# Patient Record
Sex: FEMALE | Race: WHITE | NOT HISPANIC OR LATINO | URBAN - METROPOLITAN AREA
[De-identification: names, ages, dates, MRNs, and addresses within clinical notes are randomized per-mention and may not be internally consistent; named-entity substitution may affect disease eponyms.]

---

## 2017-03-02 ENCOUNTER — EMERGENCY (EMERGENCY)
Facility: HOSPITAL | Age: 82
LOS: 1 days | Discharge: HOME | End: 2017-03-02
Attending: EMERGENCY MEDICINE | Admitting: EMERGENCY MEDICINE
Payer: MEDICARE

## 2017-03-02 VITALS
DIASTOLIC BLOOD PRESSURE: 92 MMHG | HEART RATE: 80 BPM | OXYGEN SATURATION: 98 % | RESPIRATION RATE: 18 BRPM | SYSTOLIC BLOOD PRESSURE: 168 MMHG | TEMPERATURE: 98 F

## 2017-03-02 VITALS
TEMPERATURE: 98 F | DIASTOLIC BLOOD PRESSURE: 81 MMHG | OXYGEN SATURATION: 97 % | RESPIRATION RATE: 16 BRPM | HEART RATE: 81 BPM | SYSTOLIC BLOOD PRESSURE: 141 MMHG

## 2017-03-02 DIAGNOSIS — Z79.899 OTHER LONG TERM (CURRENT) DRUG THERAPY: ICD-10-CM

## 2017-03-02 DIAGNOSIS — R55 SYNCOPE AND COLLAPSE: ICD-10-CM

## 2017-03-02 DIAGNOSIS — E86.0 DEHYDRATION: ICD-10-CM

## 2017-03-02 LAB
ALBUMIN SERPL ELPH-MCNC: 3.7 G/DL — SIGNIFICANT CHANGE UP (ref 3.4–5)
ALP SERPL-CCNC: 84 U/L — SIGNIFICANT CHANGE UP (ref 40–120)
ALT FLD-CCNC: 28 U/L — SIGNIFICANT CHANGE UP (ref 12–42)
ANION GAP SERPL CALC-SCNC: 10 MMOL/L — SIGNIFICANT CHANGE UP (ref 9–16)
APTT BLD: 27.3 SEC — LOW (ref 27.5–37.4)
AST SERPL-CCNC: 23 U/L — SIGNIFICANT CHANGE UP (ref 15–37)
BASOPHILS NFR BLD AUTO: 0.6 % — SIGNIFICANT CHANGE UP (ref 0–2)
BILIRUB SERPL-MCNC: 0.6 MG/DL — SIGNIFICANT CHANGE UP (ref 0.2–1.2)
BUN SERPL-MCNC: 44 MG/DL — HIGH (ref 7–23)
CALCIUM SERPL-MCNC: 10 MG/DL — SIGNIFICANT CHANGE UP (ref 8.5–10.5)
CHLORIDE SERPL-SCNC: 107 MMOL/L — SIGNIFICANT CHANGE UP (ref 96–108)
CK MB CFR SERPL CALC: 2.5 NG/ML — SIGNIFICANT CHANGE UP (ref 0.5–3.6)
CK SERPL-CCNC: 319 U/L — HIGH (ref 26–192)
CO2 SERPL-SCNC: 22 MMOL/L — SIGNIFICANT CHANGE UP (ref 22–31)
CREAT SERPL-MCNC: 1.36 MG/DL — HIGH (ref 0.5–1.3)
EOSINOPHIL NFR BLD AUTO: 5 % — SIGNIFICANT CHANGE UP (ref 0–6)
GLUCOSE SERPL-MCNC: 90 MG/DL — SIGNIFICANT CHANGE UP (ref 70–99)
HCT VFR BLD CALC: 32.5 % — LOW (ref 34.5–45)
HGB BLD-MCNC: 11.1 G/DL — LOW (ref 11.5–15.5)
INR BLD: 0.96 — SIGNIFICANT CHANGE UP (ref 0.88–1.16)
LYMPHOCYTES # BLD AUTO: 20.9 % — SIGNIFICANT CHANGE UP (ref 13–44)
MCHC RBC-ENTMCNC: 30.7 PG — SIGNIFICANT CHANGE UP (ref 27–34)
MCHC RBC-ENTMCNC: 34.2 G/DL — SIGNIFICANT CHANGE UP (ref 32–36)
MCV RBC AUTO: 89.8 FL — SIGNIFICANT CHANGE UP (ref 80–100)
MONOCYTES NFR BLD AUTO: 5 % — SIGNIFICANT CHANGE UP (ref 2–14)
NEUTROPHILS NFR BLD AUTO: 68.5 % — SIGNIFICANT CHANGE UP (ref 43–77)
PLATELET # BLD AUTO: 170 K/UL — SIGNIFICANT CHANGE UP (ref 150–400)
POTASSIUM SERPL-MCNC: 4.1 MMOL/L — SIGNIFICANT CHANGE UP (ref 3.5–5.3)
POTASSIUM SERPL-SCNC: 4.1 MMOL/L — SIGNIFICANT CHANGE UP (ref 3.5–5.3)
PROT SERPL-MCNC: 8.2 G/DL — SIGNIFICANT CHANGE UP (ref 6.4–8.2)
PROTHROM AB SERPL-ACNC: 10.6 SEC — SIGNIFICANT CHANGE UP (ref 10–13.1)
RBC # BLD: 3.62 M/UL — LOW (ref 3.8–5.2)
RBC # FLD: 13.1 % — SIGNIFICANT CHANGE UP (ref 10.3–16.9)
SODIUM SERPL-SCNC: 139 MMOL/L — SIGNIFICANT CHANGE UP (ref 135–145)
TROPONIN I SERPL-MCNC: <0.015 NG/ML — SIGNIFICANT CHANGE UP (ref 0.01–0.04)
WBC # BLD: 5 K/UL — SIGNIFICANT CHANGE UP (ref 3.8–10.5)
WBC # FLD AUTO: 5 K/UL — SIGNIFICANT CHANGE UP (ref 3.8–10.5)

## 2017-03-02 PROCEDURE — 71010: CPT | Mod: 26

## 2017-03-02 PROCEDURE — 71045 X-RAY EXAM CHEST 1 VIEW: CPT

## 2017-03-02 PROCEDURE — 93010 ELECTROCARDIOGRAM REPORT: CPT

## 2017-03-02 PROCEDURE — 82550 ASSAY OF CK (CPK): CPT

## 2017-03-02 PROCEDURE — 85025 COMPLETE CBC W/AUTO DIFF WBC: CPT

## 2017-03-02 PROCEDURE — 84484 ASSAY OF TROPONIN QUANT: CPT

## 2017-03-02 PROCEDURE — 93005 ELECTROCARDIOGRAM TRACING: CPT

## 2017-03-02 PROCEDURE — 85610 PROTHROMBIN TIME: CPT

## 2017-03-02 PROCEDURE — 82553 CREATINE MB FRACTION: CPT

## 2017-03-02 PROCEDURE — 36415 COLL VENOUS BLD VENIPUNCTURE: CPT

## 2017-03-02 PROCEDURE — 99284 EMERGENCY DEPT VISIT MOD MDM: CPT | Mod: 25

## 2017-03-02 PROCEDURE — 85730 THROMBOPLASTIN TIME PARTIAL: CPT

## 2017-03-02 PROCEDURE — 99285 EMERGENCY DEPT VISIT HI MDM: CPT | Mod: 25

## 2017-03-02 PROCEDURE — 80053 COMPREHEN METABOLIC PANEL: CPT

## 2017-03-02 RX ORDER — SODIUM CHLORIDE 9 MG/ML
1000 INJECTION INTRAMUSCULAR; INTRAVENOUS; SUBCUTANEOUS ONCE
Qty: 0 | Refills: 0 | Status: COMPLETED | OUTPATIENT
Start: 2017-03-02 | End: 2017-03-02

## 2017-03-02 RX ADMIN — SODIUM CHLORIDE 500 MILLILITER(S): 9 INJECTION INTRAMUSCULAR; INTRAVENOUS; SUBCUTANEOUS at 18:04

## 2017-03-02 NOTE — ED ADULT NURSE NOTE - OBJECTIVE STATEMENT
"had a stressful morning" had gone to [place] to get results of a melanoma -and right as finishing lunch , felt tired and "weary" , vision darkened and she fainted - denies CP , SOB , changes in speech, numbness , tingling

## 2017-03-02 NOTE — ED ADULT TRIAGE NOTE - CHIEF COMPLAINT QUOTE
Patient BIBA for syncopal episode while eating at the restaurant today . Had blurry vision prior to syncope . Denies any dizziness , chest pain , palpitations . Patient BIBA for syncopal episode while eating at the restaurant today . Had blurry vision prior to syncope . Denies any dizziness , chest pain , palpitations .History of melanoma cancer .

## 2017-03-02 NOTE — ED PROVIDER NOTE - OBJECTIVE STATEMENT
The pt is a 89 y/o F, BIBA w/friend, s/p syncopal episode in restaurant PTA. Pt states that she did not sleep well, was very stressed out, was having lunch (did have a cocktail), suddenly felt like vision "went black" - according to friend she turned pale and was sweaty and slumped over -- they caught her and laid her down on fl, she was out for about 20 sec then came back - completely back to baseline.  Currently only c/o feeling thirsty. Denies cp, sob, n/v/d, abd pain, pain / injuries, no bowel / bladder incontinence, no sz activity

## 2017-03-02 NOTE — ED PROVIDER NOTE - CONDUCTED A DETAILED DISCUSSION WITH PATIENT AND/OR GUARDIAN REGARDING, MDM
return to ED if symptoms worsen, persist or questions arise/lab results/need for outpatient follow-up return to ED if symptoms worsen, persist or questions arise/radiology results/lab results/need for outpatient follow-up

## 2017-03-02 NOTE — ED ADULT NURSE NOTE - CHPI ED SYMPTOMS NEG
no nausea/no back pain/no dizziness/no fever/no diaphoresis/no shortness of breath/no vomiting/no chills/no cough

## 2017-03-02 NOTE — ED ADULT NURSE NOTE - CHIEF COMPLAINT QUOTE
Patient BIBA for syncopal episode while eating at the restaurant today . Had blurry vision prior to syncope . Denies any dizziness , chest pain , palpitations .History of melanoma cancer .

## 2017-03-02 NOTE — ED PROVIDER NOTE - MEDICAL DECISION MAKING DETAILS
pt s/p syncopal episode PTA - pt admits to not sleeping well and being stressed, reports that her vision went dark - according to friend she turned pale and slumped over - was caught and laid down, pt clinically dehydrated upon arrival, ekg non ischemic, labs w/dehydration, likely vasovagal less likely acs, no suspicion for dissection given no cp, non focal neuro exam hence no suspicion for cva. pt reports symptomatic relief w/ivf, tolerated po challenge well, walked around w/o dizziness, hemodynamically stable, wanting to go home - friend at bedside to accompany, discussed w/pmd who will ensure f/u

## 2017-03-02 NOTE — ED PROVIDER NOTE - DIAGNOSTIC INTERPRETATION
Chest x-ray interpreted by ER Physician Dr. Delgadillo  Findings: heart size normal, no infiltrates, lungs fully expanded.

## 2017-03-02 NOTE — ED PROVIDER NOTE - ATTENDING CONTRIBUTION TO CARE
Pt is a 89yo F BIBA, who had witnessed syncope just PTA.  Pt reports episode preceded by dizziness and tunnel vision but denies any CP/palpitations.  No head trauma.  Now feeling well.  Pt relates sxs to being stressed out and not sleeping well.  +EtOH prior to episode.  +Thirst (improved with hydration here).  Tolerating PO.  ROS otherwise negative.    PE - agree with PA exam as above.   Labs - mild elevation of Cr, supporting dx of dehydration  EKG and CXR WNL  A/P - Likely vaso-vagal episode exacerbated by dehydration.  Hydration performed.  PCP contacted with case.  Pt would prefer d/c home.  Pt observed and reports feeling much better.  Will avoid unnecessary admission at this time given age and risk of acquiring nosocomial infections with admission.  Pt to f/u with PCP within 1-2 days.     We have discussed the discharge plan with the patient. The patient agrees with the plan, as discussed.  The patient understands Emergency Department diagnosis is a preliminary diagnosis often based on limited information and that the patient must adhere to the follow-up plan as discussed.  The patient understands that if the symptoms worsen she may return to the Emergency Department at any time for further evaluation and treatment.

## 2017-03-02 NOTE — ED PROVIDER NOTE - CARE PLAN
Principal Discharge DX:	Syncope and collapse Principal Discharge DX:	Vasovagal syncope Principal Discharge DX:	Vasovagal syncope  Secondary Diagnosis:	Dehydration

## 2018-03-16 ENCOUNTER — EMERGENCY (EMERGENCY)
Facility: HOSPITAL | Age: 83
LOS: 1 days | Discharge: HOME | End: 2018-03-16
Attending: EMERGENCY MEDICINE | Admitting: EMERGENCY MEDICINE
Payer: MEDICARE

## 2018-03-16 VITALS
OXYGEN SATURATION: 97 % | SYSTOLIC BLOOD PRESSURE: 172 MMHG | TEMPERATURE: 98 F | HEART RATE: 94 BPM | RESPIRATION RATE: 16 BRPM | DIASTOLIC BLOOD PRESSURE: 67 MMHG

## 2018-03-16 VITALS
OXYGEN SATURATION: 97 % | DIASTOLIC BLOOD PRESSURE: 78 MMHG | SYSTOLIC BLOOD PRESSURE: 138 MMHG | TEMPERATURE: 98 F | HEART RATE: 88 BPM | RESPIRATION RATE: 18 BRPM

## 2018-03-16 DIAGNOSIS — Z79.899 OTHER LONG TERM (CURRENT) DRUG THERAPY: ICD-10-CM

## 2018-03-16 DIAGNOSIS — I10 ESSENTIAL (PRIMARY) HYPERTENSION: ICD-10-CM

## 2018-03-16 DIAGNOSIS — E78.5 HYPERLIPIDEMIA, UNSPECIFIED: ICD-10-CM

## 2018-03-16 DIAGNOSIS — R55 SYNCOPE AND COLLAPSE: ICD-10-CM

## 2018-03-16 LAB
CK MB CFR SERPL CALC: 4.1 NG/ML — SIGNIFICANT CHANGE UP (ref 0–6.7)
CK SERPL-CCNC: 250 U/L — HIGH (ref 25–170)
TROPONIN T SERPL-MCNC: <0.01 NG/ML — SIGNIFICANT CHANGE UP (ref 0–0.01)

## 2018-03-16 PROCEDURE — 84484 ASSAY OF TROPONIN QUANT: CPT

## 2018-03-16 PROCEDURE — 99285 EMERGENCY DEPT VISIT HI MDM: CPT | Mod: 25

## 2018-03-16 PROCEDURE — 80053 COMPREHEN METABOLIC PANEL: CPT

## 2018-03-16 PROCEDURE — 93005 ELECTROCARDIOGRAM TRACING: CPT

## 2018-03-16 PROCEDURE — 36415 COLL VENOUS BLD VENIPUNCTURE: CPT

## 2018-03-16 PROCEDURE — 82550 ASSAY OF CK (CPK): CPT

## 2018-03-16 PROCEDURE — 71045 X-RAY EXAM CHEST 1 VIEW: CPT | Mod: 26

## 2018-03-16 PROCEDURE — 85025 COMPLETE CBC W/AUTO DIFF WBC: CPT

## 2018-03-16 PROCEDURE — 85610 PROTHROMBIN TIME: CPT

## 2018-03-16 PROCEDURE — 93010 ELECTROCARDIOGRAM REPORT: CPT

## 2018-03-16 PROCEDURE — 85730 THROMBOPLASTIN TIME PARTIAL: CPT

## 2018-03-16 PROCEDURE — 71045 X-RAY EXAM CHEST 1 VIEW: CPT

## 2018-03-16 PROCEDURE — 99283 EMERGENCY DEPT VISIT LOW MDM: CPT | Mod: 25

## 2018-03-16 PROCEDURE — 82553 CREATINE MB FRACTION: CPT

## 2018-03-16 RX ORDER — ALPRAZOLAM 0.25 MG
0.25 TABLET ORAL ONCE
Qty: 0 | Refills: 0 | Status: DISCONTINUED | OUTPATIENT
Start: 2018-03-16 | End: 2018-03-16

## 2018-03-16 RX ADMIN — Medication 30 MILLILITER(S): at 19:36

## 2018-03-16 RX ADMIN — Medication 0.25 MILLIGRAM(S): at 17:55

## 2018-03-16 NOTE — ED PROVIDER NOTE - MEDICAL DECISION MAKING DETAILS
syncopal episode while eating.  prodrome of hot/flushed/lightheaded and lack of sob/ chest pain make cardiac etiology less likely.  labs and ecg wnl.  currently asymptomatic.  possible vagal episode.  to f/u with pmd. syncopal episode while eating.  prodrome of hot/flushed/lightheaded and lack of sob/ chest pain make cardiac etiology less likely.  labs with initial mild trop elevation 0.02 but trended and repeat < 0.01.  asymptomatic in ED.  possible vagal episode.  to f/u with pmd.

## 2018-03-16 NOTE — ED ADULT NURSE NOTE - OBJECTIVE STATEMENT
PT BIBA s/p syncope this afternoon.  PT states "I was sitting down at lunch when I felt dizzy and they say I passed out."  PT denies falling, head injury, dizziness, N/V/D, SOB, Fevers and any pain at this time.  EKG obtained upon arrival to ED.  Pt A&O x3, ambulating with 1 assist,.

## 2018-03-16 NOTE — ED PROVIDER NOTE - OBJECTIVE STATEMENT
history of HTN, here with syncopal episode.  Was out to eat when she she started to feel lightheaded/ hot and flushed.  Told her friends that she thought she might pass out then did.  Per friends, was out of it for about 5 min.  Denies chest pain or trouble breathing.  No seizure like activity.  Now feels better/ asymptomatic.  History of similar episode also while eating at same restaurant a year ago and had neg workup per report

## 2018-03-16 NOTE — ED PROVIDER NOTE - PROGRESS NOTE DETAILS
dr. long maurer, case discussed with covering physician.  states had normal echo last july.  ok with dc if repeat trop stable.  will pass message to pmd for outpatient f/u

## 2018-03-16 NOTE — ED ADULT TRIAGE NOTE - CHIEF COMPLAINT QUOTE
Pt BIBA from restaurant with witnessed syncopal episode while seated, no fall or injury. Pt is now A&Ox3, no complaints. FSG with

## 2018-03-16 NOTE — ED ADULT NURSE NOTE - PMH
Essential hypertension  Hypertension  Hyperlipidemia  Hyperlipidemia  Malignant melanoma of skin  Melanoma

## 2018-03-16 NOTE — ED ADULT NURSE NOTE - PSH
History of total hip replacement  S/P hip replacement  Status post total hysterectomy  S/P hysterectomy

## 2021-08-05 PROBLEM — Z00.00 ENCOUNTER FOR PREVENTIVE HEALTH EXAMINATION: Status: ACTIVE | Noted: 2021-08-05

## 2021-08-11 ENCOUNTER — APPOINTMENT (OUTPATIENT)
Dept: VASCULAR SURGERY | Facility: CLINIC | Age: 86
End: 2021-08-11
Payer: MEDICARE

## 2021-08-11 DIAGNOSIS — Z78.9 OTHER SPECIFIED HEALTH STATUS: ICD-10-CM

## 2021-08-11 PROCEDURE — 99203 OFFICE O/P NEW LOW 30 MIN: CPT

## 2021-08-11 RX ORDER — LUTEIN 6 MG
TABLET ORAL
Refills: 0 | Status: ACTIVE | COMMUNITY

## 2021-08-11 RX ORDER — MULTIVIT-MIN/IRON/FOLIC ACID/K 18-600-40
CAPSULE ORAL
Refills: 0 | Status: ACTIVE | COMMUNITY

## 2021-08-11 RX ORDER — CHOLECALCIFEROL (VITAMIN D3) 25 MCG
TABLET ORAL
Refills: 0 | Status: ACTIVE | COMMUNITY

## 2021-08-11 RX ORDER — ATORVASTATIN CALCIUM 20 MG/1
20 TABLET, FILM COATED ORAL
Refills: 0 | Status: ACTIVE | COMMUNITY

## 2021-08-11 RX ORDER — IRBESARTAN 150 MG/1
150 TABLET ORAL
Refills: 0 | Status: ACTIVE | COMMUNITY

## 2021-08-11 RX ORDER — AMLODIPINE BESYLATE 5 MG/1
5 TABLET ORAL
Refills: 0 | Status: ACTIVE | COMMUNITY

## 2021-08-11 RX ORDER — ZOLPIDEM TARTRATE 10 MG/1
10 TABLET ORAL
Refills: 0 | Status: ACTIVE | COMMUNITY

## 2021-08-13 ENCOUNTER — APPOINTMENT (OUTPATIENT)
Dept: VASCULAR SURGERY | Facility: CLINIC | Age: 86
End: 2021-08-13
Payer: MEDICARE

## 2021-08-13 VITALS
DIASTOLIC BLOOD PRESSURE: 76 MMHG | HEART RATE: 87 BPM | SYSTOLIC BLOOD PRESSURE: 146 MMHG | BODY MASS INDEX: 23.95 KG/M2 | WEIGHT: 122 LBS | HEIGHT: 60 IN

## 2021-08-13 PROCEDURE — 99212 OFFICE O/P EST SF 10 MIN: CPT

## 2021-08-16 NOTE — HISTORY OF PRESENT ILLNESS
[FreeTextEntry1] : 89 y/o F former smoker w/ h/o HTN, L leg melanoma s/p tibial repair (2013), irritable bowel syndrome, and new LLE traumatic, slow healing wound. The wound has been present for one month after she fell from bed and hit the leg with the night stand. She was referred by Dr. Jorge Duke. She was seen 2 days ago and reports following the daily wound care instructions. She has also been elevating her leg. Denies any active drainage, foul smelling from the wound, fever or chills. \par \par Pt retired from running fashion shows. \par Pt accompanied by her personal aide.

## 2021-08-16 NOTE — PHYSICAL EXAM
[Respiratory Effort] : normal respiratory effort [Normal Rate and Rhythm] : normal rate and rhythm [2+] : left 2+ [1+] : left 1+ [Ankle Swelling (On Exam)] : not present [Varicose Veins Of Lower Extremities] : not present [] : not present [Abdomen Tenderness] : ~T ~M No abdominal tenderness [Oriented to Person] : oriented to person [Alert] : alert [Oriented to Place] : oriented to place [Oriented to Time] : oriented to time [Calm] : calm [de-identified] : Friendly and cooperative, NAD [de-identified] : NC/AT  [de-identified] : Supple  [FreeTextEntry1] : LLE: mild swelling from below the knee to the foot with significant improvement. Lateral mid calf open wound with clean base, surrounding erythema extending down with improvement from last visit. No drainage and no odor. Toes are warm to touch with adequate capillary refill.  [de-identified] : FROM [de-identified] : see cardiovasc section

## 2021-08-16 NOTE — PROCEDURE
[FreeTextEntry1] : L wound cleaned w/hydrogen peroxide and painted wound w/Betadine secured lightly w/paper tape.

## 2021-08-16 NOTE — ASSESSMENT
[FreeTextEntry1] : 89 y/o F w/ LLE slow healing traumatic wound. On exam, LLE edema and erythema with significant improvement from previous visit. Wound cleaned w/hydrogen peroxide and painted wound w/Betadine secured lightly w/paper tape. Pt encouraged to continue daily wound care, elevating the leg and to followup next Wednesday.  [Arterial/Venous Disease] : arterial/venous disease [Medication Management] : medication management [Foot care/Footwear] : foot care/footwear [Ulcer Care] : ulcer care

## 2021-08-18 NOTE — PHYSICAL EXAM
[Respiratory Effort] : normal respiratory effort [Normal Rate and Rhythm] : normal rate and rhythm [2+] : left 2+ [1+] : left 1+ [Ankle Swelling (On Exam)] : present [Ankle Swelling On The Left] : of the left ankle [Ankle Swelling On The Right] : mild [Alert] : alert [Oriented to Person] : oriented to person [Oriented to Place] : oriented to place [Oriented to Time] : oriented to time [Calm] : calm [Varicose Veins Of Lower Extremities] : not present [] : not present [Abdomen Tenderness] : ~T ~M No abdominal tenderness [de-identified] : Friendly and cooperative, NAD [de-identified] : NC/AT  [de-identified] : Supple  [FreeTextEntry1] : LLE: mild swelling from below the knee to the foot. Lateral mid calf open wound measuring 4.5 x 3.5 cm, base slightly macerated, no purulence but w/erythema extending from the foot to the mid calf c/w mild skin infection. Toes are warm to touch with adequate capillary refill.  [de-identified] : FROM [de-identified] : see cardiovasc section

## 2021-08-18 NOTE — HISTORY OF PRESENT ILLNESS
[FreeTextEntry1] : 89 y/o F former smoker w/ h/o HTN, L leg melanoma s/p tibial repair (2013), irritable bowel syndrome, who presents to the office for initial consultation of a left leg wound that has been slow to heal. She is referred by Dr. Jorge Duke. Patient reports she had a fall x 1 month ago. She explains she fell out of bed while sleeping, striking her left leg against the night table. The next morning, she noticed a new wound to the left leg. She was seen by Dr. Garrett and Dr. Duke, who both recommended she sees me. Pt is able to walk with a cane for support. She has been keeping the wound covered with a band-aid and outs Neosporin daily.\par \par Furthermore, pt mentions she has a hx of irritable bowel syndrome and is very sensitive to any antibiotics. Denies any active drainage, foul smelling from the wound, fever or chills. \par \par Pt retired from running fashion shows. \par Pt accompanied by a daughter.

## 2021-08-18 NOTE — ASSESSMENT
[Arterial/Venous Disease] : arterial/venous disease [Medication Management] : medication management [Foot care/Footwear] : foot care/footwear [Ulcer Care] : ulcer care [FreeTextEntry1] : 89 y/o F w/ LLE slow healing traumatic wound. On exam, LLE w/ easily palpable pulses throughout the leg w/ mild swelling from below the knee to the foot. Lateral mid calf open wound measuring 4.5 x 3.5 cm skin, baseslightly macerated, no purulence/drainage/odor but w/erythema extending from the foot to the mid calf c/w mild skin infection. Toes are warm to touch with adequate capillary refill. \par \par L mid lateral calf wound cleaned w/hydrogen peroxide and painted wound w/Betadine secured lightly w/paper tape. \par \par \par Plan: \par Patient instructed to complete wound care as follows: shower daily cleaning leg w/water and allowing soap to run into the wound. After showers to pad dry and apply hydrogen peroxide and paint wound w/Betadine. Wrap the left leg w/Kerlix and secure lightly with tape. Given the delicacy of her skin pt instructed to avoid applying direct tape over her skin. Elevate legs above the level of the heart for most part of the day to help reduce swelling. To f/u here in two days (Friday) for wound surveillance. Will hold off on antibiotics and see is topicals help improve the wound.

## 2021-08-18 NOTE — CONSULT LETTER
[Dear  ___] : Dear  [unfilled], [FreeTextEntry2] : Jorge Duke MD\par [address]\[place] [address] [FreeTextEntry1] : Thank you for referring Ms Camryn Boo. She sustained a traumatic wound a month ago after falling from bed and hitting the night stand and hitting the side of her left leg.  It has not been healing. She denies any drainage, odor, fever or chills. She has been covering ti with a band-aide and putting Neosporin. \par \par On exam, the left leg has a round wound with no apparent drainage or odor. The base is slightly macerated but clean. She has mild surrounding erythema extending distally and mild edema. Palpable pedal pulses.\par \par I have advised Ms Boo to clean the wound daily with peroxide and Betadine after her showers. She should cover with a rolled gauze and secure lightly with tape. I will watch her closely with regular visits to ensure the wound is moving in the right direction. I will hold off on any antibiotics given her age and irritable bowel. I have also advised her to elevate he leg as much as possible to decrease the edema. I will see her again in a couple of days.  \par \par My complete EMR office note is below for your records. If you have any questions, please do not hesitate to contact me.  [FreeTextEntry3] : Sincerely, \par \par Andres Medina M.D. \par , [facility]\par , Department of Surgery [facility]\par Professor of Surgery, [employer] at [facility]

## 2021-08-18 NOTE — ADDENDUM
[FreeTextEntry1] : This note was written by Cyndy Honeycutt on 08/11/2021 acting as scribe for Carol Vázquez M.D.\par \par  I, Dr. Andres Medina, personally performed the evaluation and management (E/M) services for this new patient.  That E/M includes conducting the initial examination, assessing all conditions, and establishing the plan of care.  Today, my ACP, Fe Colon NP, was here to observe my evaluation and management services for this patient to be followed going forward.

## 2021-08-20 ENCOUNTER — APPOINTMENT (OUTPATIENT)
Dept: VASCULAR SURGERY | Facility: CLINIC | Age: 86
End: 2021-08-20
Payer: MEDICARE

## 2021-08-20 PROCEDURE — 99212 OFFICE O/P EST SF 10 MIN: CPT

## 2021-08-20 NOTE — PHYSICAL EXAM
[Respiratory Effort] : normal respiratory effort [Normal Rate and Rhythm] : normal rate and rhythm [2+] : left 2+ [1+] : left 1+ [Ankle Swelling (On Exam)] : not present [Varicose Veins Of Lower Extremities] : not present [] : not present [Abdomen Tenderness] : ~T ~M No abdominal tenderness [Alert] : alert [Oriented to Person] : oriented to person [Oriented to Place] : oriented to place [Oriented to Time] : oriented to time [Calm] : calm [de-identified] : Friendly and cooperative, NAD [de-identified] : NC/AT  [de-identified] : Supple  [FreeTextEntry1] : LLE: mild swelling from below the knee to the foot with mild improvement. Lateral mid calf open wound with clean base, surrounding erythema extending down with improvement from last visit, smaller in size. No drainage and no odor. Toes are warm to touch with adequate capillary refill.  [de-identified] : FROM [de-identified] : see cardiovasc section

## 2021-08-20 NOTE — ASSESSMENT
[FreeTextEntry1] : 89 y/o F w/ LLE slow healing traumatic wound. On exam, LLE edema and erythema with some improvement from previous visit. Wound cleaned w/hydrogen peroxide and painted wound w/Betadine secured lightly w/paper tape. Pt encouraged to continue daily wound care, elevating the leg and explained not to use non adherent dressing directly on the wound. Wound care reviewed. ACE bandage also applied as the swelling has not improved much. Instrucitons reviewed with aide. Pt to followup next Wednesday.  [Arterial/Venous Disease] : arterial/venous disease [Medication Management] : medication management [Foot care/Footwear] : foot care/footwear [Ulcer Care] : ulcer care

## 2021-08-20 NOTE — HISTORY OF PRESENT ILLNESS
[FreeTextEntry1] : 89 y/o F former smoker w/ h/o HTN, L leg melanoma s/p tibial repair (2013), irritable bowel syndrome, and new LLE traumatic, slow healing wound. The wound has been present for one month after she fell from bed and hit the leg with the night stand. She was referred by Dr. Jorge Duke. Today, she presents for one week followup. She reports following the daily wound care instructions. She has also been elevating her leg. Denies any active drainage, foul smelling from the wound, fever or chills. \par \par Pt retired from running fashion shows. \par Pt accompanied by her personal aide.

## 2021-08-25 ENCOUNTER — APPOINTMENT (OUTPATIENT)
Dept: VASCULAR SURGERY | Facility: CLINIC | Age: 86
End: 2021-08-25
Payer: MEDICARE

## 2021-08-25 VITALS
HEART RATE: 90 BPM | BODY MASS INDEX: 23.95 KG/M2 | DIASTOLIC BLOOD PRESSURE: 69 MMHG | HEIGHT: 60 IN | SYSTOLIC BLOOD PRESSURE: 154 MMHG | WEIGHT: 122 LBS

## 2021-08-25 PROCEDURE — 99212 OFFICE O/P EST SF 10 MIN: CPT

## 2021-08-25 NOTE — ADDENDUM
[FreeTextEntry1] : This note was written by Cyndy Honeycutt on 08/25/2021 acting as scribe for STEVE. Fe Colon\par \par

## 2021-08-25 NOTE — PROCEDURE
[FreeTextEntry1] : L wound cleaned w/hydrogen peroxide and painted wound w/Betadine, wrapped with kerlix, secured lightly w/paper tape.

## 2021-08-25 NOTE — ASSESSMENT
[Arterial/Venous Disease] : arterial/venous disease [Medication Management] : medication management [Foot care/Footwear] : foot care/footwear [Ulcer Care] : ulcer care [FreeTextEntry1] : 89 y/o F w/ LLE slow healing traumatic wound. On exam, LLE edema and erythema with mild improvement from previous visit. Wound cleaned w/hydrogen peroxide and painted wound w/Betadine, wrapped w/ kerlix, secured lightly w/paper tape. Pt encouraged to continue daily wound care, elevating the leg and explained not to use non adherent dressing directly on the wound. Wound care and CE bandage application reviewed. Importance of daily use with compression bandage discussed given swelling will decrease would healing. Pt to followup in 2 weeks.

## 2021-08-25 NOTE — HISTORY OF PRESENT ILLNESS
[FreeTextEntry1] : 89 y/o F former smoker w/h/o L leg melanoma s/p tibial repair (2013), irritable bowel syndrome, and LLE traumatic, slow healing wound secondary to falling off the bed and hitting her leg with the night stand. Patient initially referred here by Dr. Jorge Whitten. She presents today for a one week follow up of the wound. She has been well in general and has been elevating her legs as much as possible. She explains stopping the ACE wraps because her foot swelled up the other day; she explains the wrapping started close to the ankle. Patient has been undergoing wound care with help provided by her aide daily. She notes the wound continues to heal slowly. Otherwise denies any reports of rest pain, skin discoloration,fever or chills. \par \par Pt retired from running fashion shows. \par Pt accompanied by her personal aide\par \par \par \par

## 2021-08-25 NOTE — PHYSICAL EXAM
[Respiratory Effort] : normal respiratory effort [Normal Rate and Rhythm] : normal rate and rhythm [2+] : left 2+ [1+] : left 1+ [Alert] : alert [Oriented to Person] : oriented to person [Oriented to Place] : oriented to place [Oriented to Time] : oriented to time [Calm] : calm [Ankle Swelling (On Exam)] : not present [Varicose Veins Of Lower Extremities] : not present [Abdomen Tenderness] : ~T ~M No abdominal tenderness [] : not present [de-identified] : Cooperative, talkative and friendly [de-identified] : NC/AT  [de-identified] : Supple  [FreeTextEntry1] : LLE: mild swelling from below the knee to the foot with slight improvement. Lateral mid calf open wound with clean base, surrounding erythema smaller and improved overall wound is smaller in size. No drainage and no odor. Toes are warm to touch with adequate capillary refill.  [de-identified] : FROM 5/5 x 4.  [de-identified] : see cardiovasc section

## 2021-09-08 ENCOUNTER — APPOINTMENT (OUTPATIENT)
Dept: VASCULAR SURGERY | Facility: CLINIC | Age: 86
End: 2021-09-08
Payer: MEDICARE

## 2021-09-08 VITALS
SYSTOLIC BLOOD PRESSURE: 124 MMHG | HEART RATE: 86 BPM | WEIGHT: 122 LBS | DIASTOLIC BLOOD PRESSURE: 66 MMHG | HEIGHT: 60 IN | BODY MASS INDEX: 23.95 KG/M2

## 2021-09-08 PROCEDURE — 99213 OFFICE O/P EST LOW 20 MIN: CPT

## 2021-09-13 NOTE — PHYSICAL EXAM
[Respiratory Effort] : normal respiratory effort [Normal Rate and Rhythm] : normal rate and rhythm [Alert] : alert [Oriented to Person] : oriented to person [Oriented to Place] : oriented to place [Oriented to Time] : oriented to time [Calm] : calm [Ankle Swelling (On Exam)] : not present [Varicose Veins Of Lower Extremities] : not present [] : not present [Abdomen Tenderness] : ~T ~M No abdominal tenderness [de-identified] : Cooperative, talkative and friendly [de-identified] : NC/AT  [de-identified] : Supple  [FreeTextEntry1] : LLE: mild swelling from below the knee to the foot with slight improvement. Lateral mid calf open wound with clean base, improved overall wound is smaller in size with a dry callus formation around wound. No drainage and no odor. Toes are warm to touch with adequate capillary refill.  [de-identified] : FROM 5/5 x 4. Ambulates with a cane. [de-identified] : see cardiovasc section

## 2021-09-13 NOTE — PROCEDURE
[FreeTextEntry1] : LLE cleaned w/hydrogen peroxide and painted wound w/Betadine, small callus formation debrided and wrapped w/kerlix and ACE bandage.

## 2021-09-13 NOTE — ASSESSMENT
[Arterial/Venous Disease] : arterial/venous disease [Medication Management] : medication management [Foot care/Footwear] : foot care/footwear [Ulcer Care] : ulcer care [FreeTextEntry1] : 89 y/o F w/ LLE slow healing traumatic wound. On exam, LLE edema and wound size with mild improvement from previous visit. Wound cleaned w/hydrogen peroxide and painted wound w/Betadine, wrapped w/ kerlix, secured lightly w/paper tape. Debrided surrounding callus formation. \par \par Pt encouraged to continue daily wound care, elevating the leg as much as possible, and explained not to use non adherent dressing directly on the wound. Wound care and CE bandage application reviewed. Importance of daily use with compression bandage discussed given swelling will decrease would healing. Pt to followup in 2 weeks.

## 2021-09-13 NOTE — ADDENDUM
[FreeTextEntry1] : This note was written by Cyndy Honeycutt on 09/08/2021 acting as scribe for Carol Vázquez M.D.\par \par  I, Dr. Andres Medina, personally performed the evaluation and management (E/M) services for this established patient who presents today with (a) new problem(s)/exacerbation of (an) existing condition(s).  That E/M includes conducting the examination, assessing all new/exacerbated conditions, and establishing a new plan of care.  Today, my ACP, Fe Colon NP, was here to observe my evaluation and management services for this new problem/exacerbated condition to be followed going forward.

## 2021-09-13 NOTE — HISTORY OF PRESENT ILLNESS
[FreeTextEntry1] : 89 y/o F former smoker w/h/o L leg melanoma s/p tibial repair (2013), irritable bowel syndrome, and LLE traumatic, slow healing wound secondary to falling off the bed and hitting her leg with the night stand. Patient initially referred here by Dr. Jorge Whitten. She presents today for a two-week follow up of the wound. She has been well in general and has been elevating her legs as much as possible. Patient has been undergoing wound care with help provided by her aide daily. She notes the wound continues to heal slowly. She expresses the wound it the same but the aide points out that it is smaller in size. Pt still reports it is hard for her to elevate the leg too much as she likes to stay active and that it is still a little swollen. Otherwise, denies any reports of rest pain, skin discoloration, fever or chills. \par \par Pt retired from running fashion shows. \par Pt accompanied by her personal aide\par \par \par \par

## 2021-09-16 NOTE — ED PROVIDER NOTE - PSYCHIATRIC, MLM
Subjective   Hollie Garvey is a 44 y.o. female.   You have chosen to receive care through a telehealth visit.  Do you consent to use a video/audio connection for your medical care today? Yes  History of Present Illness   Patient presents to office to f/u on her respiratory infection . She was seen at  on 9/7.  She had negative chest xray but rhonchi and rales were noted.  She was prescribed doxycycline, prednisone, albuterol and benzonatate.  She states symptoms have improved but she still has some chest congestion and now has some soreness in her anterior chest wall from the coughing.  She has had costochondritis before after respiratory infection and feels this is the same.    The following portions of the patient's history were reviewed and updated as appropriate: allergies, current medications, past family history, past medical history, past social history, past surgical history and problem list.    Review of Systems   Constitutional: Positive for fatigue. Negative for chills, fever and unexpected weight change.   Respiratory: Positive for cough and chest tightness. Negative for shortness of breath.    Cardiovascular: Positive for chest pain. Negative for palpitations.   Psychiatric/Behavioral: Negative for behavioral problems.       Objective   Physical Exam  Vitals and nursing note reviewed.   Constitutional:       Appearance: She is well-developed.   Pulmonary:      Effort: Pulmonary effort is normal.   Neurological:      Mental Status: She is alert and oriented to person, place, and time.   Psychiatric:         Mood and Affect: Mood normal.         Behavior: Behavior normal.         Thought Content: Thought content normal.         Judgment: Judgment normal.         Assessment/Plan   Diagnoses and all orders for this visit:    1. Bronchitis (Primary)  -     methylPREDNISolone (MEDROL) 4 MG dose pack; Take as directed on package instructions.  Dispense: 21 tablet; Refill: 0    2. Costochondritis  -      methylPREDNISolone (MEDROL) 4 MG dose pack; Take as directed on package instructions.  Dispense: 21 tablet; Refill: 0      F/u after finishing medrol taper.           This visit has been rescheduled as a video visit to comply with patient safety concerns in accordance with CDC recommendations. Total time of encounter was 11 minutes.     Alert and oriented to person, place, time/situation. normal mood and affect. no apparent risk to self or others.

## 2021-09-22 ENCOUNTER — APPOINTMENT (OUTPATIENT)
Dept: VASCULAR SURGERY | Facility: CLINIC | Age: 86
End: 2021-09-22
Payer: MEDICARE

## 2021-09-22 PROCEDURE — 99212 OFFICE O/P EST SF 10 MIN: CPT

## 2021-09-23 NOTE — HISTORY OF PRESENT ILLNESS
[FreeTextEntry1] : 89 y/o F former smoker w/h/o L leg melanoma s/p tibial repair (2013), irritable bowel syndrome, and LLE traumatic, slow healing wound secondary to falling off the bed and hitting her leg with the night stand. Patient initially referred here by Dr. Jorge Whitten. She presents today for a two-week follow up of the wound. She has been well in general and has been elevating her legs as much as possible. Patient has been undergoing wound care with help provided by her aide daily. She notes the wound continues to heal slowly. She has noticed the wound getting smaller. Pt still reports it is hard for her to elevate the leg too much as she likes to stay active and that it is still a little swollen. She experienced some itchiness from the dressing. Otherwise, denies any reports of rest pain, skin discoloration, new wounds, fever or chills. \par \par Pt retired from running fashion shows. \par Pt accompanied by her personal aide\par \par \par \par

## 2021-09-23 NOTE — ASSESSMENT
[FreeTextEntry1] : 89 y/o F w/ LLE slow healing traumatic wound. On exam, LLE edema and wound size with mild improvement from previous visit. Wound cleaned w/hydrogen peroxide and small scab formation slightly debrided, moisturized with vitamin A & D and wrapped w/kerlix and secured w/ paper tape.\par \par Pt encouraged to continue daily wound care, elevating the leg as much as possible, and explained not to use non adherent dressing directly on the wound. Reassured her that her wound is progressing and will soon heal. Pt to followup in 2-3 weeks. [Arterial/Venous Disease] : arterial/venous disease [Ulcer Care] : ulcer care

## 2021-09-23 NOTE — ADDENDUM
[FreeTextEntry1] : This note was written by Cyndy Honeycutt on 09/22/2021 acting as scribe for NP Fe Colon\par \par I, Fe Cloon NP, personally performed the evaluation and management (E/M) services for this established patient who presents today with (a) chronic problem(s) of (an) existing condition(s).  That E/M includes conducting the examination, assessing all conditions, and establishing a plan of care.

## 2021-09-23 NOTE — PROCEDURE
[FreeTextEntry1] : LLE cleaned w/hydrogen peroxide and small scab formation slightly debrided, moisturized skin with vitamin A & D and wrapped w/kerlix and secured w/ paper tape.

## 2021-09-23 NOTE — PHYSICAL EXAM
[Respiratory Effort] : normal respiratory effort [Normal Rate and Rhythm] : normal rate and rhythm [Alert] : alert [Oriented to Person] : oriented to person [Oriented to Place] : oriented to place [Oriented to Time] : oriented to time [Calm] : calm [Ankle Swelling (On Exam)] : not present [Varicose Veins Of Lower Extremities] : not present [] : not present [Abdomen Tenderness] : ~T ~M No abdominal tenderness [de-identified] : Cooperative, talkative and friendly [de-identified] : NC/AT  [de-identified] : Supple  [FreeTextEntry1] : LLE: mild swelling from below the knee to the foot with slight improvement. Lateral mid calf wound with clean base, improved. Overall wound is smaller in size with a dry scab formation over wound. Signs of scratching distally to the wound. No drainage and no odor. Toes are warm to touch with adequate capillary refill.  [de-identified] : FROM 5/5 x 4. Ambulates with a cane. [de-identified] : see cardiovasc section

## 2021-10-06 ENCOUNTER — APPOINTMENT (OUTPATIENT)
Dept: VASCULAR SURGERY | Facility: CLINIC | Age: 86
End: 2021-10-06
Payer: MEDICARE

## 2021-10-06 VITALS
HEIGHT: 60 IN | SYSTOLIC BLOOD PRESSURE: 151 MMHG | BODY MASS INDEX: 23.95 KG/M2 | WEIGHT: 122 LBS | HEART RATE: 71 BPM | DIASTOLIC BLOOD PRESSURE: 73 MMHG

## 2021-10-06 DIAGNOSIS — L98.499 NON-PRESSURE CHRONIC ULCER OF SKIN OF OTHER SITES WITH UNSPECIFIED SEVERITY: ICD-10-CM

## 2021-10-06 PROCEDURE — 99213 OFFICE O/P EST LOW 20 MIN: CPT

## 2021-10-13 NOTE — HISTORY OF PRESENT ILLNESS
[FreeTextEntry1] : 89 y/o F former smoker w/h/o L leg melanoma s/p tibial repair (2013), irritable bowel syndrome, and LLE traumatic, slow healing wound secondary to falling off the bed and hitting her leg with the night stand. Patient initially referred here by Dr. Jorge Whitten. She presents today for a two-week follow up. She has been well in general and has been elevating her legs as much as possible. She reports the wound has healed. Denies any reports of rest pain, skin discoloration, new wounds, fever or chills. \par \par Pt retired from running fashion shows. \par Pt accompanied by her personal aide

## 2021-10-13 NOTE — ADDENDUM
[FreeTextEntry1] : This note was written by Nu Bazan on 10/06/2021 acting as scribe for Caorl Vázquez M.D.\par \par  I, Dr. Andres Medina, personally performed the evaluation and management (E/M) services for this established patient who presents today with (a) chronic problem(s) of (an) existing condition(s).  That E/M includes conducting the examination, assessing all conditions, and establishing a plan of care. Today, my ACP, Fe Colon NP, was here to observe my evaluation and management services for this condition(s) to be followed going forward.

## 2021-10-13 NOTE — ASSESSMENT
[Arterial/Venous Disease] : arterial/venous disease [FreeTextEntry1] : 89 y/o F w/ LLE slow healing traumatic wound. On exam, LLE wound healed nicely with hyperpigmentation in the area where the scab was. No edema. Toes are warm to touch with adequate capillary refill. Pt encouraged to keep her skin moisturized to avoid skin breakdown and to stay active. Pt to followup PRN.

## 2021-10-13 NOTE — PHYSICAL EXAM
[Respiratory Effort] : normal respiratory effort [Normal Rate and Rhythm] : normal rate and rhythm [Alert] : alert [Oriented to Person] : oriented to person [Oriented to Place] : oriented to place [Oriented to Time] : oriented to time [Calm] : calm [Ankle Swelling (On Exam)] : not present [Varicose Veins Of Lower Extremities] : not present [] : not present [Abdomen Tenderness] : ~T ~M No abdominal tenderness [de-identified] : Cooperative, talkative and friendly [de-identified] : NC/AT  [de-identified] : Supple  [FreeTextEntry1] : LLE: Lateral mid calf wound healed nicely with hyperpigmentation in the area where the scab was. No edema. Toes are warm to touch with adequate capillary refill.  [de-identified] : FROM 5/5 x 4. Ambulates with a cane. [de-identified] : see cardiovasc section

## 2021-12-10 ENCOUNTER — INPATIENT (INPATIENT)
Facility: HOSPITAL | Age: 86
LOS: 0 days | Discharge: HOME | DRG: 83 | End: 2021-12-11
Attending: INTERNAL MEDICINE | Admitting: INTERNAL MEDICINE
Payer: COMMERCIAL

## 2021-12-10 VITALS
HEART RATE: 102 BPM | RESPIRATION RATE: 18 BRPM | TEMPERATURE: 98 F | HEIGHT: 64 IN | SYSTOLIC BLOOD PRESSURE: 179 MMHG | WEIGHT: 119.93 LBS | DIASTOLIC BLOOD PRESSURE: 90 MMHG

## 2021-12-10 DIAGNOSIS — Z98.41 CATARACT EXTRACTION STATUS, RIGHT EYE: Chronic | ICD-10-CM

## 2021-12-10 DIAGNOSIS — N18.4 CHRONIC KIDNEY DISEASE, STAGE 4 (SEVERE): ICD-10-CM

## 2021-12-10 DIAGNOSIS — S06.5X9A TRAUMATIC SUBDURAL HEMORRHAGE WITH LOSS OF CONSCIOUSNESS OF UNSPECIFIED DURATION, INITIAL ENCOUNTER: ICD-10-CM

## 2021-12-10 DIAGNOSIS — I10 ESSENTIAL (PRIMARY) HYPERTENSION: ICD-10-CM
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
## 2021-12-10 LAB
ALBUMIN SERPL ELPH-MCNC: 4.3 G/DL — SIGNIFICANT CHANGE UP (ref 3.3–5)
ALP SERPL-CCNC: 71 U/L — SIGNIFICANT CHANGE UP (ref 40–120)
ALT FLD-CCNC: 27 U/L — SIGNIFICANT CHANGE UP (ref 10–45)
ANION GAP SERPL CALC-SCNC: 11 MMOL/L — SIGNIFICANT CHANGE UP (ref 5–17)
APPEARANCE UR: CLEAR — SIGNIFICANT CHANGE UP
APTT BLD: 27.7 SEC — SIGNIFICANT CHANGE UP (ref 27.5–35.5)
AST SERPL-CCNC: 44 U/L — HIGH (ref 10–40)
BASOPHILS # BLD AUTO: 0.04 K/UL — SIGNIFICANT CHANGE UP (ref 0–0.2)
BASOPHILS NFR BLD AUTO: 0.4 % — SIGNIFICANT CHANGE UP (ref 0–2)
BILIRUB SERPL-MCNC: 0.4 MG/DL — SIGNIFICANT CHANGE UP (ref 0.2–1.2)
BILIRUB UR-MCNC: NEGATIVE — SIGNIFICANT CHANGE UP
BUN SERPL-MCNC: 61 MG/DL — HIGH (ref 7–23)
CALCIUM SERPL-MCNC: 11.6 MG/DL — HIGH (ref 8.4–10.5)
CHLORIDE SERPL-SCNC: 98 MMOL/L — SIGNIFICANT CHANGE UP (ref 96–108)
CK MB CFR SERPL CALC: 17.8 NG/ML — HIGH (ref 0–6.7)
CK MB CFR SERPL CALC: 28.4 NG/ML — HIGH (ref 0–6.7)
CK MB CFR SERPL CALC: 29.9 NG/ML — HIGH (ref 0–6.7)
CK SERPL-CCNC: 1101 U/L — HIGH (ref 25–170)
CK SERPL-CCNC: 952 U/L — HIGH (ref 25–170)
CK SERPL-CCNC: 990 U/L — HIGH (ref 25–170)
CO2 SERPL-SCNC: 25 MMOL/L — SIGNIFICANT CHANGE UP (ref 22–31)
COLOR SPEC: YELLOW — SIGNIFICANT CHANGE UP
CREAT SERPL-MCNC: 1.86 MG/DL — HIGH (ref 0.5–1.3)
DIFF PNL FLD: ABNORMAL
EOSINOPHIL # BLD AUTO: 0.05 K/UL — SIGNIFICANT CHANGE UP (ref 0–0.5)
EOSINOPHIL NFR BLD AUTO: 0.6 % — SIGNIFICANT CHANGE UP (ref 0–6)
GLUCOSE SERPL-MCNC: 127 MG/DL — HIGH (ref 70–99)
GLUCOSE UR QL: NEGATIVE — SIGNIFICANT CHANGE UP
HCT VFR BLD CALC: 34.9 % — SIGNIFICANT CHANGE UP (ref 34.5–45)
HGB BLD-MCNC: 11.3 G/DL — LOW (ref 11.5–15.5)
IMM GRANULOCYTES NFR BLD AUTO: 0.4 % — SIGNIFICANT CHANGE UP (ref 0–1.5)
INR BLD: 0.92 — SIGNIFICANT CHANGE UP (ref 0.88–1.16)
KETONES UR-MCNC: NEGATIVE — SIGNIFICANT CHANGE UP
LEUKOCYTE ESTERASE UR-ACNC: NEGATIVE — SIGNIFICANT CHANGE UP
LYMPHOCYTES # BLD AUTO: 0.57 K/UL — LOW (ref 1–3.3)
LYMPHOCYTES # BLD AUTO: 6.4 % — LOW (ref 13–44)
MAGNESIUM SERPL-MCNC: 2 MG/DL — SIGNIFICANT CHANGE UP (ref 1.6–2.6)
MCHC RBC-ENTMCNC: 30.4 PG — SIGNIFICANT CHANGE UP (ref 27–34)
MCHC RBC-ENTMCNC: 32.4 GM/DL — SIGNIFICANT CHANGE UP (ref 32–36)
MCV RBC AUTO: 93.8 FL — SIGNIFICANT CHANGE UP (ref 80–100)
MONOCYTES # BLD AUTO: 0.38 K/UL — SIGNIFICANT CHANGE UP (ref 0–0.9)
MONOCYTES NFR BLD AUTO: 4.3 % — SIGNIFICANT CHANGE UP (ref 2–14)
NEUTROPHILS # BLD AUTO: 7.82 K/UL — HIGH (ref 1.8–7.4)
NEUTROPHILS NFR BLD AUTO: 87.9 % — HIGH (ref 43–77)
NITRITE UR-MCNC: NEGATIVE — SIGNIFICANT CHANGE UP
NRBC # BLD: 0 /100 WBCS — SIGNIFICANT CHANGE UP (ref 0–0)
NT-PROBNP SERPL-SCNC: 3738 PG/ML — HIGH (ref 0–300)
PH UR: 6 — SIGNIFICANT CHANGE UP (ref 5–8)
PLATELET # BLD AUTO: 176 K/UL — SIGNIFICANT CHANGE UP (ref 150–400)
POTASSIUM SERPL-MCNC: 3.7 MMOL/L — SIGNIFICANT CHANGE UP (ref 3.5–5.3)
POTASSIUM SERPL-SCNC: 3.7 MMOL/L — SIGNIFICANT CHANGE UP (ref 3.5–5.3)
PROT SERPL-MCNC: 8 G/DL — SIGNIFICANT CHANGE UP (ref 6–8.3)
PROT UR-MCNC: 100 MG/DL
PROTHROM AB SERPL-ACNC: 11.1 SEC — SIGNIFICANT CHANGE UP (ref 10.6–13.6)
RBC # BLD: 3.72 M/UL — LOW (ref 3.8–5.2)
RBC # FLD: 13.5 % — SIGNIFICANT CHANGE UP (ref 10.3–14.5)
SARS-COV-2 RNA SPEC QL NAA+PROBE: NEGATIVE — SIGNIFICANT CHANGE UP
SODIUM SERPL-SCNC: 134 MMOL/L — LOW (ref 135–145)
SP GR SPEC: 1.02 — SIGNIFICANT CHANGE UP (ref 1–1.03)
TROPONIN T SERPL-MCNC: 0.08 NG/ML — CRITICAL HIGH (ref 0–0.01)
TROPONIN T SERPL-MCNC: 0.08 NG/ML — CRITICAL HIGH (ref 0–0.01)
TROPONIN T SERPL-MCNC: 0.1 NG/ML — CRITICAL HIGH (ref 0–0.01)
UROBILINOGEN FLD QL: 0.2 E.U./DL — SIGNIFICANT CHANGE UP
WBC # BLD: 8.9 K/UL — SIGNIFICANT CHANGE UP (ref 3.8–10.5)
WBC # FLD AUTO: 8.9 K/UL — SIGNIFICANT CHANGE UP (ref 3.8–10.5)

## 2021-12-10 PROCEDURE — 99282 EMERGENCY DEPT VISIT SF MDM: CPT

## 2021-12-10 PROCEDURE — 70450 CT HEAD/BRAIN W/O DYE: CPT | Mod: 26,MA

## 2021-12-10 PROCEDURE — 99285 EMERGENCY DEPT VISIT HI MDM: CPT | Mod: 25

## 2021-12-10 PROCEDURE — 70450 CT HEAD/BRAIN W/O DYE: CPT | Mod: 26,77

## 2021-12-10 PROCEDURE — 93010 ELECTROCARDIOGRAM REPORT: CPT

## 2021-12-10 PROCEDURE — 71045 X-RAY EXAM CHEST 1 VIEW: CPT | Mod: 26

## 2021-12-10 PROCEDURE — 99223 1ST HOSP IP/OBS HIGH 75: CPT

## 2021-12-10 RX ORDER — LIPASE/PROTEASE/AMYLASE 16-48-48K
1 CAPSULE,DELAYED RELEASE (ENTERIC COATED) ORAL
Qty: 0 | Refills: 0 | DISCHARGE

## 2021-12-10 RX ORDER — AMLODIPINE BESYLATE 2.5 MG/1
5 TABLET ORAL DAILY
Refills: 0 | Status: DISCONTINUED | OUTPATIENT
Start: 2021-12-10 | End: 2021-12-11

## 2021-12-10 RX ORDER — AMLODIPINE BESYLATE 2.5 MG/1
0 TABLET ORAL
Qty: 0 | Refills: 0 | DISCHARGE

## 2021-12-10 RX ORDER — FAMOTIDINE 10 MG/ML
20 INJECTION INTRAVENOUS DAILY
Refills: 0 | Status: DISCONTINUED | OUTPATIENT
Start: 2021-12-10 | End: 2021-12-11

## 2021-12-10 RX ORDER — LOSARTAN POTASSIUM 100 MG/1
0 TABLET, FILM COATED ORAL
Qty: 0 | Refills: 0 | DISCHARGE

## 2021-12-10 RX ORDER — TETANUS TOXOID, REDUCED DIPHTHERIA TOXOID AND ACELLULAR PERTUSSIS VACCINE, ADSORBED 5; 2.5; 8; 8; 2.5 [IU]/.5ML; [IU]/.5ML; UG/.5ML; UG/.5ML; UG/.5ML
0.5 SUSPENSION INTRAMUSCULAR ONCE
Refills: 0 | Status: COMPLETED | OUTPATIENT
Start: 2021-12-10 | End: 2021-12-10

## 2021-12-10 RX ORDER — ZALEPLON 10 MG
10 CAPSULE ORAL AT BEDTIME
Refills: 0 | Status: DISCONTINUED | OUTPATIENT
Start: 2021-12-10 | End: 2021-12-11

## 2021-12-10 RX ORDER — BACITRACIN ZINC 500 UNIT/G
1 OINTMENT IN PACKET (EA) TOPICAL ONCE
Refills: 0 | Status: COMPLETED | OUTPATIENT
Start: 2021-12-10 | End: 2021-12-10

## 2021-12-10 RX ORDER — MESALAMINE 400 MG
1 TABLET, DELAYED RELEASE (ENTERIC COATED) ORAL
Qty: 0 | Refills: 0 | DISCHARGE

## 2021-12-10 RX ORDER — MESALAMINE 400 MG
400 TABLET, DELAYED RELEASE (ENTERIC COATED) ORAL THREE TIMES A DAY
Refills: 0 | Status: DISCONTINUED | OUTPATIENT
Start: 2021-12-10 | End: 2021-12-11

## 2021-12-10 RX ORDER — SIMVASTATIN 20 MG/1
20 TABLET, FILM COATED ORAL AT BEDTIME
Refills: 0 | Status: DISCONTINUED | OUTPATIENT
Start: 2021-12-10 | End: 2021-12-11

## 2021-12-10 RX ORDER — LIPASE/PROTEASE/AMYLASE 16-48-48K
2 CAPSULE,DELAYED RELEASE (ENTERIC COATED) ORAL
Refills: 0 | Status: DISCONTINUED | OUTPATIENT
Start: 2021-12-10 | End: 2021-12-11

## 2021-12-10 RX ORDER — ACETAMINOPHEN 500 MG
650 TABLET ORAL EVERY 6 HOURS
Refills: 0 | Status: DISCONTINUED | OUTPATIENT
Start: 2021-12-10 | End: 2021-12-11

## 2021-12-10 RX ADMIN — SIMVASTATIN 20 MILLIGRAM(S): 20 TABLET, FILM COATED ORAL at 22:07

## 2021-12-10 RX ADMIN — Medication 650 MILLIGRAM(S): at 21:45

## 2021-12-10 RX ADMIN — Medication 650 MILLIGRAM(S): at 20:42

## 2021-12-10 RX ADMIN — Medication 1 APPLICATION(S): at 10:08

## 2021-12-10 RX ADMIN — Medication 400 MILLIGRAM(S): at 22:07

## 2021-12-10 RX ADMIN — TETANUS TOXOID, REDUCED DIPHTHERIA TOXOID AND ACELLULAR PERTUSSIS VACCINE, ADSORBED 0.5 MILLILITER(S): 5; 2.5; 8; 8; 2.5 SUSPENSION INTRAMUSCULAR at 11:26

## 2021-12-10 NOTE — H&P ADULT - PROBLEM SELECTOR PLAN 4
-Baseline Cr unknown.    -BUN/Cr 61/1.86. BUN 30s /Cr 1.3-1.5 in 3894-8715.  -AVOID NEPHROTOXIC AGENTS . Hold Irbesartan as patient with possible LENCHO based on lab values  -Daily Phosphorous   -Monitor urine output, BP, electrolytes and volume status.      FULL CODE  DVT Prophylaxis: SCDs given Subdural hematoma  Dispo: Pending clinical progression

## 2021-12-10 NOTE — H&P ADULT - ASSESSMENT
89 year old F ambulates with a cane former smoker with PMHx HTN, Hyperlipidemia, GERD, Malignant Melanoma Left Lower Leg s/p Excision (followed at [facility]), COPD, Pancreatic Insufficiency, CKD Stage IV (baseline Cr unknown, Cr 1.3-1.5 in 2019/2020), Degenerative Joint Disease, Syncope (patient reports 3-4 episodes of fainting in her life etiology unknown), who presented to [facility] ED 12/10/2021 s/p fall found to have elevated Troponin, EKG changes, and small subdural hematoma on Head CT. Patient now admitted for further management.  89 year old F ambulates with a cane former smoker with PMHx HTN, Hyperlipidemia, GERD, Malignant Melanoma Left Lower Leg s/p Excision [place] [facility] COPD, Pancreatic Insufficiency, Colitis, CKD Stage IV (baseline Cr unknown, Cr 1.3-1.5 in 2019/2020), Degenerative Joint Disease, Syncope (patient reports 3-4 episodes of fainting in her life etiology unknown), who presented to [facility] ED 12/10/2021 s/p fall found to have elevated Troponin, EKG changes, and small subdural hematoma on Head CT. Patient now admitted for further management.

## 2021-12-10 NOTE — H&P ADULT - PROBLEM SELECTOR PLAN 3
Continue Amlodipine 5 mg daily; can increase to 10 mg if needed for BP control  -Hold Irbesartan due to possible LENCHO

## 2021-12-10 NOTE — ED ADULT NURSE NOTE - OBJECTIVE STATEMENT
Patient presents to ED c/o slip and fall in the bathroom this morning. Patient denies head in jury or LOC. Patient presents with b/l arm abrasions and bruising, stating "I'm always bruised because I take Aspirin but the cuts on my arms are from me trying to get myself out of the bath tub." Patient denies any pain, weakness, or dizziness at this time. PMH HLD, Melanoma, and HTN. NKA. Patient is fully vaccinated against COVID.

## 2021-12-10 NOTE — ED PROVIDER NOTE - PHYSICAL EXAMINATION
GEN: Elderly, well-developed, awake, alert, oriented to person, place, time and in no apparent distress. NTAF  ENT: Airway patent, Nasal mucosa clear. Mouth with normal mucosa.  EYES: Clear bilaterally. PERRL, EOMI  RESPIRATORY: Breathing comfortably with normal RR. No W/C/R, no hypoxia or resp distress.  CARDIAC: Regular rate and rhythm, no M/R/G  ABDOMEN: Soft, nontender, +bowel sounds, no rebound, rigidity, or guarding.  MSK: Range of motion is not limited, no deformities noted. No midline c/t/l-spine TTP. Distal ext NVI with +2 pulses, compartments soft, no ligamentous instability.   NEURO: Alert and oriented x 3. Cn 2-12 intact. Strength 5/5 and sensation intact in all 4 extremities.    SKIN: Skin normal color for race, warm, dry, tiny abrasion to bridge of nose, ecchymosis and to bilateral forearms,  abrasion/skin tears to bilateral elbows, no active bleeding.   PSYCH: Alert and oriented to person, place, time/situation. normal mood and affect. no apparent risk to self or others.

## 2021-12-10 NOTE — H&P ADULT - NSICDXPASTSURGICALHX_GEN_ALL_CORE_FT
PAST SURGICAL HISTORY:  History of total hip replacement S/P hip replacement    S/P bilateral cataract extraction     Status post total hysterectomy S/P hysterectomy

## 2021-12-10 NOTE — H&P ADULT - ATTENDING COMMENTS
Initial attending contact date 12/10/21 in [facility]. See attending addendum to HPI here for initial attending contact documentation.   89F PMH HTN, HLD, GERD, COPD, CKD IV pancreatic insufficiency, melanoma, on ASA 81 presents s/p mechanical fall. CTH shows small subacute R frontal subdural hematoma. Reports increased frequency of falls recently. ED concerned for syncopal episodes. Patient reports that she remembers each fall and does not have prodrome or LOC. She states them as trips or slips and falls. In remote past >3-4yrs ago patient reports “fainting episodes” and prior providers note patient has history of syncope and frequent falls.   Plan for:  Repeat CT Brain in 4 hours from initial  No anti platelets or anticoagulation pending serial brain imaging  Send infectious w/u to r/o as cause for falls  Obtain orthostatic vitals  TTE for cardiac structural evaluation  EP consultation for eval of ?senile conduction disease, possible LTCM  Neurosurgery to continue to follow, awaiting serial imaging  Physical therapy assessment  Andrea Lutz M.D.  Cardiology Attending  75minutes spent on total encounter; more than 50% of the visit was spent counseling and/or coordinating care by the attending physician, with plan of care discussed with the patient and cardiac team.

## 2021-12-10 NOTE — H&P ADULT - NSICDXPASTMEDICALHX_GEN_ALL_CORE_FT
PAST MEDICAL HISTORY:  Essential hypertension Hypertension    Hyperlipidemia Hyperlipidemia    Malignant melanoma of skin Melanoma

## 2021-12-10 NOTE — PATIENT PROFILE ADULT - FUNCTIONAL ASSESSMENT - DAILY ACTIVITY SCORE.

## 2021-12-10 NOTE — CONSULT NOTE ADULT - SUBJECTIVE AND OBJECTIVE BOX
HISTORY OF PRESENT ILLNESS:   89 F PMH HTN, HLD, GERD, COPD, pancreatic insufficiency, melanoma, on ASA 81 presents s/p mechanical fall in her bathroom at home around 4am. CTH shows small subacute R frontal subdural hematoma. Reports increased frequency of falls recently. Denies HA, vision changes, N/V, weakness, numbness.      PAST MEDICAL & SURGICAL HISTORY:  Hyperlipidemia  Hyperlipidemia    Essential hypertension  Hypertension    Malignant melanoma of skin  Melanoma    Status post total hysterectomy  S/P hysterectomy    History of total hip replacement  S/P hip replacement      FAMILY HISTORY:      Allergies    No Known Allergies    Intolerances        REVIEW OF SYSTEMS    MEDICATIONS:  Antibiotics:    Neuro:    Anticoagulation:    OTHER:    IVF:      Vital Signs Last 24 Hrs  T(C): 36.4 (10 Dec 2021 09:18), Max: 36.4 (10 Dec 2021 09:18)  T(F): 97.5 (10 Dec 2021 09:18), Max: 97.5 (10 Dec 2021 09:18)  HR: 94 (10 Dec 2021 13:56) (94 - 102)  BP: 179/80 (10 Dec 2021 13:56) (179/80 - 179/90)  BP(mean): --  RR: 18 (10 Dec 2021 13:56) (18 - 18)  SpO2: 98% (10 Dec 2021 13:56) (98% - 100%)    PHYSICAL EXAM:  Awake, alert, no distress  AAOX3. Verbal function intact  Cranial Nerves: II-XII intact  Motor: 5/5 power in b/l UE and LE  Sensation: intact to touch in all extremities  Pronator Drift: none  Dysmetria: none            LABS:                        11.3   8.90  )-----------( 176      ( 10 Dec 2021 10:10 )             34.9     12-10    134<L>  |  98  |  61<H>  ----------------------------<  127<H>  3.7   |  25  |  1.86<H>    Ca    11.6<H>      10 Dec 2021 10:10  Mg     2.0     12-10    TPro  8.0  /  Alb  4.3  /  TBili  0.4  /  DBili  x   /  AST  44<H>  /  ALT  27  /  AlkPhos  71  12-10    PT/INR - ( 10 Dec 2021 10:10 )   PT: 11.1 sec;   INR: 0.92          PTT - ( 10 Dec 2021 10:10 )  PTT:27.7 sec  Urinalysis Basic - ( 10 Dec 2021 10:58 )    Color: Yellow / Appearance: Clear / S.020 / pH: x  Gluc: x / Ketone: NEGATIVE  / Bili: Negative / Urobili: 0.2 E.U./dL   Blood: x / Protein: 100 mg/dL / Nitrite: NEGATIVE   Leuk Esterase: NEGATIVE / RBC: < 5 /HPF / WBC < 5 /HPF   Sq Epi: x / Non Sq Epi: 0-5 /HPF / Bacteria: Present /HPF      CULTURES:      RADIOLOGY & ADDITIONAL STUDIES:    Assessment: 89 F PMH HTN, HLD, GERD, COPD, pancreatic insufficiency, melanoma, on ASA 81 presenting s/p fall with tiny subacute R SDH, neuro intact      Recommendations:  - Please repeat CTH non-con in 4 hours to assess stability  - Aspirin, and other antiplatelet/anticoagulation may be started at discretion of primary team, after weighing risk vs benefit.     Discussed with Dr. D'Amico   HISTORY OF PRESENT ILLNESS:   89 F PMH HTN, HLD, GERD, COPD, pancreatic insufficiency, melanoma, on ASA 81 presents s/p mechanical fall in her bathroom at home around 4am. CTH shows small subacute R frontal subdural hematoma. Reports increased frequency of falls recently. Denies HA, vision changes, N/V, weakness, numbness.      PAST MEDICAL & SURGICAL HISTORY:  Hyperlipidemia  Hyperlipidemia    Essential hypertension  Hypertension    Malignant melanoma of skin  Melanoma    Status post total hysterectomy  S/P hysterectomy    History of total hip replacement  S/P hip replacement      FAMILY HISTORY:      Allergies    No Known Allergies    Intolerances        REVIEW OF SYSTEMS    MEDICATIONS:  Antibiotics:    Neuro:    Anticoagulation:    OTHER:    IVF:      Vital Signs Last 24 Hrs  T(C): 36.4 (10 Dec 2021 09:18), Max: 36.4 (10 Dec 2021 09:18)  T(F): 97.5 (10 Dec 2021 09:18), Max: 97.5 (10 Dec 2021 09:18)  HR: 94 (10 Dec 2021 13:56) (94 - 102)  BP: 179/80 (10 Dec 2021 13:56) (179/80 - 179/90)  BP(mean): --  RR: 18 (10 Dec 2021 13:56) (18 - 18)  SpO2: 98% (10 Dec 2021 13:56) (98% - 100%)    PHYSICAL EXAM:  Awake, alert, no distress  AAOX3. Verbal function intact  Cranial Nerves: II-XII intact  Motor: 5/5 power in b/l UE and LE  Sensation: intact to touch in all extremities  Pronator Drift: none  Dysmetria: none            LABS:                        11.3   8.90  )-----------( 176      ( 10 Dec 2021 10:10 )             34.9     12-10    134<L>  |  98  |  61<H>  ----------------------------<  127<H>  3.7   |  25  |  1.86<H>    Ca    11.6<H>      10 Dec 2021 10:10  Mg     2.0     12-10    TPro  8.0  /  Alb  4.3  /  TBili  0.4  /  DBili  x   /  AST  44<H>  /  ALT  27  /  AlkPhos  71  12-10    PT/INR - ( 10 Dec 2021 10:10 )   PT: 11.1 sec;   INR: 0.92          PTT - ( 10 Dec 2021 10:10 )  PTT:27.7 sec  Urinalysis Basic - ( 10 Dec 2021 10:58 )    Color: Yellow / Appearance: Clear / S.020 / pH: x  Gluc: x / Ketone: NEGATIVE  / Bili: Negative / Urobili: 0.2 E.U./dL   Blood: x / Protein: 100 mg/dL / Nitrite: NEGATIVE   Leuk Esterase: NEGATIVE / RBC: < 5 /HPF / WBC < 5 /HPF   Sq Epi: x / Non Sq Epi: 0-5 /HPF / Bacteria: Present /HPF      CULTURES:      RADIOLOGY & ADDITIONAL STUDIES:    Assessment: 89 F PMH HTN, HLD, GERD, COPD, pancreatic insufficiency, melanoma, on ASA 81 presenting s/p fall with tiny subacute R SDH, neuro intact      Recommendations:  - Please repeat CTH non-con in 4 hours to assess stability  - Aspirin, and other antiplatelet/anticoagulation are relative contraindications, but may be started at discretion of primary team, after weighing risk vs benefit.     Discussed with Dr. D'Amico

## 2021-12-10 NOTE — ED PROVIDER NOTE - PROGRESS NOTE DETAILS
Pt found to have small subacute subdural hematoma on CT. Seen by NS who recommends repeat CT head 4 hours after first CT. Will admit to cards for further mgmt of elevated trop 0.08 and 0.08 on repeat, elevated trop and frequent recent falls/syncope, r/o acs, r/o arrythmia, r/o CHF.

## 2021-12-10 NOTE — ED PROVIDER NOTE - OBJECTIVE STATEMENT
89F with a  h/o HTN, HLD, on baby aspirin who p/w fall. Pt states she was walking to her bathroom at 4am this morning when she slipped on the marble floor falling forward into her bathtub and scraping bilateral forearms and elbows. When her aid came she called 911. Pt does not thin she hit her head but cannot recall and has a tiny abrasion to her nose. She denies feeling dizzy, LH, CP or SOB prior to falling. She currently denies cp/sob, n/v, dizziness, n/t/w in ext, HA, neck pain. Pt's PMD Dr. Garrett ([phone]) called and states patient has been having more frequent falls lately. 89F with a  h/o HTN, HLD, GERD, COPD, pancreatic insufficiency, malignant melanoma (followed at [facility]) on baby aspirin who p/w fall. Pt states she was walking to her bathroom at 4am this morning when she slipped on the marble floor falling forward into her bathtub and scraping bilateral forearms and elbows. When her aid came she called 911. Pt does not thin she hit her head but cannot recall and has a tiny abrasion to her nose. She denies feeling dizzy, LH, CP or SOB prior to falling. She currently denies cp/sob, n/v, dizziness, n/t/w in ext, HA, neck pain. Pt's PMD Dr. Garrett ([phone]) called and states patient has been having more frequent falls lately.

## 2021-12-10 NOTE — PATIENT PROFILE ADULT - FALL HARM RISK - HARM RISK INTERVENTIONS

## 2021-12-10 NOTE — ED ADULT TRIAGE NOTE - CHIEF COMPLAINT QUOTE
89 y/o female BIBMES for evaluation of abrasions to bilateral arms s/p fall today in the bathroom. Pt is oax4. noted abrasions on both arms. Pt denies loc, denies anticoagulants.

## 2021-12-10 NOTE — ED ADULT NURSE NOTE - CHIEF COMPLAINT QUOTE
89 y/o female BIBMES for evaluation of abrasions to bilateral arms s/p fall today in the bathroom. Pt is oax4. noted abrasions on both arms. Pt denies loc, denies anticoagulants.

## 2021-12-10 NOTE — H&P ADULT - PROBLEM SELECTOR PLAN 2
-Neurosurgery consulted. Repeat Head CT 4 hrs post original scan ordered. Follow-up results     -Hold ASA for now -Neurosurgery consulted. Repeat Head CT 4 hrs post original scan ordered. Follow-up results     -Hold ASA for now  -No neuro deficits on exam  -Neuro checks q 4 hrs

## 2021-12-10 NOTE — H&P ADULT - NSHPSOCIALHISTORY_GEN_ALL_CORE
TOB: Quit ~ 20 years. 1 ppd  ETOH  Illicit Drug Use TOB: Quit ~ 20 years. 1 ppd  Occasional ETOH  Denies Illicit Drug Use

## 2021-12-10 NOTE — ED ADULT NURSE NOTE - NSIMPLEMENTINTERV_GEN_ALL_ED
Implemented All Fall with Harm Risk Interventions:  [place] to call system. Call bell, personal items and telephone within reach. Instruct patient to call for assistance. Room bathroom lighting operational. Non-slip footwear when patient is off stretcher. Physically safe environment: no spills, clutter or unnecessary equipment. Stretcher in lowest position, wheels locked, appropriate side rails in place. Provide visual cue, wrist band, yellow gown, etc. Monitor gait and stability. Monitor for mental status changes and reorient to person, place, and time. Review medications for side effects contributing to fall risk. Reinforce activity limits and safety measures with patient and family. Provide visual clues: red socks.

## 2021-12-10 NOTE — H&P ADULT - PROBLEM SELECTOR PLAN 1
History of falls and Syncope (patient reports 3-4 episodes of fainting in her life etiology unknown),   s/p fall this AM.   -Continue telemetry to rule out arrhythmias.    -Echocardiogram ordered. Follow-up results   -Troponin 0.08 x 2 + CK and CKMB likely secondary to fall. Patient denies C/P. New TWI inferolateral compared to EKG 2019.   -Head CT + Small subdural hematoma   -R/O infection: U/A negative for nitrites or leukocyte esterase, +bacteria. Left shift without leukocytosis. Patient afebrile. CXR negative for infiltrates.   -Orthostatics   -Patient ambulates with cane and has frequent falls. PT ordered

## 2021-12-10 NOTE — ED PROVIDER NOTE - CLINICAL SUMMARY MEDICAL DECISION MAKING FREE TEXT BOX
89F with a  h/o HTN, HLD, on baby aspirin who p/w fall. Pt states she was walking to her bathroom at 4am this morning when she slipped on the marble floor falling forward into her bathtub and scraping bilateral forearms and elbows. When her aid came she called 911. Pt does not thin she hit her head but cannot recall and has a tiny abrasion to her nose. She denies feeling dizzy, LH, CP or SOB prior to falling. She currently denies cp/sob, n/v, dizziness, n/t/w in ext, HA, neck pain. Pt's PMD Dr. Garrett ([phone]) called and states patient has been having more frequent falls lately.    Plan for labs, CT, CXR, 89F with a  h/o HTN, HLD, on baby aspirin who p/w fall. Pt states she was walking to her bathroom at 4am this morning when she slipped on the marble floor falling forward into her bathtub and scraping bilateral forearms and elbows. When her aid came she called 911. Pt does not thin she hit her head but cannot recall and has a tiny abrasion to her nose. She denies feeling dizzy, LH, CP or SOB prior to falling. She currently denies cp/sob, n/v, dizziness, n/t/w in ext, HA, neck pain. Pt's PMD Dr. Garrett ([phone]) called and states patient has been having more frequent falls/?syncope lately.    Plan for labs, CT, CXR,, r/o ICH, r/o acs, r/o electrolyte abnormality

## 2021-12-10 NOTE — H&P ADULT - HISTORY OF PRESENT ILLNESS
89 year old F ambulates with a cane former smoker with PMHx HTN, Hyperlipidemia, GERD, Malignant Melanoma Left Lower Leg s/p Excision (followed at [facility] [place]), COPD, Pancreatic Insufficiency, CKD Stage IV (baseline Cr unknown, Cr 1.3-1.5 in 2019/2020), Degenerative Joint Disease, Syncope (patient reports 3-4 episodes of fainting in her life etiology unknown),  who presented to [facility] ED 12/10/2021 c/o fall this AM. Patient reports she slipped and fell forward in the bathroom into her bathtub and sustained lacerations and bruising to both arms at 4 AM. Upon arrival of her aide 911 was called. Patient is unable to recall if she hit her head but she does not think so. She denies LOC, C/P, SOB, palpitations, dizziness, diaphoresis, N/V,  fever, chills, cough, diarrhea, abdominal pain, dysuria, changes in medications, decrease in PO intake. Per Dr. Garrett/Dr. Duke patient has history of syncope/recurrent falls 6 episodes in 3 months most recently 3 weeks ago resulting in closed head injury and no imaging was done.      On arrival to [facility] ER VSS aside from HTN /90  RR 18 Temp 97.5 F O2 sat 100 % RA. Troponin T elevated at 0.08 x 2, , CKMB 28.4 and EKG showed NSR with no ST changes, TWI III, AVF, V6 (new compared to EKG 2019 sent by Dr. Garrett). BNP elevated 3738 and CXR  revealed right basilar focal atelectasis. Labs significant for Hgb/HCT 11.3/34.9, Neutrophils 87.9%, Na 134, BUN/CR 61/1.86, Calcium 11.6. Covid PCR negative. U/A +Protein, Blood, bacteria, negative for nitrites and leukocyte esterase.      Head CT Thin isodense subacute right frontal convexity subdural hematoma measuring approximately approximately 4 mm in greatest width. Neurosurgery consulted and recommended repeat Head CT in 4 hours.  Patient is now admitted for further evaluation of frequent falls, R/O syncope, R/O ACS.      [facility] unable to be reached. Meds obtained from [employer] (Meds from other sources) and Dr. Duke's note 89 year old F ambulates with a cane former smoker with PMHx HTN, Hyperlipidemia, GERD, Malignant Melanoma Left Lower Leg s/p Excision (followed at [facility] [place] [place]), COPD, Pancreatic Insufficiency, Colitis, CKD Stage IV (baseline Cr unknown, Cr 1.3-1.5 in 2019/2020), Degenerative Joint Disease, Syncope (patient reports 3-4 episodes of fainting in her life etiology unknown),  who presented to [facility] ED 12/10/2021 c/o fall this AM. Patient reports she slipped and fell forward in the bathroom into her bathtub and sustained lacerations and bruising to both arms at 4 AM. Upon arrival of her aide 911 was called. Patient is unable to recall if she hit her head but she does not think so. She denies LOC, C/P, SOB, palpitations, dizziness, diaphoresis, N/V,  fever, chills, cough, diarrhea, abdominal pain, dysuria, changes in medications, decrease in PO intake. Per Dr. Garrett/Dr. Duke patient has history of syncope/recurrent falls 6 episodes in 3 months most recently 3 weeks ago resulting in closed head injury and no imaging was done.      On arrival to [facility] ER VSS aside from HTN /90  RR 18 Temp 97.5 F O2 sat 100 % RA. Troponin T elevated at 0.08 x 2, , CKMB 28.4 and EKG showed NSR with no ST changes, TWI III, AVF, V6 (new compared to EKG 2019 sent by Dr. Garrett). BNP elevated 3738 and CXR  revealed right basilar focal atelectasis. Labs significant for Hgb/HCT 11.3/34.9, Neutrophils 87.9%, Na 134, BUN/CR 61/1.86, Calcium 11.6. Covid PCR negative. U/A +Protein, Blood, bacteria, negative for nitrites and leukocyte esterase.      Head CT Thin isodense subacute right frontal convexity subdural hematoma measuring approximately approximately 4 mm in greatest width. Neurosurgery consulted and recommended repeat Head CT in 4 hours.  Patient is now admitted for further evaluation of frequent falls, R/O syncope, R/O ACS.      [facility] unable to be reached. Meds obtained from [employer] (Meds from other sources) and Dr. Duke's note

## 2021-12-10 NOTE — H&P ADULT - NSHPLABSRESULTS_GEN_ALL_CORE
11.3   8.90  )-----------( 176      ( 10 Dec 2021 10:10 )             34.9       12-10    134<L>  |  98  |  61<H>  ----------------------------<  127<H>  3.7   |  25  |  1.86<H>    Ca    11.6<H>      10 Dec 2021 10:10  Mg     2.0     12-10    TPro  8.0  /  Alb  4.3  /  TBili  0.4  /  DBili  x   /  AST  44<H>  /  ALT  27  /  AlkPhos  71  12-10      PT/INR - ( 10 Dec 2021 10:10 )   PT: 11.1 sec;   INR: 0.92          PTT - ( 10 Dec 2021 10:10 )  PTT:27.7 sec    CARDIAC MARKERS ( 10 Dec 2021 13:51 )  x     / 0.08 ng/mL / 1101 U/L / x     / 29.9 ng/mL  CARDIAC MARKERS ( 10 Dec 2021 10:10 )  x     / 0.08 ng/mL / 990 U/L / x     / 28.4 ng/mL        Urinalysis Basic - ( 10 Dec 2021 10:58 )    Color: Yellow / Appearance: Clear / S.020 / pH: x  Gluc: x / Ketone: NEGATIVE  / Bili: Negative / Urobili: 0.2 E.U./dL   Blood: x / Protein: 100 mg/dL / Nitrite: NEGATIVE   Leuk Esterase: NEGATIVE / RBC: < 5 /HPF / WBC < 5 /HPF   Sq Epi: x / Non Sq Epi: 0-5 /HPF / Bacteria: Present /HPF        EKG:

## 2021-12-11 ENCOUNTER — TRANSCRIPTION ENCOUNTER (OUTPATIENT)
Age: 86
End: 2021-12-11

## 2021-12-11 VITALS — TEMPERATURE: 98 F

## 2021-12-11 LAB
ANION GAP SERPL CALC-SCNC: 9 MMOL/L — SIGNIFICANT CHANGE UP (ref 5–17)
BUN SERPL-MCNC: 48 MG/DL — HIGH (ref 7–23)
CALCIUM SERPL-MCNC: 10.8 MG/DL — HIGH (ref 8.4–10.5)
CHLORIDE SERPL-SCNC: 104 MMOL/L — SIGNIFICANT CHANGE UP (ref 96–108)
CK MB CFR SERPL CALC: 12.2 NG/ML — HIGH (ref 0–6.7)
CK SERPL-CCNC: 811 U/L — HIGH (ref 25–170)
CO2 SERPL-SCNC: 24 MMOL/L — SIGNIFICANT CHANGE UP (ref 22–31)
CREAT SERPL-MCNC: 1.71 MG/DL — HIGH (ref 0.5–1.3)
GLUCOSE SERPL-MCNC: 97 MG/DL — SIGNIFICANT CHANGE UP (ref 70–99)
HCT VFR BLD CALC: 33.4 % — LOW (ref 34.5–45)
HGB BLD-MCNC: 10.8 G/DL — LOW (ref 11.5–15.5)
MAGNESIUM SERPL-MCNC: 2 MG/DL — SIGNIFICANT CHANGE UP (ref 1.6–2.6)
MCHC RBC-ENTMCNC: 30.3 PG — SIGNIFICANT CHANGE UP (ref 27–34)
MCHC RBC-ENTMCNC: 32.3 GM/DL — SIGNIFICANT CHANGE UP (ref 32–36)
MCV RBC AUTO: 93.8 FL — SIGNIFICANT CHANGE UP (ref 80–100)
NRBC # BLD: 0 /100 WBCS — SIGNIFICANT CHANGE UP (ref 0–0)
PHOSPHATE SERPL-MCNC: 3.4 MG/DL — SIGNIFICANT CHANGE UP (ref 2.5–4.5)
PLATELET # BLD AUTO: 176 K/UL — SIGNIFICANT CHANGE UP (ref 150–400)
POTASSIUM SERPL-MCNC: 4.1 MMOL/L — SIGNIFICANT CHANGE UP (ref 3.5–5.3)
POTASSIUM SERPL-SCNC: 4.1 MMOL/L — SIGNIFICANT CHANGE UP (ref 3.5–5.3)
RBC # BLD: 3.56 M/UL — LOW (ref 3.8–5.2)
RBC # FLD: 13.7 % — SIGNIFICANT CHANGE UP (ref 10.3–14.5)
SODIUM SERPL-SCNC: 137 MMOL/L — SIGNIFICANT CHANGE UP (ref 135–145)
T4 FREE SERPL-MCNC: 1.16 NG/DL — SIGNIFICANT CHANGE UP (ref 0.93–1.7)
TROPONIN T SERPL-MCNC: 0.1 NG/ML — CRITICAL HIGH (ref 0–0.01)
TSH SERPL-MCNC: 2.11 UIU/ML — SIGNIFICANT CHANGE UP (ref 0.27–4.2)
WBC # BLD: 5.74 K/UL — SIGNIFICANT CHANGE UP (ref 3.8–10.5)
WBC # FLD AUTO: 5.74 K/UL — SIGNIFICANT CHANGE UP (ref 3.8–10.5)

## 2021-12-11 PROCEDURE — 99239 HOSP IP/OBS DSCHRG MGMT >30: CPT

## 2021-12-11 PROCEDURE — 93306 TTE W/DOPPLER COMPLETE: CPT | Mod: 26

## 2021-12-11 RX ORDER — ASPIRIN/CALCIUM CARB/MAGNESIUM 324 MG
1 TABLET ORAL
Qty: 0 | Refills: 0 | DISCHARGE

## 2021-12-11 RX ADMIN — Medication 10 MILLIGRAM(S): at 00:04

## 2021-12-11 RX ADMIN — Medication 400 MILLIGRAM(S): at 15:09

## 2021-12-11 RX ADMIN — Medication 650 MILLIGRAM(S): at 06:12

## 2021-12-11 RX ADMIN — AMLODIPINE BESYLATE 5 MILLIGRAM(S): 2.5 TABLET ORAL at 06:08

## 2021-12-11 RX ADMIN — Medication 650 MILLIGRAM(S): at 07:23

## 2021-12-11 RX ADMIN — Medication 7.5 MILLIGRAM(S): at 11:47

## 2021-12-11 RX ADMIN — Medication 400 MILLIGRAM(S): at 06:09

## 2021-12-11 RX ADMIN — Medication 2 CAPSULE(S): at 11:47

## 2021-12-11 RX ADMIN — FAMOTIDINE 20 MILLIGRAM(S): 10 INJECTION INTRAVENOUS at 11:47

## 2021-12-11 NOTE — DISCHARGE NOTE NURSING/CASE MANAGEMENT/SOCIAL WORK - NSDCFUADDAPPT_GEN_ALL_CORE_FT
Please call [phone] to schedule an appointment to follow-up with your cardiologist Dr Jorge Duke and see him within 1 week from your discharge in the hospital.

## 2021-12-11 NOTE — DISCHARGE NOTE NURSING/CASE MANAGEMENT/SOCIAL WORK - NSDCPEFALRISK_GEN_ALL_CORE
For information on Fall & Injury Prevention, visit: [email or web address] OR  [email or web address] OR  https://www.cdc.gov/steadi/patient.html

## 2021-12-11 NOTE — PROGRESS NOTE ADULT - ATTENDING COMMENTS
Initial attending contact date  12/11/21    . See resident note written above for details. I reviewed the resident documentation. I have personally seen and examined this patient. I reviewed vitals, labs, medications, cardiac studies, and additional imaging. I agree with the above residnet's findings and plans as written above with the following additions/statements.    89F PMH HTN, HLD, GERD, COPD, CKD IV pancreatic insufficiency, melanoma, on ASA 81 presents s/p mechanical fall. CTH shows small subacute R frontal subdural hematoma.   -No events on tele  -Repeat CT head - unchanged size - small frontal subdural hematoma  -Awaiting PT eval   -SW to eval for dc planning

## 2021-12-11 NOTE — DISCHARGE NOTE NURSING/CASE MANAGEMENT/SOCIAL WORK - NSDCVIVACCINE_GEN_ALL_CORE_FT
Tdap; 10-Dec-2021 11:26; Ubaldo Meredith (MORENA); [employer]; [id number] (Exp. Date: 09-Sep-2023); IntraMuscular; Deltoid Left.; 0.5 milliLiter(s); VIS (VIS Published: 09-May-2013, VIS Presented: 10-Dec-2021);

## 2021-12-11 NOTE — DISCHARGE NOTE PROVIDER - DISCHARGE DIET
Postoperative Note    Postoperative visit number 1 following right revision CWD mastoidectomy.   Pain: YES: but much improving     Otorrhea: NO  Hearing Improvement:no    Physical Exam:  Incision: clean, dry, well approximated, appropriate, steri strips  Pinna:   EAC/Meatus: stent in place, had migrated inward. No purulence or foul odor. began to pull out but she vasovagal prior to all of it pulled out.   Tympanic Membrane: CNE  Middle ear: CNE    Facial Nerve Function:  normal, full    Other:  Impression:  Doing well s/p CWD mastoid  Had vasovagal during packing pull. She wants the packing to remain in place if able. No s/s of infection. Will keep in until Monday then pull    
Regular Diet - No restrictions

## 2021-12-11 NOTE — PROGRESS NOTE ADULT - PROBLEM SELECTOR PLAN 3
- Continue Amlodipine 5 mg daily; can increase to 10 mg if needed for BP control  - Hold Irbesartan due to possible LENCHO

## 2021-12-11 NOTE — PROGRESS NOTE ADULT - SUBJECTIVE AND OBJECTIVE BOX
OVERNIGHT EVENTS: NAOE    SUBJECTIVE / INTERVAL HPI: Patient seen and examined at bedside. Pt reports she drinks whiskey 1-2 shots per night, but not binge drinking or heavy alcoholic (per patient). Pt denies any HA, visions changes. pt denies any CP, palpitations or SOB. Pt reports pain at the sites of her peripheral IVs.    MEDICATIONS  (STANDING):  amLODIPine   Tablet 5 milliGRAM(s) Oral daily  busPIRone 7.5 milliGRAM(s) Oral <User Schedule>  famotidine    Tablet 20 milliGRAM(s) Oral daily  mesalamine DR Capsule 400 milliGRAM(s) Oral three times a day  pancrelipase  (CREON 12,000 Lipase Units) 2 Capsule(s) Oral with lunch  simvastatin 20 milliGRAM(s) Oral at bedtime    MEDICATIONS  (PRN):  acetaminophen     Tablet .. 650 milliGRAM(s) Oral every 6 hours PRN Moderate Pain (4 - 6)  zaleplon 10 milliGRAM(s) Oral at bedtime PRN Insomnia    Allergies    No Known Allergies    Intolerances        VITAL SIGNS:  Vital Signs Last 24 Hrs  T(C): 36.4 (11 Dec 2021 05:57), Max: 36.9 (10 Dec 2021 17:59)  T(F): 97.6 (11 Dec 2021 05:57), Max: 98.4 (10 Dec 2021 17:59)  HR: 81 (11 Dec 2021 08:02) (72 - 94)  BP: 158/70 (11 Dec 2021 08:02) (128/58 - 179/80)  BP(mean): 101 (11 Dec 2021 08:02) (84 - 113)  RR: 20 (11 Dec 2021 08:02) (18 - 23)  SpO2: 94% (11 Dec 2021 08:02) (94% - 98%)      12-10-21 @ 07:01  -  21 @ 07:00  --------------------------------------------------------  IN: 200 mL / OUT: 250 mL / NET: -50 mL        PHYSICAL EXAM:  General: NAD, Laying comfortably in bed  HEENT: NC/AT, anicteric sclera, MMM  Neck: supple  Cardiovascular: +S1/S2, RRR, No murmurs, rubs, gallops  Respiratory: CTA B/L, no W/R/R  Gastrointestinal: soft, NT/ND, +BSx4  Extremities: WWP, no edema, clubbing or cyanosis  Vascular: 2+ radial, DP/PT pulses B/L  Neurological: AAOx3, no focal deficits  Skin: + Abrasions right arm (3cm circular) and left arm (6x2cm) near elbows      LABS:                        11.3   8.90  )-----------( 176      ( 10 Dec 2021 10:10 )             34.9     12-    137  |  104  |  48<H>  ----------------------------<  97  4.1   |  24  |  1.71<H>    Ca    10.8<H>      11 Dec 2021 08:34  Phos  3.4     12-11  Mg     2.0     12-11    TPro  8.0  /  Alb  4.3  /  TBili  0.4  /  DBili  x   /  AST  44<H>  /  ALT  27  /  AlkPhos  71  12-10    PT/INR - ( 10 Dec 2021 10:10 )   PT: 11.1 sec;   INR: 0.92          PTT - ( 10 Dec 2021 10:10 )  PTT:27.7 sec  Urinalysis Basic - ( 10 Dec 2021 10:58 )    Color: Yellow / Appearance: Clear / S.020 / pH: x  Gluc: x / Ketone: NEGATIVE  / Bili: Negative / Urobili: 0.2 E.U./dL   Blood: x / Protein: 100 mg/dL / Nitrite: NEGATIVE   Leuk Esterase: NEGATIVE / RBC: < 5 /HPF / WBC < 5 /HPF   Sq Epi: x / Non Sq Epi: 0-5 /HPF / Bacteria: Present /HPF      CAPILLARY BLOOD GLUCOSE            Urinalysis with Rflx Culture (collected 12-10-21 @ 10:58)        RADIOLOGY & ADDITIONAL TESTS: Reviewed.

## 2021-12-11 NOTE — DISCHARGE NOTE PROVIDER - NSDCMRMEDTOKEN_GEN_ALL_CORE_FT
amLODIPine 5 mg oral tablet: 1 tab(s) orally once a day  busPIRone 7.5 mg oral tablet: 1 tab(s) orally once a day  calcium (as carbonate) 500 mg oral tablet: 1 tab(s) orally 2 times a day  Creon 24,000 units oral delayed release capsule: 1 cap(s) orally once a day  famotidine 20 mg oral tablet: 1 tab(s) orally once a day  irbesartan 150 mg oral tablet: 1 tab(s) orally once a day  mesalamine 1.2 g oral delayed release tablet: 1 tab(s) orally once a day  simvastatin 20 mg oral tablet: 1 tab(s) orally once a day (at bedtime)  Vitamin D3 25 mcg (1000 intl units) oral tablet: 1 tab(s) orally once a day  zolpidem 10 mg oral tablet: 1 tab(s) orally once a day (at bedtime)

## 2021-12-11 NOTE — PROGRESS NOTE ADULT - ASSESSMENT
89F PMH HTN, HLD, GERD, COPD, CKD IV, pancreatic insufficiency, melanoma, on ASA 81 presents s/p mechanical fall. CTH shows small subacute R frontal subdural hematoma, s/p repeat CTH showing stable hematoma, pending NSurg recs

## 2021-12-11 NOTE — DISCHARGE NOTE PROVIDER - CARE PROVIDER_API CALL
Jorge Duke  CARDIOVASCULAR DISEASE  [address] [address]  Phone: [phone]  Fax: [phone]  Follow Up Time: 1 week

## 2021-12-11 NOTE — PHYSICAL THERAPY INITIAL EVALUATION ADULT - PERTINENT HX OF CURRENT PROBLEM, REHAB EVAL
89 year old F ambulates with a cane former smoker with PMHx HTN, Hyperlipidemia, GERD, Malignant Melanoma Left Lower Leg s/p Excision (followed at [facility] [place]), COPD, Pancreatic Insufficiency, Colitis, CKD Stage IV (baseline Cr unknown, Cr 1.3-1.5 in 2019/2020), Degenerative Joint Disease, Syncope (patient reports 3-4 episodes of fainting in her life etiology unknown),  who presented to [facility] ED 12/10/2021 c/o fall this AM. Patient reports she slipped and fell forward in the bathroom

## 2021-12-11 NOTE — CHART NOTE - NSCHARTNOTEFT_GEN_A_CORE
Friend of patient Pamela Dodd says that the patient has had 6 falls so far this year. The health attendant isn't able to come until tomorrow morning at the earliest. Will hold off discharge until home attendant can be at the patient's home to accept patient for safe discharge planning. Friend of patient Pamela Dodd says that the patient has had 6 falls so far this year. The health attendant isn't able to come until tomorrow morning at the earliest. The patient however wants to go home. Pt states that she understands the risks of falls. She says that she could fall at anytime whether it is today vs tomorrow on discharge. She has a home health aid but shes not 24hours so it could happen at any time. She accepts that raisk. She wants to eat her own food and be in her home. The bed here is uncomfortable and the pillows make her itchy. She prefers to leave. She has full capacity, a&Ox4, understands risks of going home and still wants to leave. Will organize self-pay ambulette home. Pt wants to get an ambullette and accepts that insurance will not cover it.

## 2021-12-11 NOTE — DISCHARGE NOTE PROVIDER - NSDCCPCAREPLAN_GEN_ALL_CORE_FT
PRINCIPAL DISCHARGE DIAGNOSIS  Diagnosis: Frequent falls  Assessment and Plan of Treatment: You have had a fall yesterday. It appears that the cause is “mechanical”. That means that you slipped, tripped or lost your balance. If your fall had been due to fainting or a seizure, further tests would be required. Health conditions which change your blood pressure, vision, muscle strength and coordination may increase your risk for falls. Medication can also increase your risk if they make you dizzy, weak, or sleepy. To prevent falls, you can stand or sit up slowly, use assistive devices as directed, pwear shoes that fit well and have soles that , wear a personal alarm, stay active, and manage your medical conditions. Home safety tips include keeping your path clear, removing small rugs, not walking on wet surfaces, installing bright lights at home, and keeping items you use often on shelves within reach.  You had a CT Head done and it showed a subdural hematoma, a brain bleed. We did a repeat CT head and that bleed did not increase in size. We stopped ASPIRIN and you should not be taking aspirin.         SECONDARY DISCHARGE DIAGNOSES  Diagnosis: CKD (chronic kidney disease) stage 4, GFR 15-29 ml/min  Assessment and Plan of Treatment: Chronic kidney disease is the gradual and permanent loss of kidney function. Normally, the kidneys remove fluids, chemicals, and waste from your blood and turn these waste chemicals into urine. As your kidney disease worse, you lose your ability to remove waste from your body. Call 911 or seek care immediately if your heart is beating faster than normal for you, are confused or very drowsy, have a seizure, have sudden chest pain or shortness of breath. Please follow regularly with your PCP to be properly managed.      Diagnosis: HTN (hypertension)  Assessment and Plan of Treatment: Hypertension is the medical term for high blood pressure. Blood pressure refers to the pressure that blood applies to the inner walls of the arteries. Arteries carry blood from the heart to other organs and parts of the body. Untreated high blood pressure increases the strain on the heart and arteries, eventually causing organ damage. High blood pressure increases the risk of heart failure, heart attack (myocardial infarction), stroke, and kidney failure. High blood pressure does not usually cause any symptoms. Treatment of hypertension usually begins with lifestyle changes. Making these lifestyle changes involves little or no risk. Recommended changes often include reducing the amount of salt in your diet, losing weight if you are overweight or obese, avoiding drinking too much alcohol, stopping smoking and exercising at least 30 minutes per day most days of the week. If you are prescribed medication for your hypertension it is important to take these as prescribed to prevent the possible complications of uncontrolled hypertension.  Please follow up with Dr Duke within 1 week from       Diagnosis: Abrasion  Assessment and Plan of Treatment:

## 2021-12-11 NOTE — DISCHARGE NOTE PROVIDER - HOSPITAL COURSE
#Discharge: do not delete  89F PMH HTN, HLD, GERD, COPD, CKD IV, pancreatic insufficiency, melanoma, on ASA 81 presents s/p mechanical fall. CTH shows small subacute R frontal subdural hematoma, s/p repeat CTH showing stable hematoma    Problem List/Main Diagnoses (system-based):   #Fall   History of falls and Syncope (patient reports 3-4 episodes of fainting in her life etiology unknown),   s/p fall   Continue telemetry to rule out arrhythmias.    Patient ambulates with cane and has frequent falls  CTH on arrival showed small subdural hematoma; repeat Head CT-Small right frontal convexity subdural hematoma, not significantly changed since prior exam performed earlier on 12/10/2021. No mass effect.  trops stable, CKMD, CK trending down  Holding ASA  orthostatics negative  PT recommended home  TTE showed __.     #Subdural hematoma  Neurosurgery consulted. Repeat CTH unchanged.   No neuro deficits on exam    #HTN (hypertension)  Pt continued on Amlodipine 5 mg daily  held Irbesartan due to possible LENCHO.      Inpatient treatment course:     New medications: None    Medications stopped: ASA    Labs to be followed outpatient: None    Exam to be followed outpatient: None

## 2021-12-11 NOTE — DISCHARGE NOTE NURSING/CASE MANAGEMENT/SOCIAL WORK - PATIENT PORTAL LINK FT
You can access the FollowMyHealth Patient Portal offered by [facility] by registering at the following website: [email or web address]. By joining [employer]’s FollowMyHealth portal, you will also be able to view your health information using other applications (apps) compatible with our system.

## 2021-12-11 NOTE — PROGRESS NOTE ADULT - PROBLEM SELECTOR PLAN 1
History of falls and Syncope (patient reports 3-4 episodes of fainting in her life etiology unknown),   s/p fall   Continue telemetry to rule out arrhythmias.    Patient ambulates with cane and has frequent falls  CTH on arrival showed small subdural hematoma; repeat Head CT-Small right frontal convexity subdural hematoma, not significantly changed since prior exam performed earlier on 12/10/2021. No mass effect.  trops stable, CKMD, CK trending down    - f/u NSurg recs  - Holding ASA, pending NSurg recs.  - f/u TTE  - f/u PT recs  - f/u orthostatics

## 2021-12-11 NOTE — PROGRESS NOTE ADULT - PROBLEM SELECTOR PLAN 4
Baseline Cr unknown.    BUN/Cr 61/1.86. BUN 30s /Cr 1.3-1.5 in [id number].    - Avoid nephrotoxics.  - Hold Irbesartan as patient with possible LENCHO   - Monitor urine output, BP, electrolytes and volume status.      #Prophylactic measures  F: None  E: Replete PRN  DVT Prophylaxis: SCDs   Code: FULL  Dispo: Pending PT

## 2021-12-11 NOTE — DISCHARGE NOTE PROVIDER - NSDCFUADDAPPT_GEN_ALL_CORE_FT
Please call [phone] to schedule an appointment to follow-up with your cardiologist Dr Jorge Duke and see him within 1 week from your discharge in the hospital.

## 2021-12-15 ENCOUNTER — APPOINTMENT (OUTPATIENT)
Dept: VASCULAR SURGERY | Facility: CLINIC | Age: 86
End: 2021-12-15
Payer: MEDICARE

## 2021-12-15 VITALS
HEIGHT: 60 IN | HEART RATE: 70 BPM | WEIGHT: 122 LBS | DIASTOLIC BLOOD PRESSURE: 73 MMHG | BODY MASS INDEX: 23.95 KG/M2 | SYSTOLIC BLOOD PRESSURE: 158 MMHG

## 2021-12-15 DIAGNOSIS — Z87.891 PERSONAL HISTORY OF NICOTINE DEPENDENCE: ICD-10-CM

## 2021-12-15 PROCEDURE — 99213 OFFICE O/P EST LOW 20 MIN: CPT

## 2021-12-16 NOTE — ASSESSMENT
[FreeTextEntry1] : 89 y/o F w/ new, traumatic LUE skin abrasion after IV attempt. On exam, LUE w/ superficial skin abrasion on lateral left arm at the level of the elbow. Base with granulating tissue, irregular borders and mild surrounding erythema. Scattered bruising throughout the arm. Pt advised to complete daily wound care with peroxide, bacitracin and cover with non-adherent dressing. Pt to f/u next week for wound check.  [Arterial/Venous Disease] : arterial/venous disease [Ulcer Care] : ulcer care

## 2021-12-16 NOTE — PHYSICAL EXAM
[Respiratory Effort] : normal respiratory effort [Normal Rate and Rhythm] : normal rate and rhythm [Alert] : alert [Oriented to Person] : oriented to person [Oriented to Place] : oriented to place [Oriented to Time] : oriented to time [Calm] : calm [2+] : left 2+ [Ankle Swelling (On Exam)] : not present [Varicose Veins Of Lower Extremities] : not present [] : not present [Abdomen Tenderness] : ~T ~M No abdominal tenderness [de-identified] : Cooperative, talkative and friendly [de-identified] : NC/AT  [de-identified] : Supple  [FreeTextEntry1] : LUE: superficial skin abrasion on lateral left arm at the level of the elbow. Base with granulating tissue, irregular borders and mild surrounding erythema. Scattered bruising throughout the arm.  [de-identified] : FROM 5/5 x 4. Ambulates with a cane. [de-identified] : see cardiovasc section

## 2021-12-16 NOTE — ADDENDUM
[FreeTextEntry1] : I, Dr. Andres Medina, personally performed the evaluation and management (E/M) services for this established patient who presents today with (a) new problem(s)/exacerbation of (an) existing condition(s).  That E/M includes conducting the examination, assessing all new/exacerbated conditions, and establishing a new plan of care.  Today, my ACP, Fe Colon NP, was here to observe my evaluation and management services for this new problem/exacerbated condition to be followed going forward.

## 2021-12-16 NOTE — PROCEDURE
[FreeTextEntry1] : SONJAE wound: cleaned with peroxide, bacitracin applied and non-adherent dressing

## 2021-12-16 NOTE — HISTORY OF PRESENT ILLNESS
[FreeTextEntry1] : 89 y/o F former smoker w/h/o L leg melanoma s/p tibial repair (2013), irritable bowel syndrome, and LLE traumatic, slow healing wound secondary to falling off the bed and hitting her leg with the night stand (healed). Patient initially referred here by Dr. Jorge Whitten. She presents today with a new wound on the left arm.. She presents with her aide who reports the pt fell in the bathtub about a week ago. The pt did not hit her head but was not able to stand up for hours before her aide got there. She was transported to the hospital a series of tests were completed, including CT of the head. She reports a doctor tried to put an IV on her left arm and caused the wound she has there. "The skin just came off". She would like to know if it is infected and how to care for it. It is tender to touch. Denies any drainage from the wound, fever or chills. \par \par Pt retired from running fashion shows. \par Pt accompanied by her personal aide.

## 2021-12-20 DIAGNOSIS — J44.9 CHRONIC OBSTRUCTIVE PULMONARY DISEASE, UNSPECIFIED: ICD-10-CM
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
## 2021-12-20 DIAGNOSIS — Z87.891 PERSONAL HISTORY OF NICOTINE DEPENDENCE: ICD-10-CM

## 2021-12-20 DIAGNOSIS — K21.9 GASTRO-ESOPHAGEAL REFLUX DISEASE WITHOUT ESOPHAGITIS: ICD-10-CM

## 2021-12-20 DIAGNOSIS — S06.5X9A TRAUMATIC SUBDURAL HEMORRHAGE WITH LOSS OF CONSCIOUSNESS OF UNSPECIFIED DURATION, INITIAL ENCOUNTER: ICD-10-CM

## 2021-12-20 DIAGNOSIS — I10 ESSENTIAL (PRIMARY) HYPERTENSION: ICD-10-CM

## 2021-12-20 DIAGNOSIS — R29.6 REPEATED FALLS: ICD-10-CM
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
## 2021-12-20 DIAGNOSIS — E78.5 HYPERLIPIDEMIA, UNSPECIFIED: ICD-10-CM

## 2021-12-20 DIAGNOSIS — K86.89 OTHER SPECIFIED DISEASES OF PANCREAS: ICD-10-CM

## 2021-12-20 DIAGNOSIS — N18.4 CHRONIC KIDNEY DISEASE, STAGE 4 (SEVERE): ICD-10-CM

## 2021-12-20 DIAGNOSIS — K52.9 NONINFECTIVE GASTROENTERITIS AND COLITIS, UNSPECIFIED: ICD-10-CM

## 2021-12-20 DIAGNOSIS — I12.9 HYPERTENSIVE CHRONIC KIDNEY DISEASE WITH STAGE 1 THROUGH STAGE 4 CHRONIC KIDNEY DISEASE, OR UNSPECIFIED CHRONIC KIDNEY DISEASE: ICD-10-CM

## 2021-12-30 PROCEDURE — 83880 ASSAY OF NATRIURETIC PEPTIDE: CPT

## 2021-12-30 PROCEDURE — 71045 X-RAY EXAM CHEST 1 VIEW: CPT

## 2021-12-30 PROCEDURE — 84439 ASSAY OF FREE THYROXINE: CPT

## 2021-12-30 PROCEDURE — 93005 ELECTROCARDIOGRAM TRACING: CPT

## 2021-12-30 PROCEDURE — 82550 ASSAY OF CK (CPK): CPT

## 2021-12-30 PROCEDURE — 87635 SARS-COV-2 COVID-19 AMP PRB: CPT

## 2021-12-30 PROCEDURE — 84484 ASSAY OF TROPONIN QUANT: CPT

## 2021-12-30 PROCEDURE — 85610 PROTHROMBIN TIME: CPT

## 2021-12-30 PROCEDURE — 97161 PT EVAL LOW COMPLEX 20 MIN: CPT

## 2021-12-30 PROCEDURE — 93306 TTE W/DOPPLER COMPLETE: CPT

## 2021-12-30 PROCEDURE — 83735 ASSAY OF MAGNESIUM: CPT

## 2021-12-30 PROCEDURE — 85027 COMPLETE CBC AUTOMATED: CPT

## 2021-12-30 PROCEDURE — 36415 COLL VENOUS BLD VENIPUNCTURE: CPT

## 2021-12-30 PROCEDURE — 85025 COMPLETE CBC W/AUTO DIFF WBC: CPT

## 2021-12-30 PROCEDURE — 80048 BASIC METABOLIC PNL TOTAL CA: CPT

## 2021-12-30 PROCEDURE — 82553 CREATINE MB FRACTION: CPT

## 2021-12-30 PROCEDURE — 90715 TDAP VACCINE 7 YRS/> IM: CPT

## 2021-12-30 PROCEDURE — 90471 IMMUNIZATION ADMIN: CPT

## 2021-12-30 PROCEDURE — 85730 THROMBOPLASTIN TIME PARTIAL: CPT

## 2021-12-30 PROCEDURE — 70450 CT HEAD/BRAIN W/O DYE: CPT | Mod: MA

## 2021-12-30 PROCEDURE — 99285 EMERGENCY DEPT VISIT HI MDM: CPT | Mod: 25

## 2021-12-30 PROCEDURE — 84443 ASSAY THYROID STIM HORMONE: CPT

## 2021-12-30 PROCEDURE — 80053 COMPREHEN METABOLIC PANEL: CPT

## 2021-12-30 PROCEDURE — 81001 URINALYSIS AUTO W/SCOPE: CPT

## 2021-12-30 PROCEDURE — 84100 ASSAY OF PHOSPHORUS: CPT

## 2022-01-31 NOTE — PATIENT PROFILE ADULT - NSTOBACCONEVERSMOKERY/N_GEN_A
This note was not shared with the patient due to patient requested    Treatment Recommendations- Risks Benefits     Immediate Medical/Psychiatric/Psychotherapy Treatments and Any Precautions:     Start innovation, medication management, group therapy  Continue Celexa 40 mg p o  Daily  Continue Xanax 0 5 mg p o  T i d  P r n  For severe anxiety    Risks, Benefits And Possible Side Effects Of Medications:  Risks, benefits, and possible side effects of medications explained to patient and patient verbalizes understanding    Controlled Medication Discussion: Discussed with patient the risks of sedation, respiratory depression, impairment of ability to drive and potential for abuse and addiction related to treatment with benzodiazepine medications  The patient understands risk of treatment with benzodiazepine medications, agrees to not drive if feels impaired and agrees to take medications as prescribed  and The patient has been filling controlled prescriptions on time as prescribed to [facility]         Assessment/Plan:       Diagnoses and all orders for this visit:    Recurrent major depressive episodes, moderate (HCC)    Generalized anxiety disorder          Subjective:     Patient ID: Smith Chavez is a 40 y o  female with major depressive disorder, denies anxiety disorder who presented to this program secondary to increased depression, increased anxiety, sleep disturbances, increased appetite, lack of interest in doing things no energy, no motivation, decreased concentration, she feels hopeless, passive dead wishes and agitation  She has marital issues, work-related stressors, school stressors and family issues  She never had been admitted to a psychiatric unit  She has good compliant with treatment  She states that she feels depressed, she feels tired all the time, she had a sleep difficulties racing thoughts    She have strange relationship with her mother for the last 3 years, she has 1 sister date get along but she feels that she has limited support system  She states that she is very sensitive to medication  She states that she have some dead wishes but denies any plan or intent to herself  She had decrease interest in doing things, she feels hopeless, she has decreased concentration  She denies any psychotic symptoms or manic episodes  PHQ-9 is 16  HPI ROS Appetite Changes and Sleep: increased appetite, decreased energy, recent weight gain(20lbs) and decrease in number of sleep hours few    Review Of Systems:     Mood Anxiety and Depression   Behavior Normal    Thought Content Normal   General Relationship Problems and Sleep Disturbances   Personality Normal   Other Psych Symptoms Normal   Constitutional Negative   ENT Negative   Cardiovascular Negative   Respiratory Negative   Gastrointestinal Negative   Genitourinary Negative   Musculoskeletal Negative   Integumentary Negative   Neurological Negative   Endocrine Normal    Other Symptoms Normal              Laboratory Results: No results found for this or any previous visit  Substance Abuse History:  Social History     Substance and Sexual Activity   Drug Use No       Family Psychiatric History:   Family History   Problem Relation Age of Onset    Depression Mother     Osteoporosis Mother     Testicular cancer Father     Cancer Father     Hypertension Father     Cancer Family     Diabetes Family     Heart disease Family     Stroke Family         Stroke syndrome    Alcohol abuse Paternal Grandfather     Mental illness Paternal Grandmother     Drug abuse Neg Hx     Completed Suicide  Neg Hx        The following portions of the patient's history were reviewed and updated as appropriate:   She  has a past medical history of Anxiety, Candidiasis of mouth, Depression, Hypertension, Memory loss (4/7/2021), Obesity, Sciatica, Skin rash, and Urticaria    She   Patient Active Problem List    Diagnosis Date Noted  Mood disorder ([facility] ) 01/25/2022    Upper respiratory tract infection 11/01/2021    Generalized anxiety disorder 09/20/2021    Acute non-recurrent frontal sinusitis 06/25/2021    Memory loss 04/07/2021    Neck pain 04/07/2021    Balance disorder 04/07/2021    Weakness 04/07/2021    History of COVID-19 12/18/2020    Right sided sciatica 07/09/2018    Low back pain 12/21/2017    Recurrent major depressive episodes, moderate ([facility] ) 04/15/2013    Hyperlipidemia 11/13/2012    Hypertension 11/13/2012     She  has a past surgical history that includes Dental surgery  Her family history includes Alcohol abuse in her paternal grandfather; Cancer in her family and father; Depression in her mother; Diabetes in her family; Heart disease in her family; Hypertension in her father; Mental illness in her paternal grandmother; Osteoporosis in her mother; Stroke in her family; Testicular cancer in her father  She  reports that she has never smoked  She has never used smokeless tobacco  She reports current alcohol use of about 9 0 - 12 0 standard drinks of alcohol per week  She reports that she does not use drugs  Current Outpatient Medications   Medication Sig Dispense Refill    ALPRAZolam (XANAX) 0 5 mg tablet Take 1 tablet (0 5 mg total) by mouth 3 (three) times a day as needed (as neded) 90 tablet 0    citalopram (CeleXA) 40 mg tablet Take 1 tablet (40 mg total) by mouth daily 90 tablet 1    omeprazole (PriLOSEC OTC) 20 MG tablet Take 20 mg by mouth daily      VITAMIN D PO Take 1,000 Units by mouth daily      ibuprofen (MOTRIN) 200 mg tablet Take by mouth every 6 (six) hours as needed for mild pain       No current facility-administered medications for this visit  She is allergic to pollen extract         Objective:     Physical Exam        Mental status:  Appearance calm and cooperative , adequate hygiene and grooming and good eye contact    Mood depressed and anxious   Affect affect appropriate    Speech a normal rate   Thought Processes coherent/organized and normal thought processes   Hallucinations no hallucinations present    Thought Content no delusions   Abnormal Thoughts death wish and no homicidal thoughts    Orientation  oriented to person and place and time   Recent & Remote Memory short term memory intact and long term memory intact   Attention & Concentration Span concentration impaired   Intellect Appears to be of Average Intelligence   Fund of Knowledge displays adequate knowledge of current events, adequate fund of knowledge regarding past history and adequate fund of knowledge regarding vocabulary    Insight Insight intact   Judgement judgment was intact   Muscle Strength Muscle strength and tone were normal and Normal gait    Language no difficulty naming common objects, no difficulty repeating a phrase  and no difficulty writing a sentence    Fund of Knowledge displays adequate knowledge of current events, adequate fund of knowledge regarding past history and adequate fund of knowledge regarding vocabulary    Pain none   Pain Scale 0           Sma Romeo MD No

## 2022-10-07 ENCOUNTER — EMERGENCY (EMERGENCY)
Facility: HOSPITAL | Age: 87
LOS: 1 days | Discharge: LEFT BEFORE DISCHARGE | End: 2022-10-07
Attending: EMERGENCY MEDICINE | Admitting: EMERGENCY MEDICINE
Payer: MEDICARE

## 2022-10-07 VITALS
RESPIRATION RATE: 18 BRPM | TEMPERATURE: 98 F | SYSTOLIC BLOOD PRESSURE: 135 MMHG | HEIGHT: 61 IN | DIASTOLIC BLOOD PRESSURE: 69 MMHG | OXYGEN SATURATION: 98 % | HEART RATE: 84 BPM | WEIGHT: 100.09 LBS

## 2022-10-07 VITALS
SYSTOLIC BLOOD PRESSURE: 151 MMHG | HEART RATE: 67 BPM | TEMPERATURE: 98 F | RESPIRATION RATE: 18 BRPM | DIASTOLIC BLOOD PRESSURE: 70 MMHG | OXYGEN SATURATION: 98 %

## 2022-10-07 DIAGNOSIS — Z98.41 CATARACT EXTRACTION STATUS, RIGHT EYE: ICD-10-CM

## 2022-10-07 DIAGNOSIS — S60.511A ABRASION OF RIGHT HAND, INITIAL ENCOUNTER: ICD-10-CM

## 2022-10-07 DIAGNOSIS — M50.30 OTHER CERVICAL DISC DEGENERATION, UNSPECIFIED CERVICAL REGION: ICD-10-CM

## 2022-10-07 DIAGNOSIS — C43.9 MALIGNANT MELANOMA OF SKIN, UNSPECIFIED: ICD-10-CM

## 2022-10-07 DIAGNOSIS — Z90.710 ACQUIRED ABSENCE OF BOTH CERVIX AND UTERUS: ICD-10-CM
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
## 2022-10-07 DIAGNOSIS — G31.9 DEGENERATIVE DISEASE OF NERVOUS SYSTEM, UNSPECIFIED: ICD-10-CM

## 2022-10-07 DIAGNOSIS — J44.9 CHRONIC OBSTRUCTIVE PULMONARY DISEASE, UNSPECIFIED: ICD-10-CM

## 2022-10-07 DIAGNOSIS — K21.9 GASTRO-ESOPHAGEAL REFLUX DISEASE WITHOUT ESOPHAGITIS: ICD-10-CM

## 2022-10-07 DIAGNOSIS — I12.9 HYPERTENSIVE CHRONIC KIDNEY DISEASE WITH STAGE 1 THROUGH STAGE 4 CHRONIC KIDNEY DISEASE, OR UNSPECIFIED CHRONIC KIDNEY DISEASE: ICD-10-CM
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
## 2022-10-07 DIAGNOSIS — Z98.42 CATARACT EXTRACTION STATUS, LEFT EYE: ICD-10-CM

## 2022-10-07 DIAGNOSIS — Z98.41 CATARACT EXTRACTION STATUS, RIGHT EYE: Chronic | ICD-10-CM

## 2022-10-07 DIAGNOSIS — M43.12 SPONDYLOLISTHESIS, CERVICAL REGION: ICD-10-CM

## 2022-10-07 DIAGNOSIS — K86.89 OTHER SPECIFIED DISEASES OF PANCREAS: ICD-10-CM

## 2022-10-07 DIAGNOSIS — Z96.649 PRESENCE OF UNSPECIFIED ARTIFICIAL HIP JOINT: ICD-10-CM

## 2022-10-07 DIAGNOSIS — M41.9 SCOLIOSIS, UNSPECIFIED: ICD-10-CM

## 2022-10-07 DIAGNOSIS — R29.6 REPEATED FALLS: ICD-10-CM

## 2022-10-07 DIAGNOSIS — J84.10 PULMONARY FIBROSIS, UNSPECIFIED: ICD-10-CM

## 2022-10-07 DIAGNOSIS — M48.02 SPINAL STENOSIS, CERVICAL REGION: ICD-10-CM

## 2022-10-07 DIAGNOSIS — I34.81 NONRHEUMATIC MITRAL (VALVE) ANNULUS CALCIFICATION: ICD-10-CM

## 2022-10-07 DIAGNOSIS — Z53.29 PROCEDURE AND TREATMENT NOT CARRIED OUT BECAUSE OF PATIENT'S DECISION FOR OTHER REASONS: ICD-10-CM

## 2022-10-07 DIAGNOSIS — I70.0 ATHEROSCLEROSIS OF AORTA: ICD-10-CM

## 2022-10-07 DIAGNOSIS — M47.812 SPONDYLOSIS WITHOUT MYELOPATHY OR RADICULOPATHY, CERVICAL REGION: ICD-10-CM

## 2022-10-07 DIAGNOSIS — N18.4 CHRONIC KIDNEY DISEASE, STAGE 4 (SEVERE): ICD-10-CM

## 2022-10-07 DIAGNOSIS — E78.5 HYPERLIPIDEMIA, UNSPECIFIED: ICD-10-CM

## 2022-10-07 DIAGNOSIS — Z87.820 PERSONAL HISTORY OF TRAUMATIC BRAIN INJURY: ICD-10-CM

## 2022-10-07 DIAGNOSIS — S80.819A ABRASION, UNSPECIFIED LOWER LEG, INITIAL ENCOUNTER: ICD-10-CM

## 2022-10-07 LAB
ALBUMIN SERPL ELPH-MCNC: 4.3 G/DL — SIGNIFICANT CHANGE UP (ref 3.3–5)
ALP SERPL-CCNC: 67 U/L — SIGNIFICANT CHANGE UP (ref 40–120)
ALT FLD-CCNC: 46 U/L — HIGH (ref 10–45)
ANION GAP SERPL CALC-SCNC: 11 MMOL/L — SIGNIFICANT CHANGE UP (ref 5–17)
APPEARANCE UR: CLEAR — SIGNIFICANT CHANGE UP
AST SERPL-CCNC: 76 U/L — HIGH (ref 10–40)
BACTERIA # UR AUTO: PRESENT /HPF
BASOPHILS # BLD AUTO: 0.04 K/UL — SIGNIFICANT CHANGE UP (ref 0–0.2)
BASOPHILS NFR BLD AUTO: 0.5 % — SIGNIFICANT CHANGE UP (ref 0–2)
BILIRUB SERPL-MCNC: 0.4 MG/DL — SIGNIFICANT CHANGE UP (ref 0.2–1.2)
BILIRUB UR-MCNC: NEGATIVE — SIGNIFICANT CHANGE UP
BUN SERPL-MCNC: 69 MG/DL — HIGH (ref 7–23)
CALCIUM SERPL-MCNC: 11.7 MG/DL — HIGH (ref 8.4–10.5)
CHLORIDE SERPL-SCNC: 96 MMOL/L — SIGNIFICANT CHANGE UP (ref 96–108)
CO2 SERPL-SCNC: 25 MMOL/L — SIGNIFICANT CHANGE UP (ref 22–31)
COLOR SPEC: YELLOW — SIGNIFICANT CHANGE UP
COMMENT - URINE: SIGNIFICANT CHANGE UP
CREAT SERPL-MCNC: 1.96 MG/DL — HIGH (ref 0.5–1.3)
DIFF PNL FLD: NEGATIVE — SIGNIFICANT CHANGE UP
EGFR: 23 ML/MIN/1.73M2 — LOW
EOSINOPHIL # BLD AUTO: 0.16 K/UL — SIGNIFICANT CHANGE UP (ref 0–0.5)
EOSINOPHIL NFR BLD AUTO: 2 % — SIGNIFICANT CHANGE UP (ref 0–6)
EPI CELLS # UR: SIGNIFICANT CHANGE UP /HPF (ref 0–5)
GLUCOSE SERPL-MCNC: 120 MG/DL — HIGH (ref 70–99)
GLUCOSE UR QL: NEGATIVE — SIGNIFICANT CHANGE UP
HCT VFR BLD CALC: 30.7 % — LOW (ref 34.5–45)
HGB BLD-MCNC: 10.3 G/DL — LOW (ref 11.5–15.5)
IMM GRANULOCYTES NFR BLD AUTO: 0.3 % — SIGNIFICANT CHANGE UP (ref 0–0.9)
KETONES UR-MCNC: NEGATIVE — SIGNIFICANT CHANGE UP
LEUKOCYTE ESTERASE UR-ACNC: NEGATIVE — SIGNIFICANT CHANGE UP
LYMPHOCYTES # BLD AUTO: 0.85 K/UL — LOW (ref 1–3.3)
LYMPHOCYTES # BLD AUTO: 10.6 % — LOW (ref 13–44)
MCHC RBC-ENTMCNC: 31 PG — SIGNIFICANT CHANGE UP (ref 27–34)
MCHC RBC-ENTMCNC: 33.6 GM/DL — SIGNIFICANT CHANGE UP (ref 32–36)
MCV RBC AUTO: 92.5 FL — SIGNIFICANT CHANGE UP (ref 80–100)
MONOCYTES # BLD AUTO: 0.5 K/UL — SIGNIFICANT CHANGE UP (ref 0–0.9)
MONOCYTES NFR BLD AUTO: 6.3 % — SIGNIFICANT CHANGE UP (ref 2–14)
NEUTROPHILS # BLD AUTO: 6.42 K/UL — SIGNIFICANT CHANGE UP (ref 1.8–7.4)
NEUTROPHILS NFR BLD AUTO: 80.3 % — HIGH (ref 43–77)
NITRITE UR-MCNC: NEGATIVE — SIGNIFICANT CHANGE UP
NRBC # BLD: 0 /100 WBCS — SIGNIFICANT CHANGE UP (ref 0–0)
PH UR: 6.5 — SIGNIFICANT CHANGE UP (ref 5–8)
PLATELET # BLD AUTO: 194 K/UL — SIGNIFICANT CHANGE UP (ref 150–400)
POTASSIUM SERPL-MCNC: 4.6 MMOL/L — SIGNIFICANT CHANGE UP (ref 3.5–5.3)
POTASSIUM SERPL-SCNC: 4.6 MMOL/L — SIGNIFICANT CHANGE UP (ref 3.5–5.3)
PROT SERPL-MCNC: 7.4 G/DL — SIGNIFICANT CHANGE UP (ref 6–8.3)
PROT UR-MCNC: 30 MG/DL
RBC # BLD: 3.32 M/UL — LOW (ref 3.8–5.2)
RBC # FLD: 13.4 % — SIGNIFICANT CHANGE UP (ref 10.3–14.5)
RBC CASTS # UR COMP ASSIST: < 5 /HPF — SIGNIFICANT CHANGE UP
SODIUM SERPL-SCNC: 132 MMOL/L — LOW (ref 135–145)
SP GR SPEC: 1.01 — SIGNIFICANT CHANGE UP (ref 1–1.03)
UROBILINOGEN FLD QL: 0.2 E.U./DL — SIGNIFICANT CHANGE UP
WBC # BLD: 7.99 K/UL — SIGNIFICANT CHANGE UP (ref 3.8–10.5)
WBC # FLD AUTO: 7.99 K/UL — SIGNIFICANT CHANGE UP (ref 3.8–10.5)
WBC UR QL: < 5 /HPF — SIGNIFICANT CHANGE UP

## 2022-10-07 PROCEDURE — 72170 X-RAY EXAM OF PELVIS: CPT

## 2022-10-07 PROCEDURE — 99284 EMERGENCY DEPT VISIT MOD MDM: CPT | Mod: 25

## 2022-10-07 PROCEDURE — 36415 COLL VENOUS BLD VENIPUNCTURE: CPT

## 2022-10-07 PROCEDURE — 72170 X-RAY EXAM OF PELVIS: CPT | Mod: 26,77

## 2022-10-07 PROCEDURE — 97161 PT EVAL LOW COMPLEX 20 MIN: CPT

## 2022-10-07 PROCEDURE — 72125 CT NECK SPINE W/O DYE: CPT | Mod: 26,MA

## 2022-10-07 PROCEDURE — 80053 COMPREHEN METABOLIC PANEL: CPT

## 2022-10-07 PROCEDURE — 72125 CT NECK SPINE W/O DYE: CPT | Mod: MA

## 2022-10-07 PROCEDURE — 81001 URINALYSIS AUTO W/SCOPE: CPT

## 2022-10-07 PROCEDURE — 71045 X-RAY EXAM CHEST 1 VIEW: CPT | Mod: 26

## 2022-10-07 PROCEDURE — 70450 CT HEAD/BRAIN W/O DYE: CPT | Mod: 26,MA

## 2022-10-07 PROCEDURE — 71045 X-RAY EXAM CHEST 1 VIEW: CPT

## 2022-10-07 PROCEDURE — 72170 X-RAY EXAM OF PELVIS: CPT | Mod: 26,59

## 2022-10-07 PROCEDURE — 85025 COMPLETE CBC W/AUTO DIFF WBC: CPT

## 2022-10-07 PROCEDURE — 73502 X-RAY EXAM HIP UNI 2-3 VIEWS: CPT | Mod: 26

## 2022-10-07 PROCEDURE — 70450 CT HEAD/BRAIN W/O DYE: CPT | Mod: MA

## 2022-10-07 RX ORDER — SODIUM CHLORIDE 9 MG/ML
500 INJECTION INTRAMUSCULAR; INTRAVENOUS; SUBCUTANEOUS ONCE
Refills: 0 | Status: COMPLETED | OUTPATIENT
Start: 2022-10-07 | End: 2022-10-07

## 2022-10-07 RX ORDER — ACETAMINOPHEN 500 MG
975 TABLET ORAL ONCE
Refills: 0 | Status: COMPLETED | OUTPATIENT
Start: 2022-10-07 | End: 2022-10-07

## 2022-10-07 RX ADMIN — SODIUM CHLORIDE 1000 MILLILITER(S): 9 INJECTION INTRAMUSCULAR; INTRAVENOUS; SUBCUTANEOUS at 11:21

## 2022-10-07 RX ADMIN — Medication 975 MILLIGRAM(S): at 12:18

## 2022-10-07 NOTE — ED PROVIDER NOTE - PATIENT PORTAL LINK FT
You can access the FollowMyHealth Patient Portal offered by [facility] by registering at the following website: [email or web address]. By joining [employer]’s FollowMyHealth portal, you will also be able to view your health information using other applications (apps) compatible with our system.

## 2022-10-07 NOTE — ED ADULT NURSE REASSESSMENT NOTE - NS ED NURSE REASSESS COMMENT FT1
Pt still awaiting ambulette. Discharge reviewed by day shift RN. Pt has no further questions for this RN.

## 2022-10-07 NOTE — ED ADULT NURSE REASSESSMENT NOTE - NS ED NURSE REASSESS COMMENT FT1
pt assessed, pt A&Ox4, pt T&P q2 hrs, pt boosted, pt denies any pain, external urine catheter in place, pt waiting for PT evaluation, safety and comfort maintained, will continue to monitor.

## 2022-10-07 NOTE — ED PROVIDER NOTE - OBJECTIVE STATEMENT
MH HTN, HLD, GERD, COPD, CKD IV, pancreatic insufficiency, melanoma, hx of sdh s/p multiple falls ?2/3 since yesterday, pt states twice, states tripped on marble floor, has walker. Denies hitting head, LOC, vision changes, focal weakness/numbness, neck/back pain, SOB/CP, HA, abd pain, NVD,  Not on AC. MH HTN, HLD, GERD, COPD, CKD IV, pancreatic insufficiency, melanoma, hx of sdh s/p multiple falls ?2/3 since yesterday, pt states twice, EMS states more, states tripped on marble floor, has walker. Denies hitting head, LOC, vision changes, focal weakness/numbness, neck/back pain, SOB/CP, HA, abd pain, NVD,  Not on AC. has abrasion to RUE and lower ext.

## 2022-10-07 NOTE — PHYSICAL THERAPY INITIAL EVALUATION ADULT - GENERAL OBSERVATIONS, REHAB EVAL
Pt received semi supine, +purewick, +R forearm kerlex bandage C/D/I, NAD, agreeable to PT. Cleared by MD Acosta for mobility.

## 2022-10-07 NOTE — ED PROVIDER NOTE - NSFOLLOWUPINSTRUCTIONS_ED_ALL_ED_FT
Please keep abrasion wrapped in gauze, apply bacitracin  Use walker for ambulation  Follow up with your PMD  [facility]  Family Medicine  [address] [place] [address]  Phone: [phone]

## 2022-10-07 NOTE — ED PROVIDER NOTE - CLINICAL SUMMARY MEDICAL DECISION MAKING FREE TEXT BOX
s/p fall s/p fall, denies head injury but hx of SDH, extremity abrasions, will obtain trauma eval; labs, CTh/cspine, cxr, pelvis xr, PT eval, SW eval, UA ?infectious etiology, reassess.

## 2022-10-07 NOTE — ED PROVIDER NOTE - PHYSICAL EXAMINATION
CONSTITUTIONAL: Awake, alert and in no apparent distress.  HEENT: Head is atraumatic. Eyes clear bilaterally, normal EOMI. Airway patent.  CARDIAC: Normal rate, regular rhythm.  Heart sounds S1, S2.   RESPIRATORY: Breath sounds clear and equal bilaterally. no tachypnea, respiratory distress.   GASTROINTESTINAL: Abdomen soft, non-tender, no guarding, distension.  MUSCULOSKELETAL: Spine appears normal, no midline spinal tenderness, range of motion is not limited, no muscle or joint tenderness. no bony tenderness. large abrasion to   NEUROLOGICAL: Alert, no focal deficits, no motor or sensory deficits.  SKIN: Skin normal color for race, warm, dry and intact. No evidence of rash.  PSYCHIATRIC: Normal mood and affect. no apparent risk to self or others. CONSTITUTIONAL: Awake, alert and in no apparent distress.  HEENT: Head is atraumatic. Eyes clear bilaterally, normal EOMI. Airway patent.  CARDIAC: Normal rate, regular rhythm.  Heart sounds S1, S2.   RESPIRATORY: Breath sounds clear and equal bilaterally. no tachypnea, respiratory distress.   GASTROINTESTINAL: Abdomen soft, non-tender, no guarding, distension.  MUSCULOSKELETAL: Spine appears normal, no midline spinal tenderness, range of motion is not limited, no muscle or joint tenderness. no bony tenderness. no bony tenderness to extremities  NEUROLOGICAL: Alert, no focal deficits, no motor or sensory deficits.  SKIN: large skin abrasion to RUE, warm, dry and intact. No evidence of rash.  PSYCHIATRIC: Normal mood and affect. no apparent risk to self or others.

## 2022-10-07 NOTE — ED PROVIDER NOTE - PROGRESS NOTE DETAILS
labs noted, ivf initiated. SW and PT evaluated pt, rec admisison for LINDA placement, pt refusing,     Patient desires to leave emergency department against medical advice, prior to completion of my desired evaluation and treatment plan.  Patient's mental status is normal, patient is fully alert and oriented x person, place and time.  Patient demonstrates clear reasoning capabilities and capacity to make this decision.  Patient fully understands the explained risks involved with this decision including worsening of medical condition, missed and or delayed diagnosis, permanent disability and death.  All alternative options given to patient and still desires discharge against medical advice from ED and will follow up as an outpatient.  Patient given the option to return to ED at any time to have further evaluation and treatment.

## 2022-10-07 NOTE — PHYSICAL THERAPY INITIAL EVALUATION ADULT - LEVEL OF INDEPENDENCE: STAIR NEGOTIATION, REHAB EVAL
TBA (not assessed due to pt reported to PT multiple times that she doesn't have stairs at home..later reported to SW that she has 2 stairs to enter)

## 2022-10-07 NOTE — ED ADULT TRIAGE NOTE - CHIEF COMPLAINT QUOTE
BIBEMS s/p multiple falls last night. Skin tear noted to R forearm, ecchymosis to RUE and LUE. Pt denies head inj, LOC. Taking ASA daily. As per EMS, pt w unsteady gait on scene. Pt AOX4 on arrival. no active bleeding.

## 2022-10-07 NOTE — PHYSICAL THERAPY INITIAL EVALUATION ADULT - GAIT DEVIATIONS NOTED, PT EVAL
intermittent min unsteadiness/decreased yajaira/decreased step length/decreased stride length/decreased weight-shifting ability

## 2022-10-07 NOTE — PHYSICAL THERAPY INITIAL EVALUATION ADULT - ADDITIONAL COMMENTS
Pt reports that she lives alone in an elevator access apt with no LARRY. Pt confirmed with PT multiple times that she has no stairs to enter, however later informed CLAUDIO Burgos that she has 2 stairs in her lobby. Pt reports having HHA 8am-8pm x7 days a week that helps her with dressing, bathing, cooking and cleaning. HHA not at bedside, and pt reports she is doing laundy. Pt separately confirmed to CLAUDIO Burgos that pt "was at the dentist." Per pt report she fell 2x in the past 24 hours "because the walker got stuck on something." Pt also reports she goes to [facility] 2x a week, and has a SC that she regularly uses to ambulate as well.

## 2022-10-07 NOTE — ED ADULT NURSE NOTE - OBJECTIVE STATEMENT
89 y/o female s/p multiple falls last night. Skin tear noted to R forearm, ecchymosis to RUE and LUE, abrasion b/l knee. Pt denies head injury, -LOC and anticoagulation. As per EMS, pt w unsteady gait on scene.

## 2023-03-30 ENCOUNTER — LABORATORY RESULT (OUTPATIENT)
Age: 88
End: 2023-03-30

## 2023-03-30 ENCOUNTER — APPOINTMENT (OUTPATIENT)
Dept: NEPHROLOGY | Facility: CLINIC | Age: 88
End: 2023-03-30
Payer: MEDICARE

## 2023-03-30 ENCOUNTER — NON-APPOINTMENT (OUTPATIENT)
Age: 88
End: 2023-03-30

## 2023-03-30 PROCEDURE — 99205 OFFICE O/P NEW HI 60 MIN: CPT

## 2023-04-02 VITALS — SYSTOLIC BLOOD PRESSURE: 146 MMHG | RESPIRATION RATE: 12 BRPM | DIASTOLIC BLOOD PRESSURE: 70 MMHG | HEART RATE: 66 BPM

## 2023-04-02 NOTE — PHYSICAL EXAM
[General Appearance - Alert] : alert [General Appearance - In No Acute Distress] : in no acute distress [Sclera] : the sclera and conjunctiva were normal [PERRL With Normal Accommodation] : pupils were equal in size, round, and reactive to light [Extraocular Movements] : extraocular movements were intact [Outer Ear] : the ears and nose were normal in appearance [Oropharynx] : the oropharynx was normal [Neck Appearance] : the appearance of the neck was normal [Neck Cervical Mass (___cm)] : no neck mass was observed [Jugular Venous Distention Increased] : there was no jugular-venous distention [Respiration, Rhythm And Depth] : normal respiratory rhythm and effort [Exaggerated Use Of Accessory Muscles For Inspiration] : no accessory muscle use [Auscultation Breath Sounds / Voice Sounds] : lungs were clear to auscultation bilaterally [Heart Rate And Rhythm] : heart rate was normal and rhythm regular [Heart Sounds] : normal S1 and S2 [Heart Sounds Gallop] : no gallops [Murmurs] : no murmurs [Heart Sounds Pericardial Friction Rub] : no pericardial rub [Edema] : there was no peripheral edema [Veins - Varicosity Changes] : there were no varicosital changes [Bowel Sounds] : normal bowel sounds [Abdomen Soft] : soft [Abdomen Tenderness] : non-tender [No CVA Tenderness] : no ~M costovertebral angle tenderness [Abdomen Mass (___ Cm)] : no abdominal mass palpated [No Spinal Tenderness] : no spinal tenderness [Skin Color & Pigmentation] : normal skin color and pigmentation [Skin Turgor] : normal skin turgor [] : no rash [Affect] : the affect was normal [Mood] : the mood was normal [FreeTextEntry1] : Elderly, thin

## 2023-04-02 NOTE — HISTORY OF PRESENT ILLNESS
[FreeTextEntry1] : Patient is an 89 yo F, HTN, former smoker with L Leg melanoma s/p tibial repair, IBS, PVD, who presents for CKD care\par \par She is accompanied by her two HHAs who help to provide history although she is very sharp but hard of hearing. She is still active but doesn’t exercise specifically or eat large meals. \par \par \par Reviewed labs in detail with patient - elevated sCr, CKD likely 4, hyper calcemia. On medication review has supplement of vitamin D and calcium, Will STOP vitamin D and calcium supplementation for now\par \par Med list scanned in

## 2023-04-02 NOTE — ASSESSMENT
[FreeTextEntry1] : Patient is an 89 yo F, HTN, former smoker with L Leg melanoma s/p tibial repair, IBS, PVD, who presents for CKD care\par \par CKD - likely 2/2 aging and HTN, smoking history, will do brief workup below to ensure not any active kidney disease, dose medications appropriatley for GFR\par \par HTN - elevated in office today, will monitor for now and not over treat given age and frailty\par \par Hypercalcemia - likely 2/2 supplements and dehydration, will encourage fluids and d/c supplements for now\par \par Anemia-CKD - likely some contribution of CKD, will check iron stores and replete as needed, consider EPO \par \par RTC in 2-3 weeks to check hydration status and hypercalcemic, and to discuss results

## 2023-04-27 ENCOUNTER — APPOINTMENT (OUTPATIENT)
Dept: NEPHROLOGY | Facility: CLINIC | Age: 88
End: 2023-04-27
Payer: MEDICARE

## 2023-04-27 PROCEDURE — 99215 OFFICE O/P EST HI 40 MIN: CPT

## 2023-05-01 VITALS — RESPIRATION RATE: 12 BRPM | HEART RATE: 64 BPM | SYSTOLIC BLOOD PRESSURE: 128 MMHG | DIASTOLIC BLOOD PRESSURE: 70 MMHG

## 2023-05-01 NOTE — PHYSICAL EXAM
[General Appearance - Alert] : alert [General Appearance - In No Acute Distress] : in no acute distress [Sclera] : the sclera and conjunctiva were normal [PERRL With Normal Accommodation] : pupils were equal in size, round, and reactive to light [Extraocular Movements] : extraocular movements were intact [Outer Ear] : the ears and nose were normal in appearance [Oropharynx] : the oropharynx was normal [FreeTextEntry1] : hearing aid in place [Neck Appearance] : the appearance of the neck was normal [Neck Cervical Mass (___cm)] : no neck mass was observed [Jugular Venous Distention Increased] : there was no jugular-venous distention [Respiration, Rhythm And Depth] : normal respiratory rhythm and effort [Exaggerated Use Of Accessory Muscles For Inspiration] : no accessory muscle use [Auscultation Breath Sounds / Voice Sounds] : lungs were clear to auscultation bilaterally [Heart Rate And Rhythm] : heart rate was normal and rhythm regular [Heart Sounds] : normal S1 and S2 [Heart Sounds Gallop] : no gallops [Murmurs] : no murmurs [Heart Sounds Pericardial Friction Rub] : no pericardial rub [Edema] : there was no peripheral edema [Veins - Varicosity Changes] : there were no varicosital changes [Bowel Sounds] : normal bowel sounds [Abdomen Soft] : soft [Abdomen Tenderness] : non-tender [Abdomen Mass (___ Cm)] : no abdominal mass palpated [No CVA Tenderness] : no ~M costovertebral angle tenderness [No Spinal Tenderness] : no spinal tenderness [Skin Color & Pigmentation] : normal skin color and pigmentation [Skin Turgor] : normal skin turgor [] : no rash [Affect] : the affect was normal [Mood] : the mood was normal

## 2023-05-01 NOTE — HISTORY OF PRESENT ILLNESS
[FreeTextEntry1] : Patient is an 89 yo F, HTN, former smoker with L Leg melanoma s/p tibial repair, IBS, PVD, who presents for CKD care\par \par \par Has definitely improved overall, physical exam improved. Wearing hearing aids and more able ot take part in conversation\par Wearing stockings with improvement in edema\par Still taking tums probably too much, 4x per day states the HHAs while patient states closer to 3x, does not think that many. Will review labs from 18th once sent over to be sure no further increase in calcium and improving.

## 2023-05-01 NOTE — ASSESSMENT
[FreeTextEntry1] : Patient is an 89 yo F, HTN, former smoker with L Leg melanoma s/p tibial repair, IBS, PVD, who presents for CKD care\par \par CKD - likely 2/2 aging and HTN, smoking history, brief workup negative except for proteinuria, dose medications appropriately for GFR\par \par HTN - closer to goal in office today, will monitor for now and not over treat given age and frailty\par \par Hypercalcemia - likely 2/2 supplements and dehydration, will encourage fluids and d/c supplements for now, still on tums for GI distress\par \par Anemia-CKD - likely some contribution of CKD, will check iron stores and replete as needed, consider EPO \par \par RTC in 4-5 weeks to check hydration status and hypercalcemia, labs with house calls prior

## 2023-06-01 ENCOUNTER — APPOINTMENT (OUTPATIENT)
Dept: NEPHROLOGY | Facility: CLINIC | Age: 88
End: 2023-06-01
Payer: MEDICARE

## 2023-06-01 DIAGNOSIS — S40.812A ABRASION OF LEFT UPPER ARM, INITIAL ENCOUNTER: ICD-10-CM

## 2023-06-01 PROCEDURE — 99215 OFFICE O/P EST HI 40 MIN: CPT

## 2023-06-01 RX ORDER — SALIVA STIMULANT COMB. NO.3
SPRAY, NON-AEROSOL (ML) MUCOUS MEMBRANE
Qty: 1 | Refills: 0 | Status: ACTIVE | COMMUNITY
Start: 2023-06-01 | End: 1900-01-01

## 2023-06-03 NOTE — PHYSICAL EXAM
[General Appearance - Alert] : alert [General Appearance - In No Acute Distress] : in no acute distress [PERRL With Normal Accommodation] : pupils were equal in size, round, and reactive to light [Sclera] : the sclera and conjunctiva were normal [Extraocular Movements] : extraocular movements were intact [Outer Ear] : the ears and nose were normal in appearance [Oropharynx] : the oropharynx was normal [FreeTextEntry1] : hearing aid in place [Neck Appearance] : the appearance of the neck was normal [Neck Cervical Mass (___cm)] : no neck mass was observed [Jugular Venous Distention Increased] : there was no jugular-venous distention [Respiration, Rhythm And Depth] : normal respiratory rhythm and effort [Exaggerated Use Of Accessory Muscles For Inspiration] : no accessory muscle use [Auscultation Breath Sounds / Voice Sounds] : lungs were clear to auscultation bilaterally [Heart Rate And Rhythm] : heart rate was normal and rhythm regular [Heart Sounds] : normal S1 and S2 [Heart Sounds Gallop] : no gallops [Murmurs] : no murmurs [Heart Sounds Pericardial Friction Rub] : no pericardial rub [Edema] : there was no peripheral edema [Veins - Varicosity Changes] : there were no varicosital changes [Bowel Sounds] : normal bowel sounds [Abdomen Soft] : soft [Abdomen Tenderness] : non-tender [Abdomen Mass (___ Cm)] : no abdominal mass palpated [No CVA Tenderness] : no ~M costovertebral angle tenderness [No Spinal Tenderness] : no spinal tenderness [Skin Color & Pigmentation] : normal skin color and pigmentation [] : no rash [Skin Turgor] : normal skin turgor [Affect] : the affect was normal [Mood] : the mood was normal

## 2023-06-03 NOTE — HISTORY OF PRESENT ILLNESS
[FreeTextEntry1] : Patient is an 89 yo F, HTN, former smoker with L Leg melanoma s/p tibial repair, IBS, PVD, who presents for CKD care\par \par Wearing compression stockings, much improved edema. \par \par Discussed diet, hydration. Hydration is lowbut will increase, discussed multiple ways to increase hydration, okay to have flavored drinks if will increase intake. Discussed types of protein shakes and flavors to try as well, will try ensure first. \par

## 2023-06-03 NOTE — ASSESSMENT
[FreeTextEntry1] : Patient is an 89 yo F, HTN, former smoker with L Leg melanoma s/p tibial repair, IBS, PVD, who presents for CKD care\par \par CKD - likely 2/2 aging and HTN, smoking history, brief workup negative except for proteinuria, dose medications appropriately for GFR\par \par HTN - at goal in office today, will monitor for now and not over treat given age and frailty\par \par Hypercalcemia - likely 2/2 supplements and dehydration, will encourage fluids and d/c supplements for now, still on tums for GI distress\par \par Anemia-CKD - likely some contribution of CKD, will check iron stores and replete as needed, consider EPO \par \par RTC in 4-5 weeks to check hydration status and hypercalcemia, labs with house calls prior.

## 2023-07-05 ENCOUNTER — LABORATORY RESULT (OUTPATIENT)
Age: 88
End: 2023-07-05

## 2023-07-06 ENCOUNTER — LABORATORY RESULT (OUTPATIENT)
Age: 88
End: 2023-07-06

## 2023-09-22 ENCOUNTER — APPOINTMENT (OUTPATIENT)
Dept: NEPHROLOGY | Facility: CLINIC | Age: 88
End: 2023-09-22
Payer: MEDICARE

## 2023-09-22 VITALS — SYSTOLIC BLOOD PRESSURE: 131 MMHG | HEART RATE: 66 BPM | DIASTOLIC BLOOD PRESSURE: 78 MMHG | RESPIRATION RATE: 12 BRPM

## 2023-09-22 PROCEDURE — 99215 OFFICE O/P EST HI 40 MIN: CPT

## 2023-09-22 RX ORDER — MIRABEGRON 25 MG/1
25 TABLET, FILM COATED, EXTENDED RELEASE ORAL
Qty: 90 | Refills: 3 | Status: ACTIVE | COMMUNITY
Start: 2023-09-22 | End: 1900-01-01

## 2023-11-10 ENCOUNTER — LABORATORY RESULT (OUTPATIENT)
Age: 88
End: 2023-11-10

## 2023-11-13 ENCOUNTER — LABORATORY RESULT (OUTPATIENT)
Age: 88
End: 2023-11-13

## 2024-01-05 ENCOUNTER — APPOINTMENT (OUTPATIENT)
Dept: NEPHROLOGY | Facility: CLINIC | Age: 89
End: 2024-01-05
Payer: MEDICARE

## 2024-01-05 PROCEDURE — G2211 COMPLEX E/M VISIT ADD ON: CPT

## 2024-01-05 PROCEDURE — 99215 OFFICE O/P EST HI 40 MIN: CPT

## 2024-01-09 VITALS — HEART RATE: 64 BPM | RESPIRATION RATE: 12 BRPM | SYSTOLIC BLOOD PRESSURE: 122 MMHG | DIASTOLIC BLOOD PRESSURE: 76 MMHG

## 2024-01-09 NOTE — ASSESSMENT
[FreeTextEntry1] : Patient is an 89 yo F, HTN, former smoker with L Leg melanoma s/p tibial repair, IBS, PVD, who presents for CKD care  CKD - likely 2/2 aging and HTN, smoking history, brief workup negative except for proteinuria, dose medications appropriately for GFR  HTN - at goal in office today, will monitor for now and not over treat given age and frailty  Hypercalcemia - improved off supplements, continue to increase hydration  Anemia-CKD - likely some contribution of CKD, will check iron stores and replete as needed, consider EPO  RTC in 1-2 months to check hydration status and hypercalcemia, labs with house calls prior.

## 2024-01-09 NOTE — PHYSICAL EXAM
[General Appearance - Alert] : alert [General Appearance - In No Acute Distress] : in no acute distress [Sclera] : the sclera and conjunctiva were normal [PERRL With Normal Accommodation] : pupils were equal in size, round, and reactive to light [Extraocular Movements] : extraocular movements were intact [Outer Ear] : the ears and nose were normal in appearance [Oropharynx] : the oropharynx was normal [Neck Appearance] : the appearance of the neck was normal [Neck Cervical Mass (___cm)] : no neck mass was observed [Jugular Venous Distention Increased] : there was no jugular-venous distention [Respiration, Rhythm And Depth] : normal respiratory rhythm and effort [Exaggerated Use Of Accessory Muscles For Inspiration] : no accessory muscle use [Auscultation Breath Sounds / Voice Sounds] : lungs were clear to auscultation bilaterally [Heart Rate And Rhythm] : heart rate was normal and rhythm regular [Heart Sounds] : normal S1 and S2 [Heart Sounds Gallop] : no gallops [Murmurs] : no murmurs [Heart Sounds Pericardial Friction Rub] : no pericardial rub [Edema] : there was no peripheral edema [Veins - Varicosity Changes] : there were no varicosital changes [Bowel Sounds] : normal bowel sounds [Abdomen Soft] : soft [Abdomen Tenderness] : non-tender [Abdomen Mass (___ Cm)] : no abdominal mass palpated [No CVA Tenderness] : no ~M costovertebral angle tenderness [No Spinal Tenderness] : no spinal tenderness [Involuntary Movements] : no involuntary movements were seen [FreeTextEntry1] : in wheelchair [Skin Color & Pigmentation] : normal skin color and pigmentation [Skin Turgor] : normal skin turgor [] : no rash [Cranial Nerves] : cranial nerves 2-12 were intact [No Focal Deficits] : no focal deficits [Affect] : the affect was normal [Mood] : the mood was normal

## 2024-01-09 NOTE — HISTORY OF PRESENT ILLNESS
[FreeTextEntry1] : Patient is an 89 yo F, HTN, former smoker with L Leg melanoma s/p tibial repair, IBS, PVD, who presents for CKD care  Wearing compression stockings, much improved edema. Somewhat improved urinary fz, still getting up at night but not as often. Will continue Fluid intake about 5 glasses, trying to increase.   Discussed finding ramy, will trial bone conduction headset; Getting out touching animals Will send me the last labs that were done with home lab draw company, will call if any issues on labs otherwise bring labs to next visit.

## 2024-03-01 ENCOUNTER — INPATIENT (INPATIENT)
Facility: HOSPITAL | Age: 89
LOS: 2 days | Discharge: HOME HEALTH | DRG: 393 | End: 2024-03-04
Attending: INTERNAL MEDICINE | Admitting: INTERNAL MEDICINE
Payer: MEDICARE

## 2024-03-01 ENCOUNTER — TRANSCRIPTION ENCOUNTER (OUTPATIENT)
Age: 89
End: 2024-03-01

## 2024-03-01 VITALS
OXYGEN SATURATION: 95 % | SYSTOLIC BLOOD PRESSURE: 167 MMHG | TEMPERATURE: 98 F | DIASTOLIC BLOOD PRESSURE: 77 MMHG | HEART RATE: 99 BPM | RESPIRATION RATE: 18 BRPM | WEIGHT: 139.99 LBS

## 2024-03-01 DIAGNOSIS — I10 ESSENTIAL (PRIMARY) HYPERTENSION: ICD-10-CM

## 2024-03-01 DIAGNOSIS — E78.5 HYPERLIPIDEMIA, UNSPECIFIED: ICD-10-CM

## 2024-03-01 DIAGNOSIS — T18.128A FOOD IN ESOPHAGUS CAUSING OTHER INJURY, INITIAL ENCOUNTER: ICD-10-CM

## 2024-03-01 DIAGNOSIS — Z98.41 CATARACT EXTRACTION STATUS, RIGHT EYE: Chronic | ICD-10-CM

## 2024-03-01 DIAGNOSIS — D64.9 ANEMIA, UNSPECIFIED: ICD-10-CM

## 2024-03-01 DIAGNOSIS — Z29.9 ENCOUNTER FOR PROPHYLACTIC MEASURES, UNSPECIFIED: ICD-10-CM

## 2024-03-01 DIAGNOSIS — N18.9 CHRONIC KIDNEY DISEASE, UNSPECIFIED: ICD-10-CM

## 2024-03-01 DIAGNOSIS — J44.9 CHRONIC OBSTRUCTIVE PULMONARY DISEASE, UNSPECIFIED: ICD-10-CM

## 2024-03-01 LAB
ADD ON TEST-SPECIMEN IN LAB: SIGNIFICANT CHANGE UP
ANION GAP SERPL CALC-SCNC: 13 MMOL/L — SIGNIFICANT CHANGE UP (ref 5–17)
BASOPHILS # BLD AUTO: 0.04 K/UL — SIGNIFICANT CHANGE UP (ref 0–0.2)
BASOPHILS NFR BLD AUTO: 0.5 % — SIGNIFICANT CHANGE UP (ref 0–2)
BUN SERPL-MCNC: 51 MG/DL — HIGH (ref 7–23)
CALCIUM SERPL-MCNC: 10.5 MG/DL — SIGNIFICANT CHANGE UP (ref 8.4–10.5)
CHLORIDE SERPL-SCNC: 100 MMOL/L — SIGNIFICANT CHANGE UP (ref 96–108)
CO2 SERPL-SCNC: 24 MMOL/L — SIGNIFICANT CHANGE UP (ref 22–31)
CREAT SERPL-MCNC: 2.18 MG/DL — HIGH (ref 0.5–1.3)
EGFR: 20 ML/MIN/1.73M2 — LOW
EOSINOPHIL # BLD AUTO: 0.14 K/UL — SIGNIFICANT CHANGE UP (ref 0–0.5)
EOSINOPHIL NFR BLD AUTO: 1.6 % — SIGNIFICANT CHANGE UP (ref 0–6)
GLUCOSE SERPL-MCNC: 142 MG/DL — HIGH (ref 70–99)
HCT VFR BLD CALC: 37 % — SIGNIFICANT CHANGE UP (ref 34.5–45)
HGB BLD-MCNC: 11.8 G/DL — SIGNIFICANT CHANGE UP (ref 11.5–15.5)
IMM GRANULOCYTES NFR BLD AUTO: 0.4 % — SIGNIFICANT CHANGE UP (ref 0–0.9)
LACTATE SERPL-SCNC: 1.6 MMOL/L — SIGNIFICANT CHANGE UP (ref 0.5–2)
LYMPHOCYTES # BLD AUTO: 0.57 K/UL — LOW (ref 1–3.3)
LYMPHOCYTES # BLD AUTO: 6.7 % — LOW (ref 13–44)
MCHC RBC-ENTMCNC: 29.1 PG — SIGNIFICANT CHANGE UP (ref 27–34)
MCHC RBC-ENTMCNC: 31.9 GM/DL — LOW (ref 32–36)
MCV RBC AUTO: 91.1 FL — SIGNIFICANT CHANGE UP (ref 80–100)
MONOCYTES # BLD AUTO: 0.27 K/UL — SIGNIFICANT CHANGE UP (ref 0–0.9)
MONOCYTES NFR BLD AUTO: 3.2 % — SIGNIFICANT CHANGE UP (ref 2–14)
NEUTROPHILS # BLD AUTO: 7.45 K/UL — HIGH (ref 1.8–7.4)
NEUTROPHILS NFR BLD AUTO: 87.6 % — HIGH (ref 43–77)
NRBC # BLD: 0 /100 WBCS — SIGNIFICANT CHANGE UP (ref 0–0)
OB PNL STL: NEGATIVE — SIGNIFICANT CHANGE UP
PLATELET # BLD AUTO: 243 K/UL — SIGNIFICANT CHANGE UP (ref 150–400)
POTASSIUM SERPL-MCNC: 4.2 MMOL/L — SIGNIFICANT CHANGE UP (ref 3.5–5.3)
POTASSIUM SERPL-SCNC: 4.2 MMOL/L — SIGNIFICANT CHANGE UP (ref 3.5–5.3)
RBC # BLD: 4.06 M/UL — SIGNIFICANT CHANGE UP (ref 3.8–5.2)
RBC # FLD: 13.4 % — SIGNIFICANT CHANGE UP (ref 10.3–14.5)
SODIUM SERPL-SCNC: 137 MMOL/L — SIGNIFICANT CHANGE UP (ref 135–145)
WBC # BLD: 8.5 K/UL — SIGNIFICANT CHANGE UP (ref 3.8–10.5)
WBC # FLD AUTO: 8.5 K/UL — SIGNIFICANT CHANGE UP (ref 3.8–10.5)

## 2024-03-01 PROCEDURE — 93010 ELECTROCARDIOGRAM REPORT: CPT

## 2024-03-01 PROCEDURE — 43247 EGD REMOVE FOREIGN BODY: CPT | Mod: GC

## 2024-03-01 PROCEDURE — 99285 EMERGENCY DEPT VISIT HI MDM: CPT

## 2024-03-01 PROCEDURE — 43255 EGD CONTROL BLEEDING ANY: CPT | Mod: GC,59

## 2024-03-01 PROCEDURE — 74176 CT ABD & PELVIS W/O CONTRAST: CPT | Mod: 26,MC

## 2024-03-01 PROCEDURE — 99223 1ST HOSP IP/OBS HIGH 75: CPT | Mod: GC

## 2024-03-01 PROCEDURE — 70490 CT SOFT TISSUE NECK W/O DYE: CPT | Mod: 26,MC

## 2024-03-01 RX ORDER — LIDOCAINE 4 G/100G
1 CREAM TOPICAL ONCE
Refills: 0 | Status: COMPLETED | OUTPATIENT
Start: 2024-03-01 | End: 2024-03-01

## 2024-03-01 RX ORDER — ACETAMINOPHEN 500 MG
1000 TABLET ORAL ONCE
Refills: 0 | Status: COMPLETED | OUTPATIENT
Start: 2024-03-01 | End: 2024-03-01

## 2024-03-01 RX ORDER — SODIUM CHLORIDE 9 MG/ML
1000 INJECTION INTRAMUSCULAR; INTRAVENOUS; SUBCUTANEOUS
Refills: 0 | Status: DISCONTINUED | OUTPATIENT
Start: 2024-03-01 | End: 2024-03-02

## 2024-03-01 RX ORDER — PANTOPRAZOLE SODIUM 20 MG/1
40 TABLET, DELAYED RELEASE ORAL ONCE
Refills: 0 | Status: COMPLETED | OUTPATIENT
Start: 2024-03-01 | End: 2024-03-01

## 2024-03-01 RX ORDER — ACETAMINOPHEN 500 MG
650 TABLET ORAL EVERY 6 HOURS
Refills: 0 | Status: DISCONTINUED | OUTPATIENT
Start: 2024-03-01 | End: 2024-03-04

## 2024-03-01 RX ORDER — SUCRALFATE 1 G
1 TABLET ORAL EVERY 6 HOURS
Refills: 0 | Status: DISCONTINUED | OUTPATIENT
Start: 2024-03-01 | End: 2024-03-04

## 2024-03-01 RX ORDER — PANTOPRAZOLE SODIUM 20 MG/1
8 TABLET, DELAYED RELEASE ORAL
Qty: 80 | Refills: 0 | Status: DISCONTINUED | OUTPATIENT
Start: 2024-03-01 | End: 2024-03-04

## 2024-03-01 RX ORDER — GLUCAGON INJECTION, SOLUTION 0.5 MG/.1ML
1 INJECTION, SOLUTION SUBCUTANEOUS ONCE
Refills: 0 | Status: COMPLETED | OUTPATIENT
Start: 2024-03-01 | End: 2024-03-01

## 2024-03-01 RX ORDER — ONDANSETRON 8 MG/1
4 TABLET, FILM COATED ORAL ONCE
Refills: 0 | Status: COMPLETED | OUTPATIENT
Start: 2024-03-01 | End: 2024-03-01

## 2024-03-01 RX ADMIN — Medication 400 MILLIGRAM(S): at 17:58

## 2024-03-01 RX ADMIN — Medication 1000 MILLIGRAM(S): at 23:26

## 2024-03-01 RX ADMIN — LIDOCAINE 1 PATCH: 4 CREAM TOPICAL at 17:56

## 2024-03-01 RX ADMIN — ONDANSETRON 4 MILLIGRAM(S): 8 TABLET, FILM COATED ORAL at 17:30

## 2024-03-01 RX ADMIN — SODIUM CHLORIDE 125 MILLILITER(S): 9 INJECTION INTRAMUSCULAR; INTRAVENOUS; SUBCUTANEOUS at 17:29

## 2024-03-01 RX ADMIN — Medication 400 MILLIGRAM(S): at 23:24

## 2024-03-01 RX ADMIN — GLUCAGON INJECTION, SOLUTION 1 MILLIGRAM(S): 0.5 INJECTION, SOLUTION SUBCUTANEOUS at 17:57

## 2024-03-01 RX ADMIN — PANTOPRAZOLE SODIUM 40 MILLIGRAM(S): 20 TABLET, DELAYED RELEASE ORAL at 17:30

## 2024-03-01 NOTE — PRE-ANESTHESIA EVALUATION ADULT - NSATTENDATTESTRD_GEN_ALL_CORE
Please add labs in  Epic     Lab appt 02/12/22   The patient has been re-examined and I agree with the above assessment or I updated with my findings.

## 2024-03-01 NOTE — H&P ADULT - PROBLEM SELECTOR PLAN 5
F:   E:  N:  DVT ppx:  GI ppx:    CODE STATUS:  DISPO: Patient on ___    - c/w home meds Patient on ?atorvastatin 10mg qd    - formal med rec when patient more alert  - resume home meds once confirmed Patient on ?atorvastatin 10mg qd    - formal med rec in AM  - resume home meds once confirmed

## 2024-03-01 NOTE — H&P ADULT - ATTENDING COMMENTS
#Food impaction: s/p EGD w/ food bolus removed, also noted were large erosions w/ oozing s/p tree clips and hemostasis achieved. Pt denies any melena. Per GI recs, c/w PPI ggt, carafate, trend hgb, active TS. Monitor for bleeding. Plan for esophogram in am.      #LENCHO on CKD: slightly elevated from baseline, hypovolemic on exam, c/w paintence fluids. Trend cr, f/up urine lytes.      #Compression fx: incidental finding of mild t10 compression fx, likely chronic as pt is asymptomatic, denies any recent falls. MS/sensation intact. Fall precautions. PT eval.

## 2024-03-01 NOTE — ED PROVIDER NOTE - PHYSICAL EXAMINATION
Constitutional: Well appearing, awake, alert, oriented to person, place, time/situation and in no apparent distress.  ENMT: Airway patent. Normal MM. No erythema or exudates in oropharynx. No tongue / lip / uvula / pharyngeal / sublingual edema. No oral lesions. Uvula is midline. No drooling or stridor. Normal phonation.   Eyes: Clear bilaterally  Cardiac: Normal rate, regular rhythm.  Heart sounds S1, S2.  No murmurs, rubs or gallops.  Respiratory: Breaths sounds equal and clear b/l. No increased WOB, tachypnea, hypoxia, or accessory mm use. Pt speaks in full sentences.   Gastrointestinal: Abd soft, NT, ND, NABS. No guarding, rebound, or rigidity. No pulsatile abdominal masses. No organomegaly appreciated. No CVAT   Musculoskeletal: Range of motion is not limited. NO midline spinal. + R lower back paraspinal mm ttp  Neuro: Alert and oriented x 3, face symmetric and speech fluent. Strength 5/5 x 4 ext and symmetric, nml gross motor movement, nml gait. No focal deficits noted.  Skin: Skin normal color for race, warm, dry and intact. No evidence of rash.  Psych: Alert and oriented to person, place, time/situation. normal mood and affect. no apparent risk to self or others. Constitutional: Well appearing, awake, alert, oriented to person, place, time/situation and in no apparent distress.  ENMT: Airway patent. Normal MM. No erythema or exudates in oropharynx. No tongue / lip / uvula / pharyngeal / sublingual edema. No oral lesions. Uvula is midline. No drooling or stridor. Normal phonation.   Eyes: Clear bilaterally  Cardiac: Normal rate, regular rhythm.  Heart sounds S1, S2.  No murmurs, rubs or gallops.  Respiratory: Breaths sounds equal and clear b/l. No increased WOB, tachypnea, hypoxia, or accessory mm use. Pt speaks in full sentences.   Gastrointestinal: Abd soft, NT, ND, NABS. No guarding, rebound, or rigidity. No pulsatile abdominal masses. No organomegaly appreciated. No CVAT. Emesis in bucket appears bloody, but also the appearance of blueberries. Stool color normal, guaiac sent.  Musculoskeletal: Range of motion is not limited. NO midline spinal. + R lower back paraspinal mm ttp  Neuro: Alert and oriented x 3, face symmetric and speech fluent. Strength 5/5 x 4 ext and symmetric, nml gross motor movement, nml gait. No focal deficits noted.  Skin: Skin normal color for race, warm, dry and intact. No evidence of rash.  Psych: Alert and oriented to person, place, time/situation. normal mood and affect. no apparent risk to self or others.

## 2024-03-01 NOTE — CHART NOTE - NSCHARTNOTEFT_GEN_A_CORE
Patient is s/p EGD for food impaction in the OR.     Findings are as follows:   - food bolus of partially chewed chicken and blueberries completely obstructing the esophageal lumen was seen 20 cm from the incisors   - superficial but large erosions that were oozing were seen in the esophagus immediately upon entry before any attempts were made to remove food bolus   - food bolus removed with multiple attempts at suctioning, using a Tripod, a RescueNet, and a rat tooth forceps   - food bolus was eventually completely removed and the scope was able to traverse the GE junction into the stomach   - three clips were placed at friable areas of the esophagus noted to be oozing to achieve hemostasis   - normal stomach  - normal duodenum     Recommendations:   - keep patient NPO except medications   - admit to medicine service   - start pantoprazole gtt for 72 hours   - start Carafate suspension 1g PO QID   - trend Hb and maintain active type and screen   - obtain esophogram tomorrow AM     Case discussed with Dr. Ziegler. GI Team will continue to follow.     Janette Friend D.O.   Gastroenterology Fellow  Weekday 7am-5pm Pager: [phone]  Weeknights/Weekend/Holiday Coverage: Please call the  for contact info

## 2024-03-01 NOTE — H&P ADULT - PROBLEM SELECTOR PLAN 8
F: s/p NS 125cc/hr for 8 hours, now on 100cc/hr for 10 hours  E: replete as needed  N: NPO except meds   DVT ppx: SCD's given bleeding noted on EGD  GI ppx: PPI gtt and carafate    DISPO: [facility]

## 2024-03-01 NOTE — H&P ADULT - PROBLEM SELECTOR PLAN 7
F: NS 125cc/hr   E: replete as needed  N: NPO pending EGD, resume diet as tolerated   DVT ppx: hep subQ  GI ppx: protonix 40mg qd     DISPO: [facility] F: NS 125cc/hr   E: replete as needed  N: NPO except meds   DVT ppx: SCD's  GI ppx: PPI gtt and carafate    DISPO: [facility] F: NS 125cc/hr   E: replete as needed  N: NPO except meds   DVT ppx: SCD's given bleeding noted on EGD  GI ppx: PPI gtt and carafate    DISPO: [facility] Patient a former smoker, not on any meds  On exam noted to have a non-productive cough, but CTAB    - continue to monitor O2 requirements

## 2024-03-01 NOTE — ED PROVIDER NOTE - PROGRESS NOTE DETAILS
GI consented for EGD Called pt's GI, Dr Ruiz, requesting GI fellow to be called. GI fellow requesting trial of Glucagon and carbonated liquids, and CT neck. CT performed w/ confirmed food bolus. GI consented pt for EGD. For EGD tonight

## 2024-03-01 NOTE — ED PROVIDER NOTE - NS ED ROS FT
Constitutional: No fever or chills.   Eyes: No pain, blurry vision, or discharge.  ENMT: See HPI. No neck pain or stiffness.  Cardiac: No chest pain, SOB or edema. No chest pain with exertion.  Respiratory: No cough or respiratory distress. No hemoptysis. No history of asthma or RAD.  GI: No diarrhea or abdominal pain. See HPI  : No dysuria, frequency or burning.  MS: No myalgia, muscle weakness, joint pain or back pain.  Neuro: No headache or weakness. No LOC.  Skin: No skin rash.   Except as documented in the HPI, all other systems are negative.

## 2024-03-01 NOTE — ED PROVIDER NOTE - OBJECTIVE STATEMENT
Pt w/ PMHx HTN, HLD, GERD, COPD, CKD IV, pancreatic insufficiency, melanoma, on ASA 81, prior SDH p/w n/v Pt w/ PMHx HTN, HLD, GERD, COPD, CKD IV, pancreatic insufficiency, melanoma, on ASA 81, prior SDH p/w n/v, feeling something stuck in her throat, points to level of suprasternal notch. Sx started s/p eating boneless chicken, cut small. Sometimes she chokes on food, but has never had impaction or needing procedure to remove it. GI Dr Ruiz. S/p feeling it stuck, she started vomiting and spitting up, w/ continued feeling something is stuck. She denies CP, SOB, abd pain. She is c/o R sided back pain since the sx started, it is worse w/ movement. No F/U/D or hematuria. No hx ureteral colic. No f/c. Pt w/ PMHx HTN, HLD, GERD, COPD, CKD IV, pancreatic insufficiency, melanoma, on ASA 81, prior SDH p/w n/v, feeling something stuck in her throat, points to level of suprasternal notch. Sx started s/p eating boneless chicken for lunch, cut small. Sometimes she chokes on food, but has never had impaction or needing procedure to remove it. GI Dr Ruiz. S/p feeling it stuck, she started vomiting and spitting up, w/ continued feeling something is stuck. She denies CP, SOB, abd pain. She is c/o R sided back pain since the sx started, it is worse w/ movement. No F/U/D or hematuria. No hx ureteral colic. No f/c.  She had blueberries for breakfast

## 2024-03-01 NOTE — H&P ADULT - PROBLEM SELECTOR PLAN 4
Patient with known hx of anemia, likely 2/2 CKD. Follows up with Dr. Toro outpatient, last seen 1/2024, recommended an iron panel and consideration of EPO as needed.     - follow up outpatient with Dr. Toro

## 2024-03-01 NOTE — ED ADULT NURSE NOTE - OBJECTIVE STATEMENT
89yoF PMH HTN, CKD, GERD, melanoma BIBEMS c/o abdominal pain, R flank pain  nausea and vomiting. Pt states that she had a chicken salad sandwich for lunch and since then has had multiple episodes of emesis some bloody. Pt denies diarrhea, fever, chest pain, and SOB. EKG performed. IV placed, lab work sent.

## 2024-03-01 NOTE — H&P ADULT - NSHPSOCIALHISTORY_GEN_ALL_CORE
Patient lives alone Patient lives alone. FLETCHER Ott was unable to provide information regarding patient's medication but states the day [facility] would probably know. Patient herself does not know

## 2024-03-01 NOTE — CONSULT NOTE ADULT - ASSESSMENT
89F with PMH of HTN, HLD, GERD, COPD, CKD IV, pancreatic insufficiency, melanoma, on ASA 81, prior SDH, now p/w n/v after eating chicken and blueberries for lunch.     Recommendations:   - keep patient NPO  - plan for EGD tonight   - f/u final read of CT neck     Case discussed with Dr. Ziegler. GI Team will continue to follow.     Janette Friend D.O.   Gastroenterology Fellow  Weekday 7am-5pm Pager: [phone]  Weeknights/Weekend/Holiday Coverage: Please call the  for contact info

## 2024-03-01 NOTE — H&P ADULT - ASSESSMENT
"Subjective:       Patient ID: James Bedoya is a 25 y.o. male.    Vitals:  height is 5' 3" (1.6 m) and weight is 63.5 kg (140 lb). His tympanic temperature is 97.8 °F (36.6 °C). His blood pressure is 123/84 and his pulse is 104. His respiration is 16 and oxygen saturation is 98%.     Chief Complaint: Sore Throat    Sore Throat    This is a new problem. The current episode started in the past 7 days. The problem has been gradually worsening. The pain is worse on the right side. There has been no fever. The pain is at a severity of 5/10. The pain is mild. Associated symptoms include congestion, swollen glands and trouble swallowing. Pertinent negatives include no coughing, ear pain, shortness of breath, stridor or vomiting. Treatments tried: nyquil  The treatment provided no relief.       Constitution: Negative for chills, sweating, fatigue and fever.   HENT: Positive for congestion, sore throat and trouble swallowing. Negative for ear pain, sinus pain, sinus pressure and voice change.    Neck: Negative for painful lymph nodes.   Eyes: Negative for eye redness.   Respiratory: Negative for chest tightness, cough, sputum production, bloody sputum, COPD, shortness of breath, stridor, wheezing and asthma.    Gastrointestinal: Negative for nausea and vomiting.   Musculoskeletal: Negative for muscle ache.   Skin: Negative for rash.   Allergic/Immunologic: Negative for seasonal allergies and asthma.   Hematologic/Lymphatic: Negative for swollen lymph nodes.       Objective:      Physical Exam   Constitutional: He is oriented to person, place, and time. He appears well-developed and well-nourished. He is cooperative.  Non-toxic appearance. He does not have a sickly appearance. He does not appear ill. No distress.   HENT:   Head: Normocephalic and atraumatic.   Right Ear: Hearing, external ear and ear canal normal. Tympanic membrane is injected. Tympanic membrane is not erythematous.   Left Ear: Hearing, tympanic " membrane, external ear and ear canal normal. Tympanic membrane is not injected and not erythematous.   Nose: Mucosal edema, rhinorrhea and purulent discharge present. No nasal deformity. No epistaxis. Right sinus exhibits maxillary sinus tenderness and frontal sinus tenderness. Left sinus exhibits frontal sinus tenderness. Left sinus exhibits no maxillary sinus tenderness.   Mouth/Throat: Uvula is midline and mucous membranes are normal. No trismus in the jaw. Normal dentition. No uvula swelling. Posterior oropharyngeal erythema present. No oropharyngeal exudate or posterior oropharyngeal edema.   Eyes: Conjunctivae and lids are normal. No scleral icterus.   Neck: Trachea normal, full passive range of motion without pain and phonation normal. Neck supple. No neck rigidity. No edema and no erythema present.   Cardiovascular: Normal rate, regular rhythm, S1 normal, S2 normal, normal heart sounds, intact distal pulses and normal pulses.   No murmur heard.  Pulmonary/Chest: Effort normal and breath sounds normal. No accessory muscle usage. No respiratory distress. He has no decreased breath sounds. He has no wheezes. He has no rhonchi. He has no rales.   Abdominal: Normal appearance.   Musculoskeletal: Normal range of motion. He exhibits no edema or deformity.   Neurological: He is alert and oriented to person, place, and time. He exhibits normal muscle tone. Coordination normal.   Skin: Skin is warm, dry, intact, not diaphoretic and not pale.   Psychiatric: He has a normal mood and affect. His speech is normal and behavior is normal. Judgment and thought content normal. Cognition and memory are normal.   Nursing note and vitals reviewed.        Assessment:       1. Acute bacterial sinusitis        Plan:         Acute bacterial sinusitis  -     amoxicillin-clavulanate 500-125mg (AUGMENTIN) 500-125 mg Tab; Take 1 tablet (500 mg total) by mouth 2 (two) times daily. for 10 days  Dispense: 20 tablet; Refill: 0  -      predniSONE (DELTASONE) 10 MG tablet; Take 1 tablet (10 mg total) by mouth once daily. for 5 doses  Dispense: 5 tablet; Refill: 0    take meds         Patient is a 89 y/o F with pmhx of HTN, HLD, GERD, COPD, CKD 4, pancreatic insufficiency, melanoma, with hx of SDH, who presented to [facility] with complaints of a food impaction, admitted for EGD for disimpaction.

## 2024-03-01 NOTE — H&P ADULT - PROBLEM SELECTOR PLAN 1
Patient presented with sensation of a food impaction in the esophagus following a meal (boneless chicken). CT Neck in the ED confirmed esophageal food bolus. GI consulted in the ED, recommended glucagon and trial of carbonated fluids. Patient admitted and consented for EGD.    - f/u EGD report  - f/u GI recs  - resume diet as tolerated Patient presented with sensation of a food impaction in the esophagus following a meal (boneless chicken). CT Neck in the ED confirmed esophageal food bolus. GI consulted in the ED, recommended glucagon and trial of carbonated fluids. Patient admitted and consented for EGD.    - f/u EGD report  - as per GI, patient was noted to have bleeding in the esophagus prior to intervention, likely due to maceration iso food bolus. A large food bolus consisting of chicken and blueberries was extracted by GI   - NPO except meds for now  - PPI gtt   - carafate suspension   - f/u GI recs Patient presented with sensation of a food impaction in the esophagus following a meal (boneless chicken). CT Neck in the ED confirmed esophageal food bolus. GI consulted in the ED, recommended glucagon and trial of carbonated fluids. Patient admitted and consented for EGD.    - f/u EGD report  - as per GI, patient was noted to have bleeding in the esophagus prior to intervention, likely due to maceration iso food bolus. A large food bolus consisting of chicken and blueberries was extracted by GI   - NPO except meds for now  - PPI gtt   - carafate suspension   - trend Hgb   - f/u GI recs Patient presented with sensation of a food impaction in the esophagus following a meal (boneless chicken). CT Neck in the ED confirmed esophageal food bolus. GI consulted in the ED, recommended glucagon and trial of carbonated fluids. Patient admitted and consented for EGD.    - f/u EGD report  - as per GI, patient was noted to have bleeding in the esophagus prior to intervention, likely due to maceration iso food bolus. A large food bolus consisting of chicken and blueberries was extracted by GI   - NPO except meds for now  - PPI gtt   - carafate suspension   - trend Hgb   - f/u esophagram in AM  - f/u GI recs Patient presented with sensation of a food impaction in the esophagus following a meal (boneless chicken). CT Neck in the ED confirmed esophageal food bolus. GI consulted in the ED, recommended glucagon and trial of carbonated fluids. Patient admitted and consented for EGD.    - f/u EGD report  - as per GI, patient was noted to have bleeding in the esophagus prior to intervention, likely due to maceration iso food bolus. A large food bolus consisting of chicken and blueberries was extracted by GI   - NPO except meds for now  - PPI gtt   - carafate suspension 1g q6 hrs  - trend Hgb, maintain active T&S  - f/u esophagram in AM  - f/u GI recs

## 2024-03-01 NOTE — H&P ADULT - HISTORY OF PRESENT ILLNESS
Patient is a 89 y/o F with pmhx of HTN, HLD, GERD, COPD, CKD 4, pancreatic insufficiency, melanoma, with hx of SDH, who presented to [facility] with complaints of a sensation of food stuck in her throat. Patient stated her sx's started when she was eating boneless chicken. She admits to choking on food periodically but has never had an impaction in the past.   Patient reports that her after feeling the food impaction, she started vomiting in an effort to spit up the food but the sensation remains.     ROS_____     In the ED  VS: T 97.9, HR 99, /77, RR 18, SpO2 95% RA  Labs: BUN 51, Cr 2.18 (baseline ___), Alk phos 129   EKG: NSR, QTc 486  CT Neck: Air-filled distention of the esophagus with a presumed bolus of food in the distal thoracic esophagus extending of the field of view inferiorly. No cervical mass or cervical lymphadenopathy.  CTAP: No acute CT findings. No bowel obstruction or inflammation. No hydronephrosis or obstructive renal calculi. Age indeterminate mild T10 compression fracture. Moderate hiatal hernia. Intraluminal debris in the mid esophagus, likely related to reflux and may predispose the patient to aspiration.  Orders: ofirmev 1g, glucagon 1mg, lidocaine patch, zofran 4mg, protonix 40mg, NS at 125cc/hr (started at 5PM)  Consults: GI - requested trial of Glucagon and carbonated liquids and CT neck. GI consented patient for EGD. Patient is a 89 y/o F with pmhx of HTN, HLD, GERD, COPD (former smoker), CKD 4, pancreatic insufficiency, IBS, melanoma of the L leg s/p tibial repair, with hx of SDH, who presented to [facility] with complaints of a sensation of food stuck in her throat. Patient stated her sx's started when she was eating boneless chicken. She admits to choking on food periodically but has never had an impaction in the past.   Patient reports that her after feeling the food impaction, she started vomiting in an effort to spit up the food but the sensation remains.     ROS_____     In the ED  VS: T 97.9, HR 99, /77, RR 18, SpO2 95% RA  Labs: BUN 51, Cr 2.18 (baseline 1.5-1.7), Alk phos 129   EKG: NSR, QTc 486  CT Neck: Air-filled distention of the esophagus with a presumed bolus of food in the distal thoracic esophagus extending of the field of view inferiorly. No cervical mass or cervical lymphadenopathy.  CTAP: No acute CT findings. No bowel obstruction or inflammation. No hydronephrosis or obstructive renal calculi. Age indeterminate mild T10 compression fracture. Moderate hiatal hernia. Intraluminal debris in the mid esophagus, likely related to reflux and may predispose the patient to aspiration.  Orders: ofirmev 1g, glucagon 1mg, lidocaine patch, zofran 4mg, protonix 40mg, NS at 125cc/hr (started at 5PM)  Consults: GI - requested trial of Glucagon and carbonated liquids and CT neck. GI consented patient for EGD. Patient is a 89 y/o F with pmhx of HTN, HLD, GERD, COPD (former smoker), CKD 4, pancreatic insufficiency, IBS, melanoma of the L leg s/p tibial repair, with hx of SDH, who presented to [facility] with complaints of a sensation of food stuck in her throat. Patient stated her sx's started when she was eating boneless chicken. She admits to choking on food periodically but has never had an impaction in the past.   Patient reports that her after feeling the food impaction, she started vomiting in an effort to spit up the food but the sensation remains.     On ROS patient reports cough and SOB but denies CP, n/v/d, abdominal pain, dysuria, loss of bowel/bladder control, back pain     In the ED  VS: T 97.9, HR 99, /77, RR 18, SpO2 95% RA  Labs: BUN 51, Cr 2.18 (baseline 1.5-1.7), Alk phos 129   EKG: NSR, QTc 486  CT Neck: Air-filled distention of the esophagus with a presumed bolus of food in the distal thoracic esophagus extending of the field of view inferiorly. No cervical mass or cervical lymphadenopathy.  CTAP: No acute CT findings. No bowel obstruction or inflammation. No hydronephrosis or obstructive renal calculi. Age indeterminate mild T10 compression fracture. Moderate hiatal hernia. Intraluminal debris in the mid esophagus, likely related to reflux and may predispose the patient to aspiration.  Orders: ofirmev 1g, glucagon 1mg, lidocaine patch, zofran 4mg, protonix 40mg, NS at 125cc/hr (started at 5PM)  Consults: GI - requested trial of Glucagon and carbonated liquids and CT neck. GI consented patient for EGD.

## 2024-03-01 NOTE — ED ADULT NURSE NOTE - NS PRO PASSIVE SMOKE EXP
FIRST PROVIDER CONTACT ASSESSMENT NOTE      Department of Emergency Medicine   ED  First Provider Note   1/15/21  1:15 PM EST    Chief Complaint: No chief complaint on file. History of Present Illness:    Darien Palacios is a 61 y.o. male who presents to the ED by private car for worsening shortness of breath. Patient recently was diagnosed with lung cancer and has had treatments. Patient did follow-up with Dr. Paul Posadas today and was told that he is fluid on his lungs. Patient had a repeat CAT scan to determine if he responded to the chemotherapy and was found to have \"fluid on his lungs. \"  Patient states his shortness of breath is worsening and he is currently on 2 L of oxygen due to being 83% when coming in. Patient does not wear oxygen at home. Patient is now stating 94%  Focused Screening Exam:  Constitutional:  Alert, appears stated age and is in no distress.       *ALLERGIES*     Zocor [simvastatin - high dose]     ED Triage Vitals   BP Temp Temp src Pulse Resp SpO2 Height Weight   -- 01/15/21 1242 -- 01/15/21 1243 01/15/21 1243 01/15/21 1243 -- --    97.1 °F (36.2 °C)  68 18 (!) 83 %          Initial Plan of Care:  Initiate Treatment-Testing, Proceed toTreatment Area When Bed Available for ED Attending/MLP to Continue Care    -----------------END OF FIRST PROVIDER CONTACT ASSESSMENT NOTE--------------  Electronically signed by Lenny Nicholson PA-C   DD: 1/15/21       Lenny Nicholson PA-C  01/15/21 1238
Unknown

## 2024-03-01 NOTE — H&P ADULT - PROBLEM SELECTOR PLAN 3
Patient on amlodipine 5mg and irbesartan 150mg    - c/w home meds after EGD Patient on ?amlodipine 5mg and irbesartan 150mg    - formal med rec when patient more alert  - resume home meds once confirmed Patient on ?amlodipine 5mg and irbesartan 150mg    - formal med rec when patient more alert  - resume home meds once confirmed  - holding irbesartan 150mg qd iso LENCHO Patient on ?amlodipine 5mg and irbesartan 150mg    - formal med rec in AM  - resume home meds once confirmed  - hold irbesartan 150mg qd iso LENCHO

## 2024-03-01 NOTE — H&P ADULT - NSHPLABSRESULTS_GEN_ALL_CORE
.  LABS:                         11.8   8.50  )-----------( 243      ( 01 Mar 2024 16:40 )             37.0     03-01    137  |  100  |  51<H>  ----------------------------<  142<H>  4.2   |  24  |  2.18<H>    Ca    10.5      01 Mar 2024 16:40    TPro  7.7  /  Alb  4.0  /  TBili  0.3  /  DBili  0.2  /  AST  23  /  ALT  19  /  AlkPhos  129<H>  03-01    Urinalysis Basic - ( 01 Mar 2024 16:40 )    Color: x / Appearance: x / SG: x / pH: x  Gluc: 142 mg/dL / Ketone: x  / Bili: x / Urobili: x   Blood: x / Protein: x / Nitrite: x   Leuk Esterase: x / RBC: x / WBC x   Sq Epi: x / Non Sq Epi: x / Bacteria: x    Lactate, Blood: 1.6 mmol/L (03-01 @ 17:35)    RADIOLOGY, EKG & ADDITIONAL TESTS: Reviewed.

## 2024-03-01 NOTE — ED PROVIDER NOTE - CLINICAL SUMMARY MEDICAL DECISION MAKING FREE TEXT BOX
Pt p/w feeling of food stuck in esophagus, frequent spitting up what appears to be the color of this morning's blue berries. Guaic. Initially tolerated small amount of water, than approx 10 min later spit up again. GI consulted for likely food impaction. Requesting trial of glucagon, soda, and CT neck. Will also do CT a/p given flank pain, although this seems to be more MSK. Hx CKD, non contrast. IVF, antiemetics, PPI. Dispo pending w/u and clinical status

## 2024-03-01 NOTE — H&P ADULT - NSHPPHYSICALEXAM_GEN_ALL_CORE
PHYSICAL EXAM:    Vital Signs Last 24 Hrs  T(C): 36.9 (01 Mar 2024 20:35), Max: 36.9 (01 Mar 2024 18:52)  T(F): 98.4 (01 Mar 2024 18:52), Max: 98.4 (01 Mar 2024 18:52)  HR: 96 (01 Mar 2024 20:35) (96 - 99)  BP: 150/77 (01 Mar 2024 20:35) (150/77 - 167/77)  BP(mean): --  RR: 17 (01 Mar 2024 20:35) (17 - 18)  SpO2: 95% (01 Mar 2024 20:35) (95% - 95%)    Parameters below as of 01 Mar 2024 18:52  Patient On (Oxygen Delivery Method): room air      I&O's Summary      General: NAD, well developed, well nourished, appears stated age  HEENT: NC/AT; EOMI, PERRLA, anicteric sclera; MMM.  Neck: supple, trachea midline  Cardiovascular: RRR, +S1/S2; NO MRG  Respiratory: CTAB; no W/R/R  Gastrointestinal: soft, NTND abdomen; +BSx4  Extremities: WWP; no edema, clubbing, or cyanosis  Vascular: 2+ radial pulses b/l, DP/PT pulses b/l  Neurological: AAOx3; no focal deficits  Dermatological: no rashes, skin intact PHYSICAL EXAM:    Vital Signs Last 24 Hrs  T(C): 36.9 (01 Mar 2024 20:35), Max: 36.9 (01 Mar 2024 18:52)  T(F): 98.4 (01 Mar 2024 18:52), Max: 98.4 (01 Mar 2024 18:52)  HR: 96 (01 Mar 2024 20:35) (96 - 99)  BP: 150/77 (01 Mar 2024 20:35) (150/77 - 167/77)  BP(mean): --  RR: 17 (01 Mar 2024 20:35) (17 - 18)  SpO2: 95% (01 Mar 2024 20:35) (95% - 95%)    Parameters below as of 01 Mar 2024 18:52  Patient On (Oxygen Delivery Method): room air      I&O's Summary      General: NAD but appears uncomfortable, appears stated age  HEENT: NC/AT; MMM  Neck: supple, trachea midline  Cardiovascular: RRR   Respiratory: cough (nonproductive), CTAB  Gastrointestinal: soft, NTND  Extremities: WWP; 1+pitting of b/l LE up to knees   Vascular: 2+ radial pulses b/l   Neurological: AAOx3; no focal deficits PHYSICAL EXAM:    Vital Signs Last 24 Hrs  T(C): 36.9 (01 Mar 2024 20:35), Max: 36.9 (01 Mar 2024 18:52)  T(F): 98.4 (01 Mar 2024 18:52), Max: 98.4 (01 Mar 2024 18:52)  HR: 96 (01 Mar 2024 20:35) (96 - 99)  BP: 150/77 (01 Mar 2024 20:35) (150/77 - 167/77)  BP(mean): --  RR: 17 (01 Mar 2024 20:35) (17 - 18)  SpO2: 95% (01 Mar 2024 20:35) (95% - 95%)    Parameters below as of 01 Mar 2024 18:52  Patient On (Oxygen Delivery Method): room air      I&O's Summary      General: NAD but appears uncomfortable, appears stated age  HEENT: NC/AT; dry MM  Neck: supple, trachea midline  Cardiovascular: RRR   Respiratory: cough (nonproductive), CTAB  Gastrointestinal: soft, NTND  MSK: R-sided rib pain, lidocaine patch overlying site of pain, no bruising noted   Extremities: WWP; 1+pitting of b/l LE up to knees   Vascular: 2+ radial pulses b/l   Neurological: AAOx3, some anterograde amnesia likely 2/2 anesthesia, moving all extremities, sensation intact

## 2024-03-01 NOTE — H&P ADULT - PROBLEM SELECTOR PLAN 2
Patient with CKD 4, baseline Cr 1.5-1.7, follows up with Dr. Toro. Cr on admission 2.18. Etiology includes prerenal iso poor PO intake following esophageal food impaction. Patient started on NS 125cc/hr in the ED    - c/w NS 125cc/hr   - trend Cr Patient with CKD 4, baseline Cr 1.5-1.7, follows up with Dr. Toro. Cr on admission 2.18. Etiology includes prerenal iso poor PO intake following esophageal food impaction, dry MM on exam. Patient started on NS 125cc/hr in the ED    - c/w NS 125cc/hr   - trend Cr Patient with CKD 4, baseline Cr 1.5-1.7, follows up with Dr. Toro. Cr on admission 2.18. Etiology includes prerenal iso poor PO intake following esophageal food impaction, dry MM on exam. Patient started on NS 125cc/hr in the ED    - c/w NS 100cc/hr for 10 hours   - trend Cr

## 2024-03-01 NOTE — H&P ADULT - PROBLEM SELECTOR PLAN 6
Patient a former smoker Patient a former smoker, not on any meds  On exam noted to have a non-productive cough, but CTAB    - continue to monitor O2 requirements Patient with hx of pacreatic insufficiency, as per sure scripts patient is on creon    - formal med rec in AM  - resume home meds once confirmed

## 2024-03-01 NOTE — PRE-ANESTHESIA EVALUATION ADULT - NSANTHPEFT_GEN_ALL_CORE
General: Appearance is consistent with chronological age. No abnormal facies. Frail   EENT: Anicteric sclera; oropharynx clear, moist mucus membranes  Cardiovascular:  Rate and rhythm evaluated  Respiratory: Unlabored breathing  Neurological: Awake and alert, moves all extremities  Constitutional: METs>4

## 2024-03-01 NOTE — PRE-ANESTHESIA EVALUATION ADULT - BP NONINVASIVE SYSTOLIC (MM HG)
Magnesium oxide or citrate 250-500mg at night.      Track with my fitness pal.     30 protein (85 grams), 30 fats, 40 carbs  35 grams if fiber
150

## 2024-03-02 DIAGNOSIS — K86.89 OTHER SPECIFIED DISEASES OF PANCREAS: ICD-10-CM

## 2024-03-02 DIAGNOSIS — R30.0 DYSURIA: ICD-10-CM

## 2024-03-02 LAB
A1C WITH ESTIMATED AVERAGE GLUCOSE RESULT: 5.6 % — SIGNIFICANT CHANGE UP (ref 4–5.6)
ALBUMIN SERPL ELPH-MCNC: 3.4 G/DL — SIGNIFICANT CHANGE UP (ref 3.3–5)
ALP SERPL-CCNC: 102 U/L — SIGNIFICANT CHANGE UP (ref 40–120)
ALT FLD-CCNC: 15 U/L — SIGNIFICANT CHANGE UP (ref 10–45)
ANION GAP SERPL CALC-SCNC: 8 MMOL/L — SIGNIFICANT CHANGE UP (ref 5–17)
ANION GAP SERPL CALC-SCNC: 8 MMOL/L — SIGNIFICANT CHANGE UP (ref 5–17)
APPEARANCE UR: ABNORMAL
AST SERPL-CCNC: 17 U/L — SIGNIFICANT CHANGE UP (ref 10–40)
BACTERIA # UR AUTO: ABNORMAL /HPF
BASOPHILS # BLD AUTO: 0.05 K/UL — SIGNIFICANT CHANGE UP (ref 0–0.2)
BASOPHILS NFR BLD AUTO: 0.9 % — SIGNIFICANT CHANGE UP (ref 0–2)
BILIRUB SERPL-MCNC: 0.3 MG/DL — SIGNIFICANT CHANGE UP (ref 0.2–1.2)
BILIRUB UR-MCNC: NEGATIVE — SIGNIFICANT CHANGE UP
BLD GP AB SCN SERPL QL: NEGATIVE — SIGNIFICANT CHANGE UP
BUN SERPL-MCNC: 55 MG/DL — HIGH (ref 7–23)
BUN SERPL-MCNC: 56 MG/DL — HIGH (ref 7–23)
CALCIUM SERPL-MCNC: 9.3 MG/DL — SIGNIFICANT CHANGE UP (ref 8.4–10.5)
CALCIUM SERPL-MCNC: 9.5 MG/DL — SIGNIFICANT CHANGE UP (ref 8.4–10.5)
CAST: 6 /LPF — HIGH (ref 0–4)
CHLORIDE SERPL-SCNC: 103 MMOL/L — SIGNIFICANT CHANGE UP (ref 96–108)
CHLORIDE SERPL-SCNC: 104 MMOL/L — SIGNIFICANT CHANGE UP (ref 96–108)
CHOLEST SERPL-MCNC: 151 MG/DL — SIGNIFICANT CHANGE UP
CO2 SERPL-SCNC: 22 MMOL/L — SIGNIFICANT CHANGE UP (ref 22–31)
CO2 SERPL-SCNC: 24 MMOL/L — SIGNIFICANT CHANGE UP (ref 22–31)
COLOR SPEC: YELLOW — SIGNIFICANT CHANGE UP
CREAT SERPL-MCNC: 2.18 MG/DL — HIGH (ref 0.5–1.3)
CREAT SERPL-MCNC: 2.2 MG/DL — HIGH (ref 0.5–1.3)
DIFF PNL FLD: NEGATIVE — SIGNIFICANT CHANGE UP
EGFR: 20 ML/MIN/1.73M2 — LOW
EGFR: 20 ML/MIN/1.73M2 — LOW
EOSINOPHIL # BLD AUTO: 0.05 K/UL — SIGNIFICANT CHANGE UP (ref 0–0.5)
EOSINOPHIL NFR BLD AUTO: 0.9 % — SIGNIFICANT CHANGE UP (ref 0–6)
ESTIMATED AVERAGE GLUCOSE: 114 MG/DL — SIGNIFICANT CHANGE UP (ref 68–114)
FERRITIN SERPL-MCNC: 62 NG/ML — SIGNIFICANT CHANGE UP (ref 13–330)
GLUCOSE BLDC GLUCOMTR-MCNC: 100 MG/DL — HIGH (ref 70–99)
GLUCOSE BLDC GLUCOMTR-MCNC: 121 MG/DL — HIGH (ref 70–99)
GLUCOSE BLDC GLUCOMTR-MCNC: 130 MG/DL — HIGH (ref 70–99)
GLUCOSE SERPL-MCNC: 132 MG/DL — HIGH (ref 70–99)
GLUCOSE SERPL-MCNC: 150 MG/DL — HIGH (ref 70–99)
GLUCOSE UR QL: 100 MG/DL
HCT VFR BLD CALC: 28.5 % — LOW (ref 34.5–45)
HCT VFR BLD CALC: 30.8 % — LOW (ref 34.5–45)
HDLC SERPL-MCNC: 78 MG/DL — SIGNIFICANT CHANGE UP
HGB BLD-MCNC: 9.3 G/DL — LOW (ref 11.5–15.5)
HGB BLD-MCNC: 9.7 G/DL — LOW (ref 11.5–15.5)
IRON SATN MFR SERPL: 19 UG/DL — LOW (ref 30–160)
IRON SATN MFR SERPL: 9 % — LOW (ref 14–50)
KETONES UR-MCNC: NEGATIVE MG/DL — SIGNIFICANT CHANGE UP
LEUKOCYTE ESTERASE UR-ACNC: ABNORMAL
LIPID PNL WITH DIRECT LDL SERPL: 66 MG/DL — SIGNIFICANT CHANGE UP
LYMPHOCYTES # BLD AUTO: 0.45 K/UL — LOW (ref 1–3.3)
LYMPHOCYTES # BLD AUTO: 8.8 % — LOW (ref 13–44)
MAGNESIUM SERPL-MCNC: 2.1 MG/DL — SIGNIFICANT CHANGE UP (ref 1.6–2.6)
MANUAL SMEAR VERIFICATION: SIGNIFICANT CHANGE UP
MCHC RBC-ENTMCNC: 28.7 PG — SIGNIFICANT CHANGE UP (ref 27–34)
MCHC RBC-ENTMCNC: 29.2 PG — SIGNIFICANT CHANGE UP (ref 27–34)
MCHC RBC-ENTMCNC: 31.5 GM/DL — LOW (ref 32–36)
MCHC RBC-ENTMCNC: 32.6 GM/DL — SIGNIFICANT CHANGE UP (ref 32–36)
MCV RBC AUTO: 89.6 FL — SIGNIFICANT CHANGE UP (ref 80–100)
MCV RBC AUTO: 91.1 FL — SIGNIFICANT CHANGE UP (ref 80–100)
MONOCYTES # BLD AUTO: 0.09 K/UL — SIGNIFICANT CHANGE UP (ref 0–0.9)
MONOCYTES NFR BLD AUTO: 1.7 % — LOW (ref 2–14)
NEUTROPHILS # BLD AUTO: 4.46 K/UL — SIGNIFICANT CHANGE UP (ref 1.8–7.4)
NEUTROPHILS NFR BLD AUTO: 86 % — HIGH (ref 43–77)
NEUTS BAND # BLD: 1.7 % — SIGNIFICANT CHANGE UP (ref 0–8)
NITRITE UR-MCNC: NEGATIVE — SIGNIFICANT CHANGE UP
NON HDL CHOLESTEROL: 73 MG/DL — SIGNIFICANT CHANGE UP
NRBC # BLD: 0 /100 WBCS — SIGNIFICANT CHANGE UP (ref 0–0)
OVALOCYTES BLD QL SMEAR: SLIGHT — SIGNIFICANT CHANGE UP
PH UR: >=9 (ref 5–8)
PHOSPHATE SERPL-MCNC: 4.2 MG/DL — SIGNIFICANT CHANGE UP (ref 2.5–4.5)
PLAT MORPH BLD: NORMAL — SIGNIFICANT CHANGE UP
PLATELET # BLD AUTO: 176 K/UL — SIGNIFICANT CHANGE UP (ref 150–400)
PLATELET # BLD AUTO: 190 K/UL — SIGNIFICANT CHANGE UP (ref 150–400)
POIKILOCYTOSIS BLD QL AUTO: SLIGHT — SIGNIFICANT CHANGE UP
POTASSIUM SERPL-MCNC: 4.9 MMOL/L — SIGNIFICANT CHANGE UP (ref 3.5–5.3)
POTASSIUM SERPL-MCNC: 5.1 MMOL/L — SIGNIFICANT CHANGE UP (ref 3.5–5.3)
POTASSIUM SERPL-SCNC: 4.9 MMOL/L — SIGNIFICANT CHANGE UP (ref 3.5–5.3)
POTASSIUM SERPL-SCNC: 5.1 MMOL/L — SIGNIFICANT CHANGE UP (ref 3.5–5.3)
PROT SERPL-MCNC: 6.8 G/DL — SIGNIFICANT CHANGE UP (ref 6–8.3)
PROT UR-MCNC: >=1000 MG/DL
RBC # BLD: 3.18 M/UL — LOW (ref 3.8–5.2)
RBC # BLD: 3.38 M/UL — LOW (ref 3.8–5.2)
RBC # FLD: 13.7 % — SIGNIFICANT CHANGE UP (ref 10.3–14.5)
RBC # FLD: 13.7 % — SIGNIFICANT CHANGE UP (ref 10.3–14.5)
RBC BLD AUTO: ABNORMAL
RBC CASTS # UR COMP ASSIST: 2 /HPF — SIGNIFICANT CHANGE UP (ref 0–4)
RH IG SCN BLD-IMP: POSITIVE — SIGNIFICANT CHANGE UP
SODIUM SERPL-SCNC: 134 MMOL/L — LOW (ref 135–145)
SODIUM SERPL-SCNC: 135 MMOL/L — SIGNIFICANT CHANGE UP (ref 135–145)
SP GR SPEC: 1.02 — SIGNIFICANT CHANGE UP (ref 1–1.03)
SQUAMOUS # UR AUTO: 1 /HPF — SIGNIFICANT CHANGE UP (ref 0–5)
TIBC SERPL-MCNC: 223 UG/DL — SIGNIFICANT CHANGE UP (ref 220–430)
TRANSFERRIN SERPL-MCNC: 192 MG/DL — LOW (ref 200–360)
TRI-PHOS CRY UR QL COMP ASSIST: PRESENT
TRIGL SERPL-MCNC: 37 MG/DL — SIGNIFICANT CHANGE UP
UIBC SERPL-MCNC: 204 UG/DL — SIGNIFICANT CHANGE UP (ref 110–370)
UROBILINOGEN FLD QL: 1 MG/DL — SIGNIFICANT CHANGE UP (ref 0.2–1)
WBC # BLD: 4.91 K/UL — SIGNIFICANT CHANGE UP (ref 3.8–10.5)
WBC # BLD: 5.09 K/UL — SIGNIFICANT CHANGE UP (ref 3.8–10.5)
WBC # FLD AUTO: 4.91 K/UL — SIGNIFICANT CHANGE UP (ref 3.8–10.5)
WBC # FLD AUTO: 5.09 K/UL — SIGNIFICANT CHANGE UP (ref 3.8–10.5)
WBC UR QL: 0 /HPF — SIGNIFICANT CHANGE UP (ref 0–5)

## 2024-03-02 PROCEDURE — 99233 SBSQ HOSP IP/OBS HIGH 50: CPT | Mod: GC

## 2024-03-02 PROCEDURE — 99232 SBSQ HOSP IP/OBS MODERATE 35: CPT | Mod: GC

## 2024-03-02 RX ORDER — ZOLPIDEM TARTRATE 10 MG/1
1 TABLET ORAL
Qty: 0 | Refills: 0 | DISCHARGE

## 2024-03-02 RX ORDER — SODIUM CHLORIDE 9 MG/ML
1000 INJECTION INTRAMUSCULAR; INTRAVENOUS; SUBCUTANEOUS
Refills: 0 | Status: DISCONTINUED | OUTPATIENT
Start: 2024-03-02 | End: 2024-03-04

## 2024-03-02 RX ORDER — LANOLIN ALCOHOL/MO/W.PET/CERES
5 CREAM (GRAM) TOPICAL AT BEDTIME
Refills: 0 | Status: DISCONTINUED | OUTPATIENT
Start: 2024-03-02 | End: 2024-03-04

## 2024-03-02 RX ORDER — ACETAMINOPHEN 500 MG
1000 TABLET ORAL ONCE
Refills: 0 | Status: COMPLETED | OUTPATIENT
Start: 2024-03-02 | End: 2024-03-02

## 2024-03-02 RX ORDER — BENZOCAINE 10 %
1 GEL (GRAM) MUCOUS MEMBRANE THREE TIMES A DAY
Refills: 0 | Status: DISCONTINUED | OUTPATIENT
Start: 2024-03-02 | End: 2024-03-04

## 2024-03-02 RX ORDER — CEFTRIAXONE 500 MG/1
1000 INJECTION, POWDER, FOR SOLUTION INTRAMUSCULAR; INTRAVENOUS EVERY 24 HOURS
Refills: 0 | Status: COMPLETED | OUTPATIENT
Start: 2024-03-02 | End: 2024-03-04

## 2024-03-02 RX ADMIN — Medication 650 MILLIGRAM(S): at 08:46

## 2024-03-02 RX ADMIN — Medication 1 GRAM(S): at 08:46

## 2024-03-02 RX ADMIN — Medication 1000 MILLIGRAM(S): at 16:42

## 2024-03-02 RX ADMIN — PANTOPRAZOLE SODIUM 10 MG/HR: 20 TABLET, DELAYED RELEASE ORAL at 01:24

## 2024-03-02 RX ADMIN — Medication 650 MILLIGRAM(S): at 09:46

## 2024-03-02 RX ADMIN — Medication 1 DROP(S): at 21:58

## 2024-03-02 RX ADMIN — Medication 1 SPRAY(S): at 01:47

## 2024-03-02 RX ADMIN — Medication 1 SPRAY(S): at 21:58

## 2024-03-02 RX ADMIN — Medication 1 GRAM(S): at 15:01

## 2024-03-02 RX ADMIN — PANTOPRAZOLE SODIUM 10 MG/HR: 20 TABLET, DELAYED RELEASE ORAL at 10:45

## 2024-03-02 RX ADMIN — Medication 400 MILLIGRAM(S): at 16:27

## 2024-03-02 RX ADMIN — Medication 1 DROP(S): at 13:09

## 2024-03-02 RX ADMIN — CEFTRIAXONE 100 MILLIGRAM(S): 500 INJECTION, POWDER, FOR SOLUTION INTRAMUSCULAR; INTRAVENOUS at 18:36

## 2024-03-02 RX ADMIN — SODIUM CHLORIDE 100 MILLILITER(S): 9 INJECTION INTRAMUSCULAR; INTRAVENOUS; SUBCUTANEOUS at 01:37

## 2024-03-02 RX ADMIN — Medication 650 MILLIGRAM(S): at 22:31

## 2024-03-02 RX ADMIN — LIDOCAINE 1 PATCH: 4 CREAM TOPICAL at 05:45

## 2024-03-02 RX ADMIN — Medication 1 GRAM(S): at 21:57

## 2024-03-02 RX ADMIN — Medication 1 SPRAY(S): at 13:09

## 2024-03-02 RX ADMIN — Medication 1 GRAM(S): at 01:04

## 2024-03-02 RX ADMIN — Medication 5 MILLIGRAM(S): at 23:52

## 2024-03-02 RX ADMIN — Medication 5 MILLIGRAM(S): at 02:11

## 2024-03-02 RX ADMIN — Medication 650 MILLIGRAM(S): at 22:01

## 2024-03-02 NOTE — PROGRESS NOTE ADULT - PROBLEM SELECTOR PLAN 8
F: s/p NS 125cc/hr for 8 hours, now on 100cc/hr for 10 hours  E: replete as needed  N: NPO except meds   DVT ppx: SCD's given bleeding noted on EGD  GI ppx: PPI gtt and carafate    DISPO: [facility] Patient a former smoker, not on any meds  On exam noted to have a non-productive cough, but CTAB    - continue to monitor O2 requirements

## 2024-03-02 NOTE — PROGRESS NOTE ADULT - PROBLEM SELECTOR PLAN 3
Patient on ?amlodipine 5mg and irbesartan 150mg    - formal med rec in AM  - resume home meds once confirmed  - hold irbesartan 150mg qd iso LENCHO reported by pt am 3/2    - follow up ua w reflex culture UA+, Cx pending but pt w prior cx +proteus, CTX-sensitive  per radiology, ct ap non-con is comprable to ct stone-specific studies, therefore no concern for renal calculi given negative ct ap 3/1    - follow up UCx & sensitivities  - continue cxt 1g qd x3 days for now, adjust as-needed

## 2024-03-02 NOTE — PATIENT PROFILE ADULT - FUNCTIONAL ASSESSMENT - BASIC MOBILITY 6.
2-calculated by average/Not able to assess (calculate score using [facility] averaging method)

## 2024-03-02 NOTE — PROGRESS NOTE ADULT - PROBLEM SELECTOR PLAN 6
Patient with hx of pacreatic insufficiency, as per sure scripts patient is on creon    - formal med rec in AM  - resume home meds once confirmed Patient on ?atorvastatin 10mg qd    - follow up formal medication reconciliation, resume medications once confirmed/as-appropriate

## 2024-03-02 NOTE — PHYSICAL THERAPY INITIAL EVALUATION ADULT - GAIT DEVIATIONS NOTED, PT EVAL
decreased yajaira/increased time in double stance/decreased velocity of limb motion/decreased step length/decreased stride length/decreased weight-shifting ability

## 2024-03-02 NOTE — PROGRESS NOTE ADULT - PROBLEM SELECTOR PLAN 2
Patient with CKD 4, baseline Cr 1.5-1.7, follows up with Dr. Toro. Cr on admission 2.18. Etiology includes prerenal iso poor PO intake following esophageal food impaction, dry MM on exam. Patient started on NS 125cc/hr in the ED    - c/w NS 100cc/hr for 10 hours   - trend Cr Patient with CKD 4, baseline Cr 1.5-1.7, follows up with Dr. Toro. Cr on admission 2.18. Etiology includes prerenal iso poor PO intake following esophageal food impaction, dry MM on exam. Patient started on NS 125cc/hr in the ED    - continue NS 100cc/hr for 10 hours   - trend Cr

## 2024-03-02 NOTE — PHYSICAL THERAPY INITIAL EVALUATION ADULT - GENERAL OBSERVATIONS, REHAB EVAL
PT IE completed. Patient received semi supine in bed +IV, +HHA (Aziza) present at bedside, patient NAD, willing to work with PT.

## 2024-03-02 NOTE — PROGRESS NOTE ADULT - PROBLEM SELECTOR PLAN 1
Patient presented with sensation of a food impaction in the esophagus following a meal (boneless chicken). CT Neck in the ED confirmed esophageal food bolus. GI consulted in the ED, recommended glucagon and trial of carbonated fluids. Patient admitted and consented for EGD.    - f/u EGD report  - as per GI, patient was noted to have bleeding in the esophagus prior to intervention, likely due to maceration iso food bolus. A large food bolus consisting of chicken and blueberries was extracted by GI   - NPO except meds for now  - PPI gtt   - carafate suspension 1g q6 hrs  - trend Hgb, maintain active T&S  - f/u esophagram in AM  - f/u GI recs GI following, recommendations appreciated   3/1 pt underwent EGD following failed trial of glucagon & carbonated beverages  per GI, patient was noted to have bleeding in the esophagus prior to intervention, likely due to maceration iso food bolus, during egd large food bolus consisting of chicken and blueberries was extracted    - clear liquid diet for now  - continue to trend hgb given esophagel bleeding  - continue PPI gtt x 72h (started 3/1)  - carafate suspension 1g q6 hrs  - trend Hgb, maintain active T&S  - follow up esophagram (likely monday, 3/4)

## 2024-03-02 NOTE — PROGRESS NOTE ADULT - PROBLEM SELECTOR PLAN 5
Patient on ?atorvastatin 10mg qd    - formal med rec in AM  - resume home meds once confirmed Patient with known hx of anemia, likely 2/2 CKD. Follows up with Dr. Toro outpatient, last seen 1/2024, recommended an iron panel and consideration of EPO as needed.     - follow up outpatient with Dr. Toro

## 2024-03-02 NOTE — PROGRESS NOTE ADULT - PROBLEM SELECTOR PLAN 4
Patient with known hx of anemia, likely 2/2 CKD. Follows up with Dr. Toro outpatient, last seen 1/2024, recommended an iron panel and consideration of EPO as needed.     - follow up outpatient with Dr. Toro Patient on ?amlodipine 5mg and irbesartan 150mg    - follow up formal medication reconciliation, resume medications once confirmed/as-appropriate  - hold irbesartan 150mg qd iso LENCHO, resume as-appropriate

## 2024-03-02 NOTE — CONSULT NOTE ADULT - ASSESSMENT
Internal Medicine Teaching Service (IMTS)  Discharge Summary   Patient: Jude Johnson Discharge Date: 10/2/2023 [facility] Hospital: [facility]   YOB: 1995 Admission Date: 9/28/2023 [facility] Senior: Yohana Zaidi   MRN: [id number] Admission Length: 0 Days    Admitting Physician: No att. providers found Discharge Physician: No att. providers found IMTS Team Color: BLUE     Admission Information     Admission Diagnoses:   Alcohol withdrawal seizure, uncomplicated (CMD) [F10.930, R56.9] Primary Discharge Diagnoses:     # severe alcohol use disorder with withdrawal Secondary Discharge Diagnoses:   Patient Active Problem List   Diagnosis   • Alcohol abuse   • Alcohol withdrawal seizure, uncomplicated (CMD)        LACE Score:   LACE(5-9):Moderate  Total Score: 7           3 LACE Length of Stay    3 LACE Acute Admission    1 LACE Visits to ED Previous 6 Months        Criteria that do not apply:    LACE Charlson Comorbidity Index Evalution        30 day Readmission: No   Admission Condition: Stable    Discharged Condition: Stable    Disposition: Home     Hospital Course     Jude Johnson is a 28 year old male with a PMH significant for alcohol use disorder and anxiety who was admitted on 9/28/2023 with alcohol withdrawal seizure.     Patient states he typically consumes 1L vodka per day although the past week had cut back to 6-10 beers per day. Last drink was earlier on day of admission (9/28/23). He endorsed multiple seizures the days prior that were witnessed by family. Seizures date back to 2021 although they have been more frequent recently. He also endorsed tremors of the hands, lucid dreams, diaphoresis, and pupiillary dilation. Denied auditory or visual hallucinations. Stated he feels dizzy after seizures and has bad headaches. Endorsed nausea and vomiting with alcohol use, denied abdominal pain. He also noted insomnia the past 3-4 days due to his withdrawal. He takes gabapentin, librium, and  valium.     In the ED, vitals were /57, P 121, R 16, T 97.5, SpO2 91. Labs were notable for Na 134, Bicarb 11, anion gap 34, , , alcohol ethyl 13. Patient was given ativan injection 2mg once, Zofran 4mg, as well as 1L normal saline bolus.     Patient was admitted to Medicine service for further management. He was placed on CIWA protocol with seizure precautions. Given scheduled gabapentin and Librium, with Ativan given per protocol.     Patient was discharged in stable condition with 2 day supply of Librium.    Note to PCP:   -would recommend repeating CMP in 1 week to evaluate LFTs   -would recommend following up on gallbladder polyp, most recently back in July 2023 was 5 mm   -please follow-up on patient's alcohol cessation    Diagnostic studies:  No results found.    Consultants:      Physical Exam     Visit Vitals  /87 (BP Location: LUE - Left upper extremity)   Pulse (!) 57   Temp 97.9 °F (36.6 °C) (Oral)   Resp (!) 20   Ht 5' 11\" (1.803 m)   Wt 80.2 kg (176 lb 14.4 oz)   SpO2 100%   BMI 24.67 kg/m²     Wt Readings from Last 1 Encounters:   09/28/23 80.2 kg (176 lb 14.4 oz)        Gen: NAD, comfortable appearing male  HEENT: PERRL, anicteric, no conjunctival pallor, no nasal discharge  CVS:  RRR, no M/R/G, S1/S2 normal, no JVD, no B/L LE edema  Pulm: CTAB, no W/C/R, no retractions or increased work of breathing  GI: +BS, soft, NT/ND  MSK: ROM normal throughout, strength 5/5 in all extremities  Skin: No rashes, lesions, ecchymoses or jaundice, warm, dry  Neuro: A/Ox3, no gross motor or sensory deficits, no facial droop or dysarthria  Psych: Appropriate mood/affect    Recommendations - Instructions - Follow-up   Special Recommendations (DVT hx/ INR range/ Stent hx/ Anti-coagulation/ Coagulopathies/ Bleeding Disorder hx/ etc.):    NA    Diet:   Activity:  Activity as Tolerated Wound Care: None Needed     Unresulted:  Unresulted Labs (From admission, onward)     Start     Ordered     10/01/23 0353  Extra Tubes  Once Routine,   Routine         10/01/23 0352    10/01/23 0353  Lavender Top  PROCEDURE ONCE,   Routine         10/01/23 0352    10/01/23 0353  Gold Top  PROCEDURE ONCE,   Routine         10/01/23 0352    09/29/23 0600  Comprehensive Metabolic Panel  AM DRAW,   Routine       09/28/23 1335    09/29/23 0600  Prothrombin Time (INR/PT)  AM DRAW,   Routine       09/28/23 1335    09/28/23 1336  Magnesium  AM DRAW,   Routine       09/28/23 1335    09/28/23 1336  Phosphorus  AM DRAW,   Routine       09/28/23 1335              Unresulted Procedure (From admission, onward)    None          Follow-up Providers/Appointments:  Janell Elder APNP  [address] [facility] [address]  [phone]    Follow up  make appt for follow up in 1 week      Medications        Summary of your Discharge Medications      Take these Medications      Details   chlordiazePOXIDE 25 MG capsule  Commonly known as: LIBRIUM   Take 1 capsule by mouth in the morning and 1 capsule in the evening.     gabapentin 600 MG tablet  Commonly known as: NEURONTIN   Take 600 mg by mouth in the morning and 600 mg at noon and 600 mg in the evening.     magnesium oxide 400 MG tablet  Commonly known as: MAG-OX   Take 400 mg by mouth daily.     ondansetron 4 MG disintegrating tablet  Commonly known as: ZOFRAN ODT   Place 1 tablet onto the tongue every 6 hours.            CMS Best Practices - MI - HF - STROKE - PNA   NA  Heart failure? No  Stroke measures indicated? no  AMI? NO  Pneumonia? NO  ____________________________  Yohana Zaidi MD  Internal Medicine, PGY-III  10/1/2023 3:52 PM  Pager [phone]    This patient was discussed with attending physician Dr. Trammell. Please see below or additional addendum note for any further details.     Attending Addendum:     I have seen, examined and discussed patient in detail with Resident.  I have reviewed medical charts, labs and images.  I agree with the resident's findings,  assessment and plan.    Sola Trammell MD  10/1/2023                  I M     89 y o F with pmhx of HTN, HLD, GERD, COPD, CKD 4, pancreatic insufficiency, melanoma, with hx of SDH, who presented to [facility] with complaints of a food impaction, admitted for EGD for disimpaction.     Problem/Plan - 1:  ·  Problem: Food impaction of esophagus.   ·  Plan: Patient presented with sensation of a food impaction in the esophagus following a meal (boneless chicken). CT Neck in the ED confirmed esophageal food bolus. GI consulted in the ED, recommended glucagon and trial of carbonated fluids. Patient admitted and consented for EGD.    - f/u EGD report  - as per GI, patient was noted to have bleeding in the esophagus prior to intervention, likely due to maceration iso food bolus. A large food bolus consisting of chicken and blueberries was extracted by GI   - NPO except meds for now  - PPI gtt   - carafate suspension 1g q6 hrs  - trend Hgb, maintain active T&S  - f/u esophagram in AM  - f/u GI recs.    Problem/Plan - 2:  ·  Problem: Acute kidney injury superimposed on CKD.   ·  Plan: Patient with CKD 4, baseline Cr 1.5-1.7, follows up with Dr. Toro. Cr on admission 2.18. Etiology includes prerenal iso poor PO intake following esophageal food impaction, dry MM on exam. Patient started on NS 125cc/hr in the ED    - c/w NS 100cc/hr for 10 hours   - trend Cr.    Problem/Plan - 3:  ·  Problem: Hypertension.   ·  Plan: Patient on ?amlodipine 5mg and irbesartan 150mg    - formal med rec in AM  - resume home meds once confirmed  - hold irbesartan 150mg qd iso LENCHO.    Problem/Plan - 4:  ·  Problem: Anemia.   ·  Plan: Patient with known hx of anemia, likely 2/2 CKD. Follows up with Dr. Toro outpatient, last seen 1/2024, recommended an iron panel and consideration of EPO as needed.     - follow up outpatient with Dr. Toro.    Problem/Plan - 5:  ·  Problem: HLD (hyperlipidemia).   ·  Plan: Patient on ?atorvastatin 10mg qd    - formal med rec in AM  - resume home meds once confirmed.    Problem/Plan - 6:  ·  Problem: Pancreatic insufficiency.   ·  Plan: Patient with hx of pacreatic insufficiency, as per sure scripts patient is on creon    - formal med rec in AM  - resume home meds once confirmed.    Problem/Plan - 7:  ·  Problem: COPD (chronic obstructive pulmonary disease).   ·  Plan: Patient a former smoker, not on any meds  On exam noted to have a non-productive cough, but CTAB    - continue to monitor O2 requirements.    Problem/Plan - 8:  ·  Problem: Prophylactic measure.   ·  Plan: F: s/p NS 125cc/hr for 8 hours, now on 100cc/hr for 10 hours  E: replete as needed  N: NPO except meds   DVT ppx: SCD's given bleeding noted on EGD  GI ppx: PPI gtt and carafate    DISPO: RMF.

## 2024-03-02 NOTE — PROGRESS NOTE ADULT - SUBJECTIVE AND OBJECTIVE BOX
GI Consult Progress Note:     OVERNIGHT EVENTS: SAVAGE.    SUBJECTIVE / INTERVAL HPI:   Patient seen and examined at bedside. States that overall she feels well. Complaining of some pain with urination. Discussed findings of EGD yesterday and plan for puree diet moving forward.       VITAL SIGNS:  Vital Signs Last 24 Hrs  T(C): 36.8 (02 Mar 2024 14:35), Max: 36.9 (01 Mar 2024 18:52)  T(F): 98.3 (02 Mar 2024 14:35), Max: 98.4 (01 Mar 2024 18:52)  HR: 86 (02 Mar 2024 14:35) (75 - 96)  BP: 139/77 (02 Mar 2024 14:35) (129/68 - 163/76)  BP(mean): 94 (01 Mar 2024 23:38) (94 - 112)  RR: 17 (02 Mar 2024 14:35) (16 - 18)  SpO2: 93% (02 Mar 2024 14:35) (93% - 95%)    Parameters below as of 02 Mar 2024 14:35  Patient On (Oxygen Delivery Method): room air        24 @ 07:01  -  24 @ 07:00  --------------------------------------------------------  IN: 1245 mL / OUT: 0 mL / NET: 1245 mL    24 @ 07:01  -  24 @ 18:31  --------------------------------------------------------  IN: 600 mL / OUT: 0 mL / NET: 600 mL      PHYSICAL EXAM:  General: No acute distress, thin, frail, elderly, hard of hearing   Lungs: Normal respiratory effort and no intercostal retractions  Cardiovascular: RRR  Abdomen: Soft, non-tender, non-distended  Neurological: Alert and oriented x3  Skin: Warm and dry. No obvious rash      MEDICATIONS:  MEDICATIONS  (STANDING):  artificial  tears Solution 1 Drop(s) Both EYES three times a day  benzocaine 20% Spray 1 Spray(s) Topical three times a day  cefTRIAXone   IVPB 1000 milliGRAM(s) IV Intermittent every 24 hours  melatonin 5 milliGRAM(s) Oral at bedtime  pantoprazole Infusion 8 mG/Hr (10 mL/Hr) IV Continuous <Continuous>  sodium chloride 0.9%. 1000 milliLiter(s) (100 mL/Hr) IV Continuous <Continuous>  sucralfate suspension 1 Gram(s) Oral every 6 hours    MEDICATIONS  (PRN):  acetaminophen     Tablet .. 650 milliGRAM(s) Oral every 6 hours PRN Temp greater or equal to 38C (100.4F), Mild Pain (1 - 3)      ALLERGIES:  Allergies    No Known Allergies    Intolerances        LABS:                        9.7    4.91  )-----------( 190      ( 02 Mar 2024 17:04 )             30.8     03-    135  |  103  |  56<H>  ----------------------------<  132<H>  5.1   |  24  |  2.20<H>    Ca    9.3      02 Mar 2024 05:30  Phos  4.2     03-02  Mg     2.1     03-02    TPro  6.8  /  Alb  3.4  /  TBili  0.3  /  DBili  x   /  AST  17  /  ALT  15  /  AlkPhos  102  03-02      Urinalysis Basic - ( 02 Mar 2024 13:25 )    Color: Yellow / Appearance: Turbid / S.019 / pH: x  Gluc: x / Ketone: Negative mg/dL  / Bili: Negative / Urobili: 1.0 mg/dL   Blood: x / Protein: >=1000 mg/dL / Nitrite: Negative   Leuk Esterase: Large / RBC: 2 /HPF / WBC 0 /HPF   Sq Epi: x / Non Sq Epi: 1 /HPF / Bacteria: Too Numerous to count /HPF      CAPILLARY BLOOD GLUCOSE      POCT Blood Glucose.: 100 mg/dL (02 Mar 2024 17:55)      Urinalysis with Rflx Culture (collected 24 @ 13:25)    RADIOLOGY & ADDITIONAL TESTS: Reviewed.

## 2024-03-02 NOTE — PROGRESS NOTE ADULT - SUBJECTIVE AND OBJECTIVE BOX
**INCOMPLETE NOTE    OVERNIGHT EVENTS: benzocaine spray due to sore throat, melatonin due to insomnia     SUBJECTIVE:  Patient seen and examined at bedside, comfortable, NAD. Denied fever, chest pain, dyspnea, abdominal pain.     Vital Signs Last 12 Hrs  T(F): 97.8 (03-02-24 @ 05:45), Max: 98.3 (03-01-24 @ 22:38)  HR: 75 (03-02-24 @ 05:45) (75 - 96)  BP: 129/68 (03-02-24 @ 05:45) (129/68 - 163/76)  BP(mean): 94 (03-01-24 @ 23:38) (94 - 112)  RR: 18 (03-02-24 @ 05:45) (16 - 18)  SpO2: 93% (03-02-24 @ 05:45) (93% - 95%)  I&O's Summary    01 Mar 2024 07:01  -  02 Mar 2024 07:00  --------------------------------------------------------  IN: 1245 mL / OUT: 0 mL / NET: 1245 mL    Physical Exam: PHYSICAL EXAM:  General: NAD  HEENT: NC/AT; dry MM  Neck: supple, trachea midline  Cardiovascular: RRR   Respiratory: cough (nonproductive), CTAB  Gastrointestinal: soft, NTND  MSK: R ribcage TTP, lidocaine patch overlying site of pain, no bruising noted   Extremities: WWP; 1+pitting of b/l LE to knees   Vascular: 2+ radial pulses b/l   Neurological: AAOx3, moving all extremities, sensation intact      LABS:                        11.8   8.50  )-----------( 243      ( 01 Mar 2024 16:40 )             37.0     03-01    137  |  100  |  51<H>  ----------------------------<  142<H>  4.2   |  24  |  2.18<H>    Ca    10.5      01 Mar 2024 16:40    TPro  7.7  /  Alb  4.0  /  TBili  0.3  /  DBili  0.2  /  AST  23  /  ALT  19  /  AlkPhos  129<H>  03-01      Urinalysis Basic - ( 01 Mar 2024 16:40 )    Color: x / Appearance: x / SG: x / pH: x  Gluc: 142 mg/dL / Ketone: x  / Bili: x / Urobili: x   Blood: x / Protein: x / Nitrite: x   Leuk Esterase: x / RBC: x / WBC x   Sq Epi: x / Non Sq Epi: x / Bacteria: x          RADIOLOGY & ADDITIONAL TESTS:    MEDICATIONS  (STANDING):  benzocaine 20% Spray 1 Spray(s) Topical three times a day  melatonin 5 milliGRAM(s) Oral at bedtime  pantoprazole Infusion 8 mG/Hr (10 mL/Hr) IV Continuous <Continuous>  sodium chloride 0.9%. 1000 milliLiter(s) (100 mL/Hr) IV Continuous <Continuous>  sucralfate suspension 1 Gram(s) Oral every 6 hours    MEDICATIONS  (PRN):  acetaminophen     Tablet .. 650 milliGRAM(s) Oral every 6 hours PRN Temp greater or equal to 38C (100.4F), Mild Pain (1 - 3)   OVERNIGHT EVENTS: benzocaine spray due to sore throat, melatonin due to insomnia     SUBJECTIVE:  Patient seen and examined at bedside, comfortable, NAD. Denied fever, chest pain, dyspnea, abdominal pain.     Vital Signs Last 12 Hrs  T(F): 97.8 (03-02-24 @ 05:45), Max: 98.3 (03-01-24 @ 22:38)  HR: 75 (03-02-24 @ 05:45) (75 - 96)  BP: 129/68 (03-02-24 @ 05:45) (129/68 - 163/76)  BP(mean): 94 (03-01-24 @ 23:38) (94 - 112)  RR: 18 (03-02-24 @ 05:45) (16 - 18)  SpO2: 93% (03-02-24 @ 05:45) (93% - 95%)  I&O's Summary    01 Mar 2024 07:01  -  02 Mar 2024 07:00  --------------------------------------------------------  IN: 1245 mL / OUT: 0 mL / NET: 1245 mL    Physical Exam: PHYSICAL EXAM:  General: NAD  HEENT: NC/AT; dry MM  Neck: supple, trachea midline  Cardiovascular: RRR   Respiratory: cough (nonproductive), CTAB  Gastrointestinal: +suprapubic TTP, abd otherwise soft, NTND  MSK: R ribcage TTP, lidocaine patch overlying site of pain, no bruising noted   Extremities: WWP; 1+pitting of b/l LE to knees   Vascular: 2+ radial pulses b/l   Neurological: AAOx3, moving all extremities, sensation intact      LABS:                        11.8   8.50  )-----------( 243      ( 01 Mar 2024 16:40 )             37.0     03-01    137  |  100  |  51<H>  ----------------------------<  142<H>  4.2   |  24  |  2.18<H>    Ca    10.5      01 Mar 2024 16:40    TPro  7.7  /  Alb  4.0  /  TBili  0.3  /  DBili  0.2  /  AST  23  /  ALT  19  /  AlkPhos  129<H>  03-01      Urinalysis Basic - ( 01 Mar 2024 16:40 )    Color: x / Appearance: x / SG: x / pH: x  Gluc: 142 mg/dL / Ketone: x  / Bili: x / Urobili: x   Blood: x / Protein: x / Nitrite: x   Leuk Esterase: x / RBC: x / WBC x   Sq Epi: x / Non Sq Epi: x / Bacteria: x          RADIOLOGY & ADDITIONAL TESTS:    MEDICATIONS  (STANDING):  benzocaine 20% Spray 1 Spray(s) Topical three times a day  melatonin 5 milliGRAM(s) Oral at bedtime  pantoprazole Infusion 8 mG/Hr (10 mL/Hr) IV Continuous <Continuous>  sodium chloride 0.9%. 1000 milliLiter(s) (100 mL/Hr) IV Continuous <Continuous>  sucralfate suspension 1 Gram(s) Oral every 6 hours    MEDICATIONS  (PRN):  acetaminophen     Tablet .. 650 milliGRAM(s) Oral every 6 hours PRN Temp greater or equal to 38C (100.4F), Mild Pain (1 - 3)

## 2024-03-02 NOTE — PROGRESS NOTE ADULT - PROBLEM SELECTOR PLAN 7
Patient a former smoker, not on any meds  On exam noted to have a non-productive cough, but CTAB    - continue to monitor O2 requirements Patient with hx of pacreatic insufficiency, per sure scripts patient is on creon    - follow up formal medication reconciliation, resume medications once confirmed/as-appropriate

## 2024-03-02 NOTE — PHYSICAL THERAPY INITIAL EVALUATION ADULT - TRANSFER SAFETY CONCERNS NOTED: SIT/STAND, REHAB EVAL
Performed sit<>stand x 3 trials. Demo fair eccentric control during descend./decreased weight-shifting ability

## 2024-03-02 NOTE — CONSULT NOTE ADULT - SUBJECTIVE AND OBJECTIVE BOX
Initial GI Consult Note:     HPI:   89F with PMH of HTN, HLD, GERD, COPD, CKD IV, pancreatic insufficiency, melanoma, on ASA 81, prior SDH, now p/w n/v after eating chicken and blueberries for lunch. Patient seen and examined at bedside. Currently unable to tolerate secretions. States this has never happened before. No prior EGD. Patient accompanied by friend who is an NP and two home health aids. States that she is having severe acid reflux symptoms that are making her vomit and feels as if chicken is stuck in her mid esophagus. Received glucagon, which did not improve her symptoms.         VITAL SIGNS:  Vital Signs Last 24 Hrs  T(C): 36.9 (01 Mar 2024 18:52), Max: 36.9 (01 Mar 2024 18:52)  T(F): 98.4 (01 Mar 2024 18:52), Max: 98.4 (01 Mar 2024 18:52)  HR: 96 (01 Mar 2024 18:52) (96 - 99)  BP: 150/77 (01 Mar 2024 18:52) (150/77 - 167/77)  BP(mean): --  RR: 17 (01 Mar 2024 18:52) (17 - 18)  SpO2: 95% (01 Mar 2024 18:52) (95% - 95%)    Parameters below as of 01 Mar 2024 18:52  Patient On (Oxygen Delivery Method): room air      PHYSICAL EXAM:  General: No acute distress, thin, frail elderly, not tolerating secretions, spitting into a basin   Lungs: Normal respiratory effort and no intercostal retractions  Cardiovascular: RRR  Abdomen: Soft, non-tender, non-distended  Neurological: Alert and oriented x3, hard of hearing  Skin: Warm and dry. No obvious rash      MEDICATIONS:  MEDICATIONS  (STANDING):  sodium chloride 0.9%. 1000 milliLiter(s) (125 mL/Hr) IV Continuous <Continuous>    MEDICATIONS  (PRN):      ALLERGIES:  Allergies    No Known Allergies    Intolerances        LABS:                        11.8   8.50  )-----------( 243      ( 01 Mar 2024 16:40 )             37.0     03-01    137  |  100  |  51<H>  ----------------------------<  142<H>  4.2   |  24  |  2.18<H>    Ca    10.5      01 Mar 2024 16:40    TPro  7.7  /  Alb  4.0  /  TBili  0.3  /  DBili  0.2  /  AST  23  /  ALT  19  /  AlkPhos  129<H>  03-01      Urinalysis Basic - ( 01 Mar 2024 16:40 )    Color: x / Appearance: x / SG: x / pH: x  Gluc: 142 mg/dL / Ketone: x  / Bili: x / Urobili: x   Blood: x / Protein: x / Nitrite: x   Leuk Esterase: x / RBC: x / WBC x   Sq Epi: x / Non Sq Epi: x / Bacteria: x      CAPILLARY BLOOD GLUCOSE  RADIOLOGY & ADDITIONAL TESTS: Reviewed.    
  Patient is a 89y old  Female who presents with a chief complaint of food impaction (02 Mar 2024 07:07)      HPI:  Patient is a 89 y/o F with pmhx of HTN, HLD, GERD, COPD (former smoker), CKD 4, pancreatic insufficiency, IBS, melanoma of the L leg s/p tibial repair, with hx of SDH, who presented to [facility] with complaints of a sensation of food stuck in her throat. Patient stated her sx's started when she was eating boneless chicken. She admits to choking on food periodically but has never had an impaction in the past.   Patient reports that her after feeling the food impaction, she started vomiting in an effort to spit up the food but the sensation remains.     On ROS patient reports cough and SOB but denies CP, n/v/d, abdominal pain, dysuria, loss of bowel/bladder control, back pain     In the ED  VS: T 97.9, HR 99, /77, RR 18, SpO2 95% RA  Labs: BUN 51, Cr 2.18 (baseline 1.5-1.7), Alk phos 129   EKG: NSR, QTc 486  CT Neck: Air-filled distention of the esophagus with a presumed bolus of food in the distal thoracic esophagus extending of the field of view inferiorly. No cervical mass or cervical lymphadenopathy.  CTAP: No acute CT findings. No bowel obstruction or inflammation. No hydronephrosis or obstructive renal calculi. Age indeterminate mild T10 compression fracture. Moderate hiatal hernia. Intraluminal debris in the mid esophagus, likely related to reflux and may predispose the patient to aspiration.  Orders: ofirmev 1g, glucagon 1mg, lidocaine patch, zofran 4mg, protonix 40mg, NS at 125cc/hr (started at 5PM)  Consults: GI - requested trial of Glucagon and carbonated liquids and CT neck. GI consented patient for EGD. (01 Mar 2024 20:29)    PAST MEDICAL & SURGICAL HISTORY:  Hyperlipidemia  Hyperlipidemia      Essential hypertension  Hypertension      Malignant melanoma of skin  Melanoma      Status post total hysterectomy  S/P hysterectomy      History of total hip replacement  S/P hip replacement      S/P bilateral cataract extraction        MEDICATIONS  (STANDING):  benzocaine 20% Spray 1 Spray(s) Topical three times a day  melatonin 5 milliGRAM(s) Oral at bedtime  pantoprazole Infusion 8 mG/Hr (10 mL/Hr) IV Continuous <Continuous>  sodium chloride 0.9%. 1000 milliLiter(s) (100 mL/Hr) IV Continuous <Continuous>  sucralfate suspension 1 Gram(s) Oral every 6 hours    MEDICATIONS  (PRN):  acetaminophen     Tablet .. 650 milliGRAM(s) Oral every 6 hours PRN Temp greater or equal to 38C (100.4F), Mild Pain (1 - 3)          FAMILY HISTORY:      CBC Full  -  ( 02 Mar 2024 05:30 )  WBC Count : 5.09 K/uL  RBC Count : 3.18 M/uL  Hemoglobin : 9.3 g/dL  Hematocrit : 28.5 %  Platelet Count - Automated : 176 K/uL  Mean Cell Volume : 89.6 fl  Mean Cell Hemoglobin : 29.2 pg  Mean Cell Hemoglobin Concentration : 32.6 gm/dL  Auto Neutrophil # : 4.46 K/uL  Auto Lymphocyte # : 0.45 K/uL  Auto Monocyte # : 0.09 K/uL  Auto Eosinophil # : 0.05 K/uL  Auto Basophil # : 0.05 K/uL  Auto Neutrophil % : 86.0 %  Auto Lymphocyte % : 8.8 %  Auto Monocyte % : 1.7 %  Auto Eosinophil % : 0.9 %  Auto Basophil % : 0.9 %      03-02    135  |  103  |  56<H>  ----------------------------<  132<H>  5.1   |  24  |  2.20<H>    Ca    9.3      02 Mar 2024 05:30  Phos  4.2     03-02  Mg     2.1     03-02    TPro  6.8  /  Alb  3.4  /  TBili  0.3  /  DBili  x   /  AST  17  /  ALT  15  /  AlkPhos  102  03-02      Urinalysis Basic - ( 02 Mar 2024 05:30 )    Color: x / Appearance: x / SG: x / pH: x  Gluc: 132 mg/dL / Ketone: x  / Bili: x / Urobili: x   Blood: x / Protein: x / Nitrite: x   Leuk Esterase: x / RBC: x / WBC x   Sq Epi: x / Non Sq Epi: x / Bacteria: x        Radiology :     < from: CT Neck Soft Tissue No Cont (03.01.24 @ 18:44) >  ACC: [id number] EXAM:  CT NECK SOFT TISSUE   ORDERED BY: RANJITH GEORGE     PROCEDURE DATE:  03/01/2024          INTERPRETATION:  .    CLINICAL INFORMATION: Food impaction, feels that at suprasternal notch.   Included upper chest.    TECHNIQUE: Axial sections were obtained from the skull base to the   sternum without the administration of intravenous contrast. Sagittal and   Coronal reformations were obtained from the source data.    COMPARISON: No prior neck CT studies are available for comparison.    FINDINGS: Evaluation is limited secondary to exclusion of IV contrast.   There is streak and beam hardening artifact generated by dental amalgam.   This limits evaluation of the surrounding structures, particularly the   oral cavity.    There is air-filled distention of the esophagus with heterogeneous   mottled material within the thoracic esophageal lumen which extends off   the field of view inferiorly.    The remainder of the aerodigestive tract has an unremarkable noncontrast   appearance.    No cervical lymphadenopathy is seen.    The salivary glands appear unremarkable.    There is a 6 mm low-density left-sided thyroid nodule. The right thyroid   lobe and isthmus appear unremarkable.    Arterial vascular calcifications areseen. No acute intracranial   abnormalities are imaged. There is evidence of bilateral cataract removal.    The paranasal sinuses and tympanomastoid cavities are clear.    The maxilla, mandible, and zygomatic arches are intact. The TMJ spaces   appear within normal limits.    Multilevel cervical spondylosis is seen.    Bilateral pleural effusions are noted. Biapical pleural thickening and/or   scarring is seen which is minimal.    IMPRESSION: Air-filled distention of the esophagus with a presumedbolus   of food in the distal thoracic esophagus extending of the field of view   inferiorly.    No cervical mass or cervical lymphadenopathy.      < from: CT Abdomen and Pelvis No Cont (03.01.24 @ 18:44) >    ACC: [id number] EXAM:  CT ABDOMEN AND PELVIS   ORDERED BY: RANJIHT GEORGE     PROCEDURE DATE:  03/01/2024          INTERPRETATION:  CLINICAL INFORMATION: Right flank pain and vomiting.    COMPARISON: None.    CONTRAST/COMPLICATIONS:  IV Contrast: None  Oral Contrast: None  Complications: None    PROCEDURE:  CT of the Abdomen and Pelvis was performed.  Sagittal and coronal reformats were performed.    FINDINGS:  LOWER CHEST: Small bilateral pleural effusions. Coronary artery   calcifications. Dense mitral and aortic valve calcifications.    LIVER: Within normal limits.  BILE DUCTS: Normal caliber.  GALLBLADDER: Cholelithiasis.  SPLEEN: Within normal limits.  PANCREAS: 7 mm cystic lesion in the pancreatic head (series 4, image 58).   No pancreatic ductal dilatation.  ADRENALS: 1.4 cm calcification in the right adrenal gland, possibly   related to old infection or hemorrhage.  KIDNEYS/URETERS: No hydronephrosis or obstructive renal calculi.   Bilateral renal cysts, including simple and hemorrhagic cysts, measuring   up to 7.2 cm and the left kidney.    Pelvis partially obscured by streak artifact related to bilateral hip   prostheses/hardware.  BLADDER: Visualized portions are within normal limits.  REPRODUCTIVE ORGANS: Not visualized.    BOWEL: No bowel obstruction. Colonic diverticulosis without evidence of   acute diverticulitis. No acute appendicitis. Moderate hiatal hernia with   intraluminal debris in the mid esophagus.  PERITONEUM: No ascites.  VESSELS: Atherosclerotic changes.  RETROPERITONEUM/LYMPH NODES: No lymphadenopathy.  ABDOMINAL WALL: Mild body wall edema.  BONES: Diffuse osteopenia. Age-indeterminate mild compression fracture of   T10. Multilevel degenerative changes of the visualized thoracolumbar   spine. Right hip prostheses. Status post ORIF of the proximal left femur.    IMPRESSION:  No acute CT findings. No bowel obstruction or inflammation.    No hydronephrosis or obstructive renal calculi.    Age indeterminate mild T10 compression fracture. Correlate with physical   exam for focal tenderness to evaluate acuity.    Moderate hiatal hernia. Intraluminal debris in the mid esophagus, likely   related to reflux and may predispose the patient to aspiration.          Review of Systems : per HPI         Vital Signs Last 24 Hrs  T(C): 36.6 (02 Mar 2024 05:45), Max: 36.9 (01 Mar 2024 18:52)  T(F): 97.8 (02 Mar 2024 05:45), Max: 98.4 (01 Mar 2024 18:52)  HR: 75 (02 Mar 2024 05:45) (75 - 99)  BP: 129/68 (02 Mar 2024 05:45) (129/68 - 167/77)  BP(mean): 94 (01 Mar 2024 23:38) (94 - 112)  RR: 18 (02 Mar 2024 05:45) (16 - 18)  SpO2: 93% (02 Mar 2024 05:45) (93% - 95%)    Parameters below as of 02 Mar 2024 05:45  Patient On (Oxygen Delivery Method): room air            Physical Exam:   89 y o woman lying comfortably in semi Hooper's position , awake , alert , no new complaints     Head: normocephalic , atraumatic    Eyes: PERRLA , EOMI , no nystagmus , sclera anicteric    ENT / FACE: neg nasal discharge , uvula midline , no oropharyngeal erythema / exudate    Neck: supple , negative JVD , negative carotid bruits , no thyromegaly    Chest:  coarse bs     Cardiovascular: regular rate and rhythm , neg murmurs / rubs / gallops    Abdomen: soft , non distended , non tender to palpation in all 4 quadrants ,  normal bowel sounds     Extremities: 1 + edema to shins    Neurologic Exam:     Alert and oriented to person , place        Motor Exam:        > 3+/5 x 4 extremities        Sensation:         intact to light touch x 4 extremities     DTR:           biceps/brachioradialis: equal                            patella/ankle: equal         Gait:  not tested          PM&R Impression: admitted for food impaction, admitted for EGD for disimpaction    - deconditioned        Recommendations / Plan:       1) Physical / Occupational therapy focusing on therapeutic exercises , equipment evaluation , bed mobility/transfer out of bed evaluation , progressive ambulation with assistive devices prn .    2) Current disposition plan recommendation:       - d/c home with home physical therapy    - continue 24/7 home care services

## 2024-03-02 NOTE — PHYSICAL THERAPY INITIAL EVALUATION ADULT - ADDITIONAL COMMENTS
Patient is a community ambulator who lives alone in elevator access apartment, no [facility]. Has 24/7 HHA to assist with all ADLs and ambulation. Ambulates with RW. Attends OPPPT 1x/week.

## 2024-03-02 NOTE — PATIENT PROFILE ADULT - FALL HARM RISK - HARM RISK INTERVENTIONS

## 2024-03-02 NOTE — PHYSICAL THERAPY INITIAL EVALUATION ADULT - PERTINENT HX OF CURRENT PROBLEM, REHAB EVAL
Patient is a 89 y/o F with pmhx of HTN, HLD, GERD, COPD, CKD 4, pancreatic insufficiency, melanoma, with hx of SDH, who presented to [facility] with complaints of a food impaction, admitted for EGD for disimpaction.

## 2024-03-03 LAB
ANION GAP SERPL CALC-SCNC: 8 MMOL/L — SIGNIFICANT CHANGE UP (ref 5–17)
BASOPHILS # BLD AUTO: 0.03 K/UL — SIGNIFICANT CHANGE UP (ref 0–0.2)
BASOPHILS NFR BLD AUTO: 0.6 % — SIGNIFICANT CHANGE UP (ref 0–2)
BUN SERPL-MCNC: 53 MG/DL — HIGH (ref 7–23)
CALCIUM SERPL-MCNC: 9.3 MG/DL — SIGNIFICANT CHANGE UP (ref 8.4–10.5)
CHLORIDE SERPL-SCNC: 102 MMOL/L — SIGNIFICANT CHANGE UP (ref 96–108)
CO2 SERPL-SCNC: 23 MMOL/L — SIGNIFICANT CHANGE UP (ref 22–31)
CREAT SERPL-MCNC: 2.25 MG/DL — HIGH (ref 0.5–1.3)
EGFR: 19 ML/MIN/1.73M2 — LOW
EOSINOPHIL # BLD AUTO: 0.19 K/UL — SIGNIFICANT CHANGE UP (ref 0–0.5)
EOSINOPHIL NFR BLD AUTO: 4 % — SIGNIFICANT CHANGE UP (ref 0–6)
GLUCOSE BLDC GLUCOMTR-MCNC: 108 MG/DL — HIGH (ref 70–99)
GLUCOSE BLDC GLUCOMTR-MCNC: 131 MG/DL — HIGH (ref 70–99)
GLUCOSE BLDC GLUCOMTR-MCNC: 146 MG/DL — HIGH (ref 70–99)
GLUCOSE BLDC GLUCOMTR-MCNC: 86 MG/DL — SIGNIFICANT CHANGE UP (ref 70–99)
GLUCOSE SERPL-MCNC: 80 MG/DL — SIGNIFICANT CHANGE UP (ref 70–99)
HCT VFR BLD CALC: 27.4 % — LOW (ref 34.5–45)
HGB BLD-MCNC: 8.9 G/DL — LOW (ref 11.5–15.5)
IMM GRANULOCYTES NFR BLD AUTO: 0.2 % — SIGNIFICANT CHANGE UP (ref 0–0.9)
LYMPHOCYTES # BLD AUTO: 0.83 K/UL — LOW (ref 1–3.3)
LYMPHOCYTES # BLD AUTO: 17.3 % — SIGNIFICANT CHANGE UP (ref 13–44)
MAGNESIUM SERPL-MCNC: 2 MG/DL — SIGNIFICANT CHANGE UP (ref 1.6–2.6)
MCHC RBC-ENTMCNC: 29.6 PG — SIGNIFICANT CHANGE UP (ref 27–34)
MCHC RBC-ENTMCNC: 32.5 GM/DL — SIGNIFICANT CHANGE UP (ref 32–36)
MCV RBC AUTO: 91 FL — SIGNIFICANT CHANGE UP (ref 80–100)
MONOCYTES # BLD AUTO: 0.3 K/UL — SIGNIFICANT CHANGE UP (ref 0–0.9)
MONOCYTES NFR BLD AUTO: 6.3 % — SIGNIFICANT CHANGE UP (ref 2–14)
NEUTROPHILS # BLD AUTO: 3.44 K/UL — SIGNIFICANT CHANGE UP (ref 1.8–7.4)
NEUTROPHILS NFR BLD AUTO: 71.6 % — SIGNIFICANT CHANGE UP (ref 43–77)
NRBC # BLD: 0 /100 WBCS — SIGNIFICANT CHANGE UP (ref 0–0)
PHOSPHATE SERPL-MCNC: 3.4 MG/DL — SIGNIFICANT CHANGE UP (ref 2.5–4.5)
PLATELET # BLD AUTO: 170 K/UL — SIGNIFICANT CHANGE UP (ref 150–400)
POTASSIUM SERPL-MCNC: 4.1 MMOL/L — SIGNIFICANT CHANGE UP (ref 3.5–5.3)
POTASSIUM SERPL-SCNC: 4.1 MMOL/L — SIGNIFICANT CHANGE UP (ref 3.5–5.3)
RBC # BLD: 3.01 M/UL — LOW (ref 3.8–5.2)
RBC # FLD: 14 % — SIGNIFICANT CHANGE UP (ref 10.3–14.5)
SODIUM SERPL-SCNC: 133 MMOL/L — LOW (ref 135–145)
WBC # BLD: 4.8 K/UL — SIGNIFICANT CHANGE UP (ref 3.8–10.5)
WBC # FLD AUTO: 4.8 K/UL — SIGNIFICANT CHANGE UP (ref 3.8–10.5)

## 2024-03-03 PROCEDURE — 99233 SBSQ HOSP IP/OBS HIGH 50: CPT | Mod: GC

## 2024-03-03 PROCEDURE — 71045 X-RAY EXAM CHEST 1 VIEW: CPT | Mod: 26

## 2024-03-03 RX ORDER — IPRATROPIUM/ALBUTEROL SULFATE 18-103MCG
3 AEROSOL WITH ADAPTER (GRAM) INHALATION EVERY 6 HOURS
Refills: 0 | Status: DISCONTINUED | OUTPATIENT
Start: 2024-03-03 | End: 2024-03-04

## 2024-03-03 RX ORDER — ACETAMINOPHEN 500 MG
1000 TABLET ORAL ONCE
Refills: 0 | Status: COMPLETED | OUTPATIENT
Start: 2024-03-03 | End: 2024-03-03

## 2024-03-03 RX ADMIN — PANTOPRAZOLE SODIUM 10 MG/HR: 20 TABLET, DELAYED RELEASE ORAL at 09:57

## 2024-03-03 RX ADMIN — Medication 5 MILLIGRAM(S): at 22:12

## 2024-03-03 RX ADMIN — CEFTRIAXONE 100 MILLIGRAM(S): 500 INJECTION, POWDER, FOR SOLUTION INTRAMUSCULAR; INTRAVENOUS at 18:56

## 2024-03-03 RX ADMIN — PANTOPRAZOLE SODIUM 10 MG/HR: 20 TABLET, DELAYED RELEASE ORAL at 19:28

## 2024-03-03 RX ADMIN — Medication 650 MILLIGRAM(S): at 21:12

## 2024-03-03 RX ADMIN — Medication 1 SPRAY(S): at 14:01

## 2024-03-03 RX ADMIN — Medication 1 GRAM(S): at 11:23

## 2024-03-03 RX ADMIN — Medication 1 GRAM(S): at 22:12

## 2024-03-03 RX ADMIN — Medication 400 MILLIGRAM(S): at 14:33

## 2024-03-03 RX ADMIN — Medication 1000 MILLIGRAM(S): at 14:48

## 2024-03-03 RX ADMIN — Medication 1 DROP(S): at 22:12

## 2024-03-03 RX ADMIN — Medication 1 SPRAY(S): at 22:12

## 2024-03-03 RX ADMIN — Medication 1 DROP(S): at 14:00

## 2024-03-03 RX ADMIN — Medication 3 MILLILITER(S): at 22:12

## 2024-03-03 RX ADMIN — Medication 1 DROP(S): at 05:59

## 2024-03-03 RX ADMIN — Medication 1 GRAM(S): at 17:04

## 2024-03-03 RX ADMIN — Medication 1 GRAM(S): at 05:59

## 2024-03-03 RX ADMIN — Medication 650 MILLIGRAM(S): at 22:12

## 2024-03-03 NOTE — PROGRESS NOTE ADULT - SUBJECTIVE AND OBJECTIVE BOX
Physical Medicine and Rehabilitation Progress Note :       Patient is a 89y old  Female who presents with a chief complaint of food impaction (02 Mar 2024 18:31)      HPI:  Patient is a 89 y/o F with pmhx of HTN, HLD, GERD, COPD (former smoker), CKD 4, pancreatic insufficiency, IBS, melanoma of the L leg s/p tibial repair, with hx of SDH, who presented to [facility] with complaints of a sensation of food stuck in her throat. Patient stated her sx's started when she was eating boneless chicken. She admits to choking on food periodically but has never had an impaction in the past.   Patient reports that her after feeling the food impaction, she started vomiting in an effort to spit up the food but the sensation remains.     On ROS patient reports cough and SOB but denies CP, n/v/d, abdominal pain, dysuria, loss of bowel/bladder control, back pain     In the ED  VS: T 97.9, HR 99, /77, RR 18, SpO2 95% RA  Labs: BUN 51, Cr 2.18 (baseline 1.5-1.7), Alk phos 129   EKG: NSR, QTc 486  CT Neck: Air-filled distention of the esophagus with a presumed bolus of food in the distal thoracic esophagus extending of the field of view inferiorly. No cervical mass or cervical lymphadenopathy.  CTAP: No acute CT findings. No bowel obstruction or inflammation. No hydronephrosis or obstructive renal calculi. Age indeterminate mild T10 compression fracture. Moderate hiatal hernia. Intraluminal debris in the mid esophagus, likely related to reflux and may predispose the patient to aspiration.  Orders: ofirmev 1g, glucagon 1mg, lidocaine patch, zofran 4mg, protonix 40mg, NS at 125cc/hr (started at 5PM)  Consults: GI - requested trial of Glucagon and carbonated liquids and CT neck. GI consented patient for EGD. (01 Mar 2024 20:29)                            8.9    4.80  )-----------( 170      ( 03 Mar 2024 05:30 )             27.4       03-03    133<L>  |  102  |  53<H>  ----------------------------<  80  4.1   |  23  |  2.25<H>    Ca    9.3      03 Mar 2024 05:30  Phos  3.4     03-03  Mg     2.0     03-03    TPro  6.8  /  Alb  3.4  /  TBili  0.3  /  DBili  x   /  AST  17  /  ALT  15  /  AlkPhos  102  03-02    Vital Signs Last 24 Hrs  T(C): 36.6 (03 Mar 2024 06:05), Max: 36.8 (02 Mar 2024 14:35)  T(F): 97.9 (03 Mar 2024 06:05), Max: 98.3 (02 Mar 2024 14:35)  HR: 78 (03 Mar 2024 06:05) (78 - 86)  BP: 134/74 (03 Mar 2024 06:05) (114/71 - 139/77)  BP(mean): --  RR: 18 (03 Mar 2024 06:05) (17 - 18)  SpO2: 97% (03 Mar 2024 06:05) (92% - 97%)    Parameters below as of 02 Mar 2024 20:07  Patient On (Oxygen Delivery Method): room air        MEDICATIONS  (STANDING):  artificial  tears Solution 1 Drop(s) Both EYES three times a day  benzocaine 20% Spray 1 Spray(s) Topical three times a day  cefTRIAXone   IVPB 1000 milliGRAM(s) IV Intermittent every 24 hours  melatonin 5 milliGRAM(s) Oral at bedtime  pantoprazole Infusion 8 mG/Hr (10 mL/Hr) IV Continuous <Continuous>  sodium chloride 0.9%. 1000 milliLiter(s) (100 mL/Hr) IV Continuous <Continuous>  sucralfate suspension 1 Gram(s) Oral every 6 hours    MEDICATIONS  (PRN):  acetaminophen     Tablet .. 650 milliGRAM(s) Oral every 6 hours PRN Temp greater or equal to 38C (100.4F), Mild Pain (1 - 3)          Initial Functional Status Assessment :     Previous Level of Function:     · Ambulation Skills	needs device and assist  · Transfer Skills	needs device and assist  · ADL Skills	needed assist  · Additional Comments	Patient is a community ambulator who lives alone in elevator access apartment, no [facility]. Has 24/7 HHA to assist with all ADLs and ambulation. Ambulates with RW. Attends OPPPT 1x/week.    Cognitive Status Examination:   · Orientation	oriented to person, place, time and situation  · Level of Consciousness	alert  · Follows Commands and Answers Questions	100% of the time  · Personal Safety and Judgment	intact  · Short Term Memory	intact    Range of Motion Exam:   · Active Range of Motion Examination	bilateral upper extremity Active ROM was WFL (within functional limits); bilateral  lower extremity Active ROM was WFL (within functional limits)    Manual Muscle Testing:   · Manual Muscle Testing Results	BUE and BLE >3/5 grossly assessed via functional mobility    Bed Mobility: Rolling/Turning:     · Level of [place]	minimum assist (75% patients effort)  · Physical Assist/Nonphysical Assist	1 person assist; verbal cues    Bed Mobility: Scooting/Bridging:     · Level of [place]	minimum assist (75% patients effort)  · Physical Assist/Nonphysical Assist	1 person assist; verbal cues    Bed Mobility: Sit to Supine:     · Level of [place]	minimum assist (75% patients effort)  · Physical Assist/Nonphysical Assist	1 person assist; verbal cues    Bed Mobility: Supine to Sit:     · Level of [place]	minimum assist (75% patients effort)  · Physical Assist/Nonphysical Assist	1 person assist; verbal cues    Bed Mobility Analysis:     · Bed Mobility Limitations	decreased ability to use legs for bridging/pushing; decreased ability to use arms for pushing/pulling  · Impairments Contributing to Impaired Bed Mobility	impaired balance; decreased flexibility; impaired postural control; decreased ROM; decreased strength    Transfer: Sit to Stand:     · Level of [place]	minimum assist (75% patients effort)  · Physical Assist/Nonphysical Assist	1 person assist; verbal cues  · Assistive Device	L handheld assist    Transfer: Stand to Sit:     · Level of [place]	minimum assist (75% patients effort)  · Physical Assist/Nonphysical Assist	1 person assist; verbal cues  · Assistive Device	L handheld assist    Sit/Stand Transfer Safety Analysis:     · Transfer Safety Concerns Noted	decreased weight-shifting ability; Performed sit<>stand x 3 trials. Demo fair eccentric control during descend.  · Impairments Contributing to Impaired Transfers	impaired balance; decreased flexibility; impaired postural control; decreased ROM; decreased strength    Gait Skills:     · Level of [place]	minimum assist (75% patients effort)  · Physical Assist/Nonphysical Assist	1 person assist; verbal cues  · Assistive Device	L handheld assist  · Gait Distance	~10 sidesteps at EOB    Gait Analysis:     · Gait Deviations Noted	decreased yajaira; increased time in double stance; decreased velocity of limb motion; decreased step length; decreased stride length; decreased weight-shifting ability  · Impairments Contributing to Gait Deviations	impaired balance; decreased flexibility; impaired postural control; decreased ROM; decreased strength    Balance Skills Assessment:     · Sitting Balance: Static	good balance  · Sitting Balance: Dynamic	good balance  · Sit-to-Stand Balance	poor plus  · Standing Balance: Static	poor plus  · Standing Balance: Dynamic	poor plus  · Systems Impairment Contributing to Balance Disturbance	musculoskeletal  · Identified Impairments Contributing to Balance Disturbance	decreased ROM; decreased strength    Clinical Impressions:   · Criteria for Skilled Therapeutic Interventions	impairments found; rehab potential; therapy frequency; anticipated equipment needs at discharge; anticipated discharge recommendation  · Impairments Found (describe specific impairments)	aerobic capacity/endurance; gait, locomotion, and balance; muscle strength          PM&R Impression : as above    Current disposition plan recommendation :         - d/c home with home physical therapy    - resume 24/7 home care services

## 2024-03-03 NOTE — CHART NOTE - NSCHARTNOTEFT_GEN_A_CORE
Health Care Proxy Form Reviewed  Agent: Valente MCCLAINLiudmila Gonzalez   Phone #: [phone]  Address [address] [place] [address]    IF patient is in a terminal condition, permanently unconscious, or conscious with irreversible brain damage and will never regain the ability to make conditions and express wishes:  NO: cardaic resuscitation, mechanical respiration, tube feeds, antibiotics.  YES: Maximum pain relief.

## 2024-03-03 NOTE — PROGRESS NOTE ADULT - SUBJECTIVE AND OBJECTIVE BOX
Patient is a 89y old  Female who presents with a chief complaint of food impaction (02 Mar 2024 18:31)    INTERVAL EVENTS:    SUBJECTIVE:  Patient was seen and examined at bedside. Upset of being in hospital, reports feeling ok. Denies pther complaints. Aid at the bedside. No other complaints or events reported.    Review of systems: No fever, chills, dizziness, HA, Changes in vision, CP, dyspnea, nausea or vomiting, dysuria, changes in bowel movements, LE edema. Rest of 12 point Review of systems negative unless otherwise documented elsewhere in note.     Diet, Clear Liquid (03-02-24 @ 12:51) [Active]      MEDICATIONS:  MEDICATIONS  (STANDING):  artificial  tears Solution 1 Drop(s) Both EYES three times a day  benzocaine 20% Spray 1 Spray(s) Topical three times a day  cefTRIAXone   IVPB 1000 milliGRAM(s) IV Intermittent every 24 hours  melatonin 5 milliGRAM(s) Oral at bedtime  pantoprazole Infusion 8 mG/Hr (10 mL/Hr) IV Continuous <Continuous>  sodium chloride 0.9%. 1000 milliLiter(s) (100 mL/Hr) IV Continuous <Continuous>  sucralfate suspension 1 Gram(s) Oral every 6 hours    MEDICATIONS  (PRN):  acetaminophen     Tablet .. 650 milliGRAM(s) Oral every 6 hours PRN Temp greater or equal to 38C (100.4F), Mild Pain (1 - 3)      Allergies    No Known Allergies    Intolerances        OBJECTIVE:  Vital Signs Last 24 Hrs  T(C): 36.6 (03 Mar 2024 06:05), Max: 36.8 (02 Mar 2024 14:35)  T(F): 97.9 (03 Mar 2024 06:05), Max: 98.3 (02 Mar 2024 14:35)  HR: 78 (03 Mar 2024 06:05) (78 - 86)  BP: 134/74 (03 Mar 2024 06:05) (114/71 - 139/77)  BP(mean): --  RR: 18 (03 Mar 2024 06:05) (17 - 18)  SpO2: 97% (03 Mar 2024 06:05) (92% - 97%)    Parameters below as of 02 Mar 2024 20:07  Patient On (Oxygen Delivery Method): room air      I&O's Summary    02 Mar 2024 07:01  -  03 Mar 2024 07:00  --------------------------------------------------------  IN: 790 mL / OUT: 0 mL / NET: 790 mL    03 Mar 2024 07:01  -  03 Mar 2024 10:18  --------------------------------------------------------  IN: 20 mL / OUT: 0 mL / NET: 20 mL        PHYSICAL EXAM:  General: AOX3, NAD, lying in bed, speaking in full sentences, no labored breathing on RA  HEENT: AT/NC. mp facial asymmetry  Lungs: poor inspiration, no crackles  Heart: RRR  Abdomen: soft, non-tender  Extremities:  warm, no edema, no tenderness, no calf tenderness, no focal deficit     LABS:                        8.9    4.80  )-----------( 170      ( 03 Mar 2024 05:30 )             27.4     03-03    133<L>  |  102  |  53<H>  ----------------------------<  80  4.1   |  23  |  2.25<H>    Ca    9.3      03 Mar 2024 05:30  Phos  3.4     03-03  Mg     2.0     03-03    TPro  6.8  /  Alb  3.4  /  TBili  0.3  /  DBili  x   /  AST  17  /  ALT  15  /  AlkPhos  102  03-02    LIVER FUNCTIONS - ( 02 Mar 2024 05:30 )  Alb: 3.4 g/dL / Pro: 6.8 g/dL / ALK PHOS: 102 U/L / ALT: 15 U/L / AST: 17 U/L / GGT: x             CAPILLARY BLOOD GLUCOSE      POCT Blood Glucose.: 86 mg/dL (03 Mar 2024 06:11)  POCT Blood Glucose.: 108 mg/dL (03 Mar 2024 00:46)  POCT Blood Glucose.: 100 mg/dL (02 Mar 2024 17:55)  POCT Blood Glucose.: 121 mg/dL (02 Mar 2024 13:00)    Urinalysis Basic - ( 03 Mar 2024 05:30 )    Color: x / Appearance: x / SG: x / pH: x  Gluc: 80 mg/dL / Ketone: x  / Bili: x / Urobili: x   Blood: x / Protein: x / Nitrite: x   Leuk Esterase: x / RBC: x / WBC x   Sq Epi: x / Non Sq Epi: x / Bacteria: x        MICRODATA:    Culture - Urine (collected 02 Mar 2024 13:25)  Source: Clean Catch None  Preliminary Report (03 Mar 2024 10:14):    >100,000 CFU/ml Gram Negative Rods    Identification and susceptibility to follow.    Urinalysis with Rflx Culture (collected 02 Mar 2024 13:25)        RADIOLOGY/OTHER STUDIES:

## 2024-03-04 ENCOUNTER — TRANSCRIPTION ENCOUNTER (OUTPATIENT)
Age: 89
End: 2024-03-04

## 2024-03-04 VITALS — SYSTOLIC BLOOD PRESSURE: 180 MMHG | DIASTOLIC BLOOD PRESSURE: 70 MMHG

## 2024-03-04 DIAGNOSIS — N39.0 URINARY TRACT INFECTION, SITE NOT SPECIFIED: ICD-10-CM

## 2024-03-04 LAB
-  AMPICILLIN/SULBACTAM: SIGNIFICANT CHANGE UP
-  AMPICILLIN: SIGNIFICANT CHANGE UP
-  CEFAZOLIN: SIGNIFICANT CHANGE UP
-  CEFEPIME: SIGNIFICANT CHANGE UP
-  CEFTRIAXONE: SIGNIFICANT CHANGE UP
-  CIPROFLOXACIN: SIGNIFICANT CHANGE UP
-  ERTAPENEM: SIGNIFICANT CHANGE UP
-  GENTAMICIN: SIGNIFICANT CHANGE UP
-  NITROFURANTOIN: SIGNIFICANT CHANGE UP
-  PIPERACILLIN/TAZOBACTAM: SIGNIFICANT CHANGE UP
-  TOBRAMYCIN: SIGNIFICANT CHANGE UP
-  TRIMETHOPRIM/SULFAMETHOXAZOLE: SIGNIFICANT CHANGE UP
ALBUMIN SERPL ELPH-MCNC: 3.3 G/DL — SIGNIFICANT CHANGE UP (ref 3.3–5)
ALP SERPL-CCNC: 103 U/L — SIGNIFICANT CHANGE UP (ref 40–120)
ALT FLD-CCNC: 17 U/L — SIGNIFICANT CHANGE UP (ref 10–45)
ANION GAP SERPL CALC-SCNC: 13 MMOL/L — SIGNIFICANT CHANGE UP (ref 5–17)
AST SERPL-CCNC: 22 U/L — SIGNIFICANT CHANGE UP (ref 10–40)
BASOPHILS # BLD AUTO: 0.03 K/UL — SIGNIFICANT CHANGE UP (ref 0–0.2)
BASOPHILS NFR BLD AUTO: 0.6 % — SIGNIFICANT CHANGE UP (ref 0–2)
BILIRUB SERPL-MCNC: 0.3 MG/DL — SIGNIFICANT CHANGE UP (ref 0.2–1.2)
BUN SERPL-MCNC: 40 MG/DL — HIGH (ref 7–23)
CALCIUM SERPL-MCNC: 9.2 MG/DL — SIGNIFICANT CHANGE UP (ref 8.4–10.5)
CHLORIDE SERPL-SCNC: 102 MMOL/L — SIGNIFICANT CHANGE UP (ref 96–108)
CO2 SERPL-SCNC: 20 MMOL/L — LOW (ref 22–31)
CREAT SERPL-MCNC: 2.02 MG/DL — HIGH (ref 0.5–1.3)
CULTURE RESULTS: ABNORMAL
EGFR: 22 ML/MIN/1.73M2 — LOW
EOSINOPHIL # BLD AUTO: 0.22 K/UL — SIGNIFICANT CHANGE UP (ref 0–0.5)
EOSINOPHIL NFR BLD AUTO: 4.3 % — SIGNIFICANT CHANGE UP (ref 0–6)
GLUCOSE BLDC GLUCOMTR-MCNC: 103 MG/DL — HIGH (ref 70–99)
GLUCOSE BLDC GLUCOMTR-MCNC: 138 MG/DL — HIGH (ref 70–99)
GLUCOSE BLDC GLUCOMTR-MCNC: 159 MG/DL — HIGH (ref 70–99)
GLUCOSE SERPL-MCNC: 121 MG/DL — HIGH (ref 70–99)
HCT VFR BLD CALC: 28.4 % — LOW (ref 34.5–45)
HGB BLD-MCNC: 9.3 G/DL — LOW (ref 11.5–15.5)
IMM GRANULOCYTES NFR BLD AUTO: 0.4 % — SIGNIFICANT CHANGE UP (ref 0–0.9)
LYMPHOCYTES # BLD AUTO: 0.68 K/UL — LOW (ref 1–3.3)
LYMPHOCYTES # BLD AUTO: 13.3 % — SIGNIFICANT CHANGE UP (ref 13–44)
MAGNESIUM SERPL-MCNC: 1.9 MG/DL — SIGNIFICANT CHANGE UP (ref 1.6–2.6)
MCHC RBC-ENTMCNC: 29.6 PG — SIGNIFICANT CHANGE UP (ref 27–34)
MCHC RBC-ENTMCNC: 32.7 GM/DL — SIGNIFICANT CHANGE UP (ref 32–36)
MCV RBC AUTO: 90.4 FL — SIGNIFICANT CHANGE UP (ref 80–100)
METHOD TYPE: SIGNIFICANT CHANGE UP
MONOCYTES # BLD AUTO: 0.29 K/UL — SIGNIFICANT CHANGE UP (ref 0–0.9)
MONOCYTES NFR BLD AUTO: 5.7 % — SIGNIFICANT CHANGE UP (ref 2–14)
NEUTROPHILS # BLD AUTO: 3.89 K/UL — SIGNIFICANT CHANGE UP (ref 1.8–7.4)
NEUTROPHILS NFR BLD AUTO: 75.7 % — SIGNIFICANT CHANGE UP (ref 43–77)
NRBC # BLD: 0 /100 WBCS — SIGNIFICANT CHANGE UP (ref 0–0)
ORGANISM # SPEC MICROSCOPIC CNT: ABNORMAL
ORGANISM # SPEC MICROSCOPIC CNT: SIGNIFICANT CHANGE UP
PHOSPHATE SERPL-MCNC: 3.4 MG/DL — SIGNIFICANT CHANGE UP (ref 2.5–4.5)
PLATELET # BLD AUTO: 183 K/UL — SIGNIFICANT CHANGE UP (ref 150–400)
POTASSIUM SERPL-MCNC: 3.3 MMOL/L — LOW (ref 3.5–5.3)
POTASSIUM SERPL-SCNC: 3.3 MMOL/L — LOW (ref 3.5–5.3)
PROT SERPL-MCNC: 6.7 G/DL — SIGNIFICANT CHANGE UP (ref 6–8.3)
RBC # BLD: 3.14 M/UL — LOW (ref 3.8–5.2)
RBC # FLD: 13.9 % — SIGNIFICANT CHANGE UP (ref 10.3–14.5)
SODIUM SERPL-SCNC: 135 MMOL/L — SIGNIFICANT CHANGE UP (ref 135–145)
SPECIMEN SOURCE: SIGNIFICANT CHANGE UP
WBC # BLD: 5.13 K/UL — SIGNIFICANT CHANGE UP (ref 3.8–10.5)
WBC # FLD AUTO: 5.13 K/UL — SIGNIFICANT CHANGE UP (ref 3.8–10.5)

## 2024-03-04 PROCEDURE — 99232 SBSQ HOSP IP/OBS MODERATE 35: CPT | Mod: GC

## 2024-03-04 PROCEDURE — 99233 SBSQ HOSP IP/OBS HIGH 50: CPT | Mod: GC

## 2024-03-04 PROCEDURE — 71045 X-RAY EXAM CHEST 1 VIEW: CPT | Mod: 26

## 2024-03-04 PROCEDURE — 74220 X-RAY XM ESOPHAGUS 1CNTRST: CPT | Mod: 26

## 2024-03-04 RX ORDER — MIRABEGRON 50 MG/1
1 TABLET, EXTENDED RELEASE ORAL
Refills: 0 | DISCHARGE

## 2024-03-04 RX ORDER — PANTOPRAZOLE SODIUM 20 MG/1
8 TABLET, DELAYED RELEASE ORAL
Qty: 80 | Refills: 0 | Status: DISCONTINUED | OUTPATIENT
Start: 2024-03-04 | End: 2024-03-04

## 2024-03-04 RX ORDER — SIMVASTATIN 20 MG/1
1 TABLET, FILM COATED ORAL
Qty: 0 | Refills: 0 | DISCHARGE

## 2024-03-04 RX ORDER — ZOLPIDEM TARTRATE 10 MG/1
1 TABLET ORAL
Refills: 0 | DISCHARGE

## 2024-03-04 RX ORDER — ATORVASTATIN CALCIUM 80 MG/1
1 TABLET, FILM COATED ORAL
Refills: 0 | DISCHARGE

## 2024-03-04 RX ORDER — AMLODIPINE BESYLATE 2.5 MG/1
1 TABLET ORAL
Qty: 0 | Refills: 0 | DISCHARGE

## 2024-03-04 RX ORDER — AMLODIPINE BESYLATE 2.5 MG/1
5 TABLET ORAL ONCE
Refills: 0 | Status: COMPLETED | OUTPATIENT
Start: 2024-03-04 | End: 2024-03-04

## 2024-03-04 RX ORDER — CHOLECALCIFEROL (VITAMIN D3) 125 MCG
1 CAPSULE ORAL
Qty: 0 | Refills: 0 | DISCHARGE

## 2024-03-04 RX ORDER — CEFPODOXIME PROXETIL 100 MG
1 TABLET ORAL
Qty: 6 | Refills: 0
Start: 2024-03-04 | End: 2024-03-06

## 2024-03-04 RX ORDER — IPRATROPIUM/ALBUTEROL SULFATE 18-103MCG
3 AEROSOL WITH ADAPTER (GRAM) INHALATION
Qty: 0 | Refills: 0 | DISCHARGE
Start: 2024-03-04

## 2024-03-04 RX ORDER — HYDRALAZINE HCL 50 MG
10 TABLET ORAL ONCE
Refills: 0 | Status: COMPLETED | OUTPATIENT
Start: 2024-03-04 | End: 2024-03-04

## 2024-03-04 RX ORDER — CALCIUM CARBONATE 500(1250)
1 TABLET ORAL
Qty: 0 | Refills: 0 | DISCHARGE

## 2024-03-04 RX ORDER — SUCRALFATE 1 G
10 TABLET ORAL
Qty: 560 | Refills: 0
Start: 2024-03-04 | End: 2024-03-17

## 2024-03-04 RX ORDER — CEFPODOXIME PROXETIL 100 MG
1 TABLET ORAL
Qty: 3 | Refills: 0
Start: 2024-03-04 | End: 2024-03-06

## 2024-03-04 RX ORDER — MESALAMINE 400 MG
1 TABLET, DELAYED RELEASE (ENTERIC COATED) ORAL
Qty: 0 | Refills: 0 | DISCHARGE

## 2024-03-04 RX ORDER — ESCITALOPRAM OXALATE 10 MG/1
1 TABLET, FILM COATED ORAL
Refills: 0 | DISCHARGE

## 2024-03-04 RX ORDER — POTASSIUM CHLORIDE 20 MEQ
40 PACKET (EA) ORAL ONCE
Refills: 0 | Status: COMPLETED | OUTPATIENT
Start: 2024-03-04 | End: 2024-03-04

## 2024-03-04 RX ORDER — IPRATROPIUM/ALBUTEROL SULFATE 18-103MCG
3 AEROSOL WITH ADAPTER (GRAM) INHALATION ONCE
Refills: 0 | Status: COMPLETED | OUTPATIENT
Start: 2024-03-04 | End: 2024-03-04

## 2024-03-04 RX ORDER — POTASSIUM CHLORIDE 20 MEQ
20 PACKET (EA) ORAL ONCE
Refills: 0 | Status: COMPLETED | OUTPATIENT
Start: 2024-03-04 | End: 2024-03-04

## 2024-03-04 RX ORDER — SIMETHICONE 80 MG/1
2 TABLET, CHEWABLE ORAL
Refills: 0 | DISCHARGE

## 2024-03-04 RX ORDER — ZOLPIDEM TARTRATE 10 MG/1
5 TABLET ORAL ONCE
Refills: 0 | Status: DISCONTINUED | OUTPATIENT
Start: 2024-03-04 | End: 2024-03-04

## 2024-03-04 RX ORDER — FAMOTIDINE 10 MG/ML
1 INJECTION INTRAVENOUS
Qty: 0 | Refills: 0 | DISCHARGE

## 2024-03-04 RX ORDER — IRBESARTAN 75 MG/1
1 TABLET ORAL
Qty: 0 | Refills: 0 | DISCHARGE

## 2024-03-04 RX ORDER — PANTOPRAZOLE SODIUM 20 MG/1
1 TABLET, DELAYED RELEASE ORAL
Qty: 28 | Refills: 0
Start: 2024-03-04 | End: 2024-03-17

## 2024-03-04 RX ORDER — LIPASE/PROTEASE/AMYLASE 16-48-48K
1 CAPSULE,DELAYED RELEASE (ENTERIC COATED) ORAL
Qty: 0 | Refills: 0 | DISCHARGE

## 2024-03-04 RX ADMIN — Medication 3 MILLILITER(S): at 06:18

## 2024-03-04 RX ADMIN — Medication 10 MILLIGRAM(S): at 19:41

## 2024-03-04 RX ADMIN — Medication 1 DROP(S): at 06:18

## 2024-03-04 RX ADMIN — Medication 1 GRAM(S): at 06:18

## 2024-03-04 RX ADMIN — Medication 40 MILLIEQUIVALENT(S): at 09:22

## 2024-03-04 RX ADMIN — PANTOPRAZOLE SODIUM 10 MG/HR: 20 TABLET, DELAYED RELEASE ORAL at 14:14

## 2024-03-04 RX ADMIN — Medication 1 GRAM(S): at 11:34

## 2024-03-04 RX ADMIN — ZOLPIDEM TARTRATE 5 MILLIGRAM(S): 10 TABLET ORAL at 02:00

## 2024-03-04 RX ADMIN — Medication 1 SPRAY(S): at 06:17

## 2024-03-04 RX ADMIN — Medication 3 MILLILITER(S): at 07:06

## 2024-03-04 RX ADMIN — Medication 1 GRAM(S): at 17:33

## 2024-03-04 RX ADMIN — Medication 20 MILLIEQUIVALENT(S): at 14:14

## 2024-03-04 RX ADMIN — AMLODIPINE BESYLATE 5 MILLIGRAM(S): 2.5 TABLET ORAL at 09:14

## 2024-03-04 RX ADMIN — Medication 3 MILLILITER(S): at 11:34

## 2024-03-04 NOTE — PROGRESS NOTE ADULT - ASSESSMENT
89F with PMH of HTN, HLD, GERD, COPD, CKD IV, pancreatic insufficiency, melanoma, on ASA 81, prior SDH, who first p/w n/v after eating chicken and blueberries for lunch.    Underwent EGD for food impaction on 03/01/24, which showed:   - food bolus of partially chewed chicken and blueberries completely obstructing the esophageal lumen was seen 20 cm from the incisors   - superficial but large erosions that were oozing were seen in the esophagus immediately upon entry before any attempts were made to remove food bolus   - food bolus removed with multiple attempts at suctioning, using a Tripod, a RescueNet, and a rat tooth forceps   - food bolus was eventually completely removed and the scope was able to traverse the GE junction into the stomach   - three clips were placed at friable areas of the esophagus noted to be oozing to achieve hemostasis   - normal stomach  - normal duodenum     Recommendations:   - continue clear liquid diet   - continue pantoprazole gtt for 72 hours 03/01 at 11 PM to 03/04   - carafate suspension 1g PO QID   - trend Hb and maintain active type and screen   - obtain esophogram today  - SLP evaluation  - Advance diet to pureed pending esophagram    Loren Galvan MD  Gastroenterology Fellow, PGY -6  Weekday Pager [phone]
89F with PMH of HTN, HLD, GERD, COPD, CKD IV, pancreatic insufficiency, melanoma, on ASA 81, prior SDH, who first p/w n/v after eating chicken and blueberries for lunch.    Underwent EGD for food impaction on 03/01/24, which showed:   - food bolus of partially chewed chicken and blueberries completely obstructing the esophageal lumen was seen 20 cm from the incisors   - superficial but large erosions that were oozing were seen in the esophagus immediately upon entry before any attempts were made to remove food bolus   - food bolus removed with multiple attempts at suctioning, using a Tripod, a RescueNet, and a rat tooth forceps   - food bolus was eventually completely removed and the scope was able to traverse the GE junction into the stomach   - three clips were placed at friable areas of the esophagus noted to be oozing to achieve hemostasis   - normal stomach  - normal duodenum     Recommendations:   - can advance to clear liquid diet today   - continue pantoprazole gtt for 72 hours 03/01 at 11 PM to 03/04   - start carafate suspension 1g PO QID   - trend Hb and maintain active type and screen   - obtain esophogram tomorrow AM     Case discussed with Dr. Ziegler. GI Team will continue to follow.     Janette Friend D.O.   Gastroenterology Fellow  Weekday 7am-5pm Pager: [phone]  Weeknights/Weekend/Holiday Coverage: Please call the  for contact info.
{\rtf1\gbeuwt04330\ansi\xykqcvk6622\ftnbj\uc1\deff0  {\fonttbl{\f0 \fnil Segoe UI;}{\f1 \fnil \fcharset0 Segoe UI;}{\f2 \fnil Times [employer] [place];}}  {\colortbl ;\hpe896\imbov378\ayum049 ;\red0\green0\blue0 ;\red0\green0\epmn191 ;\red0\green0\blue0 ;}  {\stylesheet{\f0\fs20 Normal;}{\cs1 Default Paragraph Font;}{\cs2\f0\fs16 Line Number;}{\cs3\f2\fs24\ul\cf3 Hyperlink;}}  {\*\revtbl{Unknown;}}  \jfwnrk58609\iyhjzw07248\loawv5188\arjeq1740\ncvdk1612\mhuzn1415\quxoppf908\vesmtwq326\nogrowautofit\dfflma570\formshade\nofeaturethrottle1\dntblnsbdb\fet4\aendnotes\aftnnrlc\pgbrdrhead\pgbrdrfoot  \sectd\mhehlg08266\fdafto76793\guttersxn0\bumyscpn2284\kxlpsziz8569\kesjdgtf5301\dqkkprjk7595\dolotjh309\zkeambs819\sbkpage\pgncont\pgndec  \plain\plain\f0\fs24\ql\plain\f0\fs24\plain\f0\fs20\vwsm9851\hich\f0\dbch\f0\loch\f0\fs20\par  I M\par  \par  89 y/o F with pmhx of HTN, HLD, GERD, COPD, CKD 4, pancreatic insufficiency, melanoma, with hx of SDH, who presented to [facility] with complaints of a food impaction, admitted for EGD for disimpaction. \par  \par  \plain\f1\fs20\lahl1422\hich\f1\dbch\f1\loch\f1\cf2\fs20\ul{\field{\*\fldinst HYPERLINK [id number] }{\fldrslt Problem/Plan - 1:}}\plain\f0\fs20\scer5405\hich\f0\dbch\f0\loch\f0\fs20\ql\par  \'b7  {\*\bkmkstart [id number]}{\*\bkmkend kx63202430310}Problem: {\*\bkmkstart dw32352830807}{\*\bkmkend dz49099691150}Food impaction of esophagus. \par  \'b7  {\*\bkmkstart eg21446829382}{\*\bkmkend wh18239060321}Plan: {\*\bkmkstart yi57890710600}{\*\bkmkend xr15862544312}GI following, recommendations appreciated \par  3/1 pt underwent EGD following failed trial of glucagon & carbonated beverages\par  per GI, patient was noted to have bleeding in the esophagus prior to intervention, likely due to maceration iso food bolus, during egd large food bolus consisting of chicken and blueberries was extracted\par  \par  - clear liquid diet for now\par  - continue to trend hgb given esophagel bleeding\par  - continue PPI gtt x 72h (started 3/1)\par  - carafate suspension 1g q6 hrs\par  - trend Hgb, maintain active T&S\par  - follow up esophagram (likely monday, 3/4).\par  \par  \plain\f1\fs20\qbci0042\hich\f1\dbch\f1\loch\f1\cf2\fs20\ul{\field{\*\fldinst HYPERLINK [id number] }{\fldrslt Problem/Plan - 2:}}\plain\f0\fs20\vvja7589\hich\f0\dbch\f0\loch\f0\fs20\ql\par  \'b7  {\*\bkmkstart ye50668057087}{\*\bkmkend wk27562834907}Problem: {\*\bkmkstart eh15427491726}{\*\bkmkend xz42272246302}Acute kidney injury superimposed on CKD. \par  \'b7  {\*\bkmkstart bm95026041709}{\*\bkmkend wn50187241071}Plan: {\*\bkmkstart km83237910337}{\*\bkmkend so89702939999}Patient with CKD 4, baseline Cr 1.5-1.7, follows up with Dr. Toro. Cr on admission 2.18. Etiology includes prerenal iso poor PO intake   following esophageal food impaction, dry MM on exam. Patient started on NS 125cc/hr in the ED\par  \par  - continue NS 100cc/hr for 10 hours \par  - trend Cr.\par  \par  \plain\f1\fs20\htcy1694\hich\f1\dbch\f1\loch\f1\cf2\fs20\ul{\field{\*\fldinst HYPERLINK [id number] }{\fldrslt Problem/Plan - 3:}}\plain\f0\fs20\dzss6285\hich\f0\dbch\f0\loch\f0\fs20\ql\par  \'b7  {\*\bkmkstart pn95352964216}{\*\bkmkend hy34572070423}Problem: {\*\bkmkstart ys42051933721}{\*\bkmkend eb20444432739}Dysuria. \par  \'b7  {\*\bkmkstart fk16886281731}{\*\bkmkend ie76631282494}Plan: {\*\bkmkstart ag38176567185}{\*\bkmkend ah72316543368}UA+, Cx pending but pt w prior cx +proteus, CTX-sensitive\par  per radiology, ct ap non-con is comprable to ct stone-specific studies, therefore no concern for renal calculi given negative ct ap 3/1\par  \par  - follow up UCx & sensitivities\par  - continue cxt 1g qd x3 days for now, adjust as-needed.\plain\f1\fs20\rqvc5786\hich\f1\dbch\f1\loch\f1\cf2\fs20\strike\plain\f0\fs20\nxge2247\hich\f0\dbch\f0\loch\f0\fs20\par  \par  \plain\f1\fs20\lhta6545\hich\f1\dbch\f1\loch\f1\cf2\fs20\ul{\field{\*\fldinst HYPERLINK [id number] }{\fldrslt Problem/Plan - 4:}}\plain\f0\fs20\fnnp4260\hich\f0\dbch\f0\loch\f0\fs20\ql\par  \'b7  {\*\bkmkstart rn20677137735}{\*\bkmkend le42075293671}Problem: {\*\bkmkstart he48747286416}{\*\bkmkend ds34009862737}Hypertension. \par  \'b7  {\*\bkmkstart si98003908472}{\*\bkmkend ym79738703258}Plan: {\*\bkmkstart bi04752102536}{\*\bkmkend nc44585171771}Patient on ?amlodipine 5mg and irbesartan 150mg\par  \par  - follow up formal medication reconciliation, resume medications once confirmed/as-appropriate\par  - hold irbesartan 150mg qd iso LENCHO, resume as-appropriate.\par  \par  \plain\f1\fs20\ttam0420\hich\f1\dbch\f1\loch\f1\cf2\fs20\ul{\field{\*\fldinst HYPERLINK [id number] }{\fldrslt Problem/Plan - 5:}}\plain\f0\fs20\opmp1104\hich\f0\dbch\f0\loch\f0\fs20\ql\par  \'b7  {\*\bkmkstart le10494045632}{\*\bkmkend cb69932801305}Problem: {\*\bkmkstart mk40874327989}{\*\bkmkend yn31946722849}Anemia. \par  \'b7  {\*\bkmkstart vz83142124043}{\*\bkmkend zz32437727846}Plan: {\*\bkmkstart yw21233919183}{\*\bkmkend it70962246876}Patient with known hx of anemia, likely 2/2 CKD. Follows up with Dr. Toro outpatient, last seen 1/2024, recommended an iron panel   and consideration of EPO as needed. \par  \par  - follow up outpatient with Dr. Toro.\par  \par  \plain\f1\fs20\sxnm9855\hich\f1\dbch\f1\loch\f1\cf2\fs20\ul{\field{\*\fldinst HYPERLINK [id number] }{\fldrslt Problem/Plan - 6:}}\plain\f0\fs20\xqqx3137\hich\f0\dbch\f0\loch\f0\fs20\ql\par  \'b7  {\*\bkmkstart fb58102503424}{\*\bkmkend jg73538385947}Problem: {\*\bkmkstart eb00604592958}{\*\bkmkend lr36822704833}HLD (hyperlipidemia). \par  \'b7  {\*\bkmkstart cb76107482395}{\*\bkmkend vw92240029858}Plan: {\*\bkmkstart ny11580433233}{\*\bkmkend gd94582285574}Patient on ?atorvastatin 10mg qd\par  \par  - follow up formal medication reconciliation, resume medications once confirmed/as-appropriate.\par  \par  \plain\f1\fs20\aiev6983\hich\f1\dbch\f1\loch\f1\cf2\fs20\ul{\field{\*\fldinst HYPERLINK [id number] }{\fldrslt Problem/Plan - 7:}}\plain\f0\fs20\dfyb4523\hich\f0\dbch\f0\loch\f0\fs20\ql\par  \'b7  {\*\bkmkstart td01860675641}{\*\bkmkend pg87891428425}Problem: {\*\bkmkstart dx21818673105}{\*\bkmkend ca84772476419}Pancreatic insufficiency. \par  \'b7  {\*\bkmkstart qm60991442565}{\*\bkmkend ot25046381971}Plan: {\*\bkmkstart vu54875044474}{\*\bkmkend to59862063171}Patient with hx of pacreatic insufficiency, per sure scripts patient is on creon\par  \par  - follow up formal medication reconciliation, resume medications once confirmed/as-appropriate.\par  \par  \plain\f1\fs20\dosx0753\hich\f1\dbch\f1\loch\f1\cf2\fs20\ul{\field{\*\fldinst HYPERLINK [id number] }{\fldrslt Problem/Plan - 8:}}\plain\f0\fs20\qigj2704\hich\f0\dbch\f0\loch\f0\fs20\ql\par  \'b7  {\*\bkmkstart pc20629893524}{\*\bkmkend ms80650270280}Problem: {\*\bkmkstart jc44932751838}{\*\bkmkend kl79416821001}COPD (chronic obstructive pulmonary disease). \par  \'b7  {\*\bkmkstart ie88784394641}{\*\bkmkend lq69008871517}Plan: {\*\bkmkstart ia04566100081}{\*\bkmkend sq31314004101}Patient a former smoker, not on any meds\par  On exam noted to have a non-productive cough, but CTAB\par  \par  - continue to monitor O2 requirements.\par  \plain\f1\fs16\saof7598\hich\f1\dbch\f1\loch\f1\cf2\fs16\par  \plain\f1\fs16\uiqe1571\hich\f1\dbch\f1\loch\f1\cf2\fs16\b\ul{\field{\*\fldinst HYPERLINK [id number] }{\fldrslt Problem/Plan - 9:}}\plain\f1\fs16\ahsu1748\hich\f1\dbch\f1\loch\f1\cf2\fs16\ql\par  \'b7  {\*\bkmkstart td28927390799}{\*\bkmkend jz43740858879}Problem: {\*\bkmkstart iv40010894267}{\*\bkmkend ym89569087801}Prophylactic measure. \par  \'b7  {\*\bkmkstart oy62311849095}{\*\bkmkend me25227179905}Plan: {\*\bkmkstart hx08916485651}{\*\bkmkend ph69272055660}F: s/p NS 125cc/hr for 8 hours, now on 100cc/hr for 10 hours\par  E: replete as needed\par  N: NPO except meds \par  DVT ppx: SCD's given bleeding noted on EGD\par  GI ppx: PPI gtt and carafate\par  \par  DISPO: RMF.\par  \plain\f0\fs20\jpdw4195\hich\f0\dbch\f0\loch\f0\fs20\par  }  
a 89 y/o F with pmhx of HTN, HLD, GERD, COPD, CKD 4, pancreatic insufficiency, melanoma, with hx of SDH, who presented to [facility] with complaints of a food impaction, admitted for EGD for disimpaction.        Problem/Plan - 1:  ·  Problem: Food impaction of esophagus, GIB   ·  Plan: GI following, recommendations appreciated   3/1 pt underwent EGD following failed trial of glucagon & carbonated beverages  per GI, patient was noted to have bleeding in the esophagus prior to intervention, likely due to maceration iso food bolus, during egd large food bolus consisting of chicken and blueberries was extracted    - clear liquid diet for now, will advance as toletated   - continue to trend hgb given esophagel bleeding  - continue PPI gtt x 72h (started 3/1)  - carafate suspension 1g q6 hrs  - trend Hgb, maintain active T&S  - follow up esophagram (likely monday, 3/4).     Problem/Plan - 2:  ·  Problem: Acute kidney injury superimposed on CKD.   ·  Plan: Patient with CKD 4, baseline Cr 1.5-1.7, follows up with Dr. Toro. Cr on admission 2.18. Etiology includes prerenal iso poor PO intake following esophageal food impaction, dry MM on exam. Patient started on NS 125cc/hr in the ED    - continue NS 100cc/hr for 10 hours   - trend Cr.     Problem/Plan - 3:  ·  Problem: UTI.   ·  Plan: UA+, Cx pending but pt w prior cx +proteus, CTX-sensitive  per radiology, ct ap non-con is comprable to ct stone-specific studies, therefore no concern for renal calculi given negative ct ap 3/1    - follow up UCx & sensitivities  - continue cxt 1g qd x3 days for now, adjust as-needed.     Problem/Plan - 4:  ·  Problem: Hypertension.   ·  Plan: Patient on ?amlodipine 5mg and irbesartan 150mg    - follow up formal medication reconciliation, resume medications once confirmed/as-appropriate  - hold irbesartan 150mg qd iso LENCHO, resume as-appropriate.     Problem/Plan - 5:  ·  Problem: Anemia.   ·  Plan: Patient with known hx of anemia, likely 2/2 CKD. Follows up with Dr. Toro outpatient, last seen 1/2024, recommended an iron panel and consideration of EPO as needed.     - follow up outpatient with Dr. Toro.     Problem/Plan - 6:  ·  Problem: HLD (hyperlipidemia).   ·  Plan: Patient on ?atorvastatin 10mg qd    - follow up formal medication reconciliation, resume medications once confirmed/as-appropriate.     Problem/Plan - 7:  ·  Problem: Pancreatic insufficiency.   ·  Plan: Patient with hx of pacreatic insufficiency, per sure scripts patient is on creon    - follow up formal medication reconciliation, resume medications once confirmed/as-appropriate.     Problem/Plan - 8:  ·  Problem: COPD (chronic obstructive pulmonary disease).   ·  Plan: Patient a former smoker, not on any meds  On exam noted to have a non-productive cough, but CTAB    - continue to monitor O2 requirements.     Problem/Plan - 9:  ·  Problem: Prophylactic measure.   ·  Plan: F: s/p NS 125cc/hr for 8 hours, now on 100cc/hr for 10 hours  E: replete as needed  DVT ppx: SCD's given bleeding noted on EGD  GI ppx: PPI gtt and carafate    DISPO: F.    
Patient is a 89 y/o F with pmhx of HTN, HLD, GERD, COPD, CKD 4, pancreatic insufficiency, melanoma, with hx of SDH, who presented to [facility] with complaints of a food impaction, admitted for EGD for disimpaction. 
Patient is a 89 y/o F with pmhx of HTN, HLD, GERD, COPD, CKD 4, pancreatic insufficiency, melanoma, with hx of SDH, who presented to [facility] with complaints of a food impaction, admitted for EGD for disimpaction.

## 2024-03-04 NOTE — PROGRESS NOTE ADULT - PROBLEM SELECTOR PLAN 8
Patient a former smoker, not on any meds  On exam noted to have a non-productive cough, but CTAB  - continue to monitor O2 requirements

## 2024-03-04 NOTE — PROGRESS NOTE ADULT - PROBLEM SELECTOR PLAN 1
GI following, recommendations appreciated   3/1 pt underwent EGD following failed trial of glucagon & carbonated beverages  per GI, patient was noted to have bleeding in the esophagus prior to intervention, likely due to maceration iso food bolus, during egd large food bolus consisting of chicken and blueberries was extracted  - clear liquid diet for now  - continue to trend hgb given esophagel bleeding  - continue PPI gtt x 72h (started 3/1)  - carafate suspension 1g q6 hrs  - trend Hgb, maintain active T&S  - follow up esophagram (likely Monday, 3/4)

## 2024-03-04 NOTE — DISCHARGE NOTE PROVIDER - NSDCFUSCHEDAPPT_GEN_ALL_CORE_FT
Fabiano Toro  [facility] Physician [facility]  [address]  Scheduled Appointment: 03/07/2024

## 2024-03-04 NOTE — DISCHARGE NOTE NURSING/CASE MANAGEMENT/SOCIAL WORK - PATIENT PORTAL LINK FT
You can access the FollowMyHealth Patient Portal offered by [facility] by registering at the following website: [email or web address]. By joining [facility]’s FollowMyHealth portal, you will also be able to view your health information using other applications (apps) compatible with our system.

## 2024-03-04 NOTE — DISCHARGE NOTE PROVIDER - HOSPITAL COURSE
#Discharge: do not delete    Patient is __ yo M/F with past medical history of _____  Presented with _____, found to have _____    Hospital course (by problem):     New medications:   Medications that were changed:   Medications that were stopped:    Items to follow up as outpatient:     Physical exam at discharge:   #Discharge: do not delete    Patient is __ yo M/F with past medical history of _____  Presented with _____, found to have _____    Hospital course (by problem):   #Food impaction of esophagus.   GI following, recommendations appreciated   3/1 pt underwent EGD following failed trial of glucagon & carbonated beverages  per GI, patient was noted to have bleeding in the esophagus prior to intervention, likely due to maceration iso food bolus, during egd large food bolus consisting of chicken and blueberries was extracted  - clear liquid diet for now  - continue to trend hgb given esophagel bleeding  - continue PPI gtt x 72h (started 3/1)  - carafate suspension 1g q6 hrs  - trend Hgb, maintain active T&S  - follow up esophagram (likely Monday, 3/4).    #Acute kidney injury superimposed on CKD.   Patient with CKD 4, baseline Cr 1.5-1.7, follows up with Dr. Toro. Cr on admission 2.18. Etiology includes prerenal iso poor PO intake following esophageal food impaction, dry MM on exam. Patient started on NS 125cc/hr in the ED  - continue NS 100cc/hr for 10 hours   - trend Cr  - f/u with Dr. Toro.    #UTI (urinary tract infection).   Patient with dysuria. UA+, Cx pending but pt w prior cx +proteus, CTX-sensitive. Per radiology, CT AP non-con is comparable to CT stone-specific studies, therefore no concern for renal calculi given negative CT AP 3/1  - follow up UCx & sensitivities  - continue CTX 1g qd x3 days for now, adjust as-needed.    #Hypertension.   Patient on ?amlodipine 5mg and irbesartan 150mg  - follow up formal medication reconciliation, resume medications once confirmed/as-appropriate  - hold irbesartan 150mg qd iso LENCHO, resume as-appropriate.    #Anemia.   Patient with known hx of anemia, likely 2/2 CKD. Follows up with Dr. Toro outpatient, last seen 1/2024, recommended an iron panel and consideration of EPO as needed.   - follow up outpatient with Dr. Toro.    #HLD (hyperlipidemia).   ·  Plan: Patient on ?atorvastatin 10mg qd  - follow up formal medication reconciliation, resume medications once confirmed/as-appropriate.    #Pancreatic insufficiency.   Patient with hx of pacreatic insufficiency, per sure scripts patient is on Creon  - follow up formal medication reconciliation, resume medications once confirmed/as-appropriate.    #COPD (chronic obstructive pulmonary disease).   Patient a former smoker, not on any meds  On exam noted to have a non-productive cough, but CTAB  - continue to monitor O2 requirements.    New medications:   Medications that were changed:   Medications that were stopped:    Items to follow up as outpatient:     Physical exam at discharge:   #Discharge: do not delete    Patient is a 89 y/o F with pmhx of HTN, HLD, GERD, COPD, CKD 4, pancreatic insufficiency, melanoma, with hx of SDH, who presented to [facility] with complaints of a food impaction, admitted for EGD for disimpaction.     Hospital course (by problem):   #Food impaction of esophagus.   GI following, recommendations appreciated. 3/1 pt underwent EGD following failed trial of glucagon & carbonated beverages. Per GI, patient was noted to have bleeding in the esophagus prior to intervention, likely due to maceration iso food bolus, during EGD large food bolus consisting of chicken and blueberries was extracted.  Esophagram 3/4/2024 shows __________  - continue PPI gtt x 72h (started 3/1), discharge on oral PPI BID  - Carafate suspension 1g q6 hrs  - advanced diet to Minced and Moist per Speech & Swallow recommendation  - follow up outpatient with GI fellow clinic    #Acute kidney injury superimposed on CKD - Improving  Patient with CKD 4, baseline Cr 1.5-1.7, follows up with Dr. Toro. Cr on admission 2.18. Etiology includes prerenal iso poor PO intake following esophageal food impaction, dry MM on exam. Patient started on NS 125cc/hr in the ED. S/p NS 100cc/hr for 10 hours   Cr downtrending to 2.02 on 3/4/2024  - f/u with Dr. Toro outpatient    #UTI (urinary tract infection).   Patient with dysuria. UA+, Cx pending but pt w prior cx +proteus, CTX-sensitive. Per radiology, CT AP non-con is comparable to CT stone-specific studies, therefore no concern for renal calculi given negative CT AP 3/1  - S/p CTX 1g qd x2 days, discharge on Cefpodoxime 200mg qD    #CAP (community-acquired pneumonia)  Patient has cough and sore throat. Patient required Oxygen NC overnight of 3/3/2024, weaned to room air on 3/4/2024. Bilateral pleural effusion and/or opacity on CXR.  - S/p CTX 1g qd x2 days, discharge on Cefpodoxime 200mg qD for 3 more days.    #Hypertension.   Patient on home amlodipine 5mg.  - continue with amlodipine 5mg    #Anemia.   Patient with known hx of anemia, likely 2/2 CKD. Follows up with Dr. Toro outpatient, last seen 1/2024, recommended an iron panel and consideration of EPO as needed.   - follow up outpatient with Dr. Toro.    #HLD (hyperlipidemia).   ·  Plan: Patient on ?atorvastatin 10mg qd  - follow up formal medication reconciliation, resume medications once confirmed/as-appropriate.    #Pancreatic insufficiency.   Patient with hx of pacreatic insufficiency, per sure scripts patient is on Creon  - follow up formal medication reconciliation, resume medications once confirmed/as-appropriate.    #COPD (chronic obstructive pulmonary disease).   Patient a former smoker, not on any meds  On exam noted to have a non-productive cough, but CTAB  - continue to monitor O2 requirements.    New medications: Cefpodoxime 200mg qD, pantoprazole 40mg BID, Carafate suspension 1g q6h  Medications that were changed: none  Medications that were stopped: none    Items to follow up as outpatient: F/u with Dr. Toro and GI fellow clinic    Physical exam at discharge:   #Discharge: do not delete    Patient is a 89 y/o F with pmhx of HTN, HLD, GERD, COPD, CKD 4, pancreatic insufficiency, melanoma, with hx of SDH, who presented to [facility] with complaints of a food impaction, admitted for EGD for disimpaction.     Hospital course (by problem):   #Food impaction of esophagus.   GI following, recommendations appreciated. 3/1 pt underwent EGD following failed trial of glucagon & carbonated beverages. Per GI, patient was noted to have bleeding in the esophagus prior to intervention, likely due to maceration iso food bolus, during EGD large food bolus consisting of chicken and blueberries was extracted.  Esophagram 3/4/2024 shows no leakage; Esophageal dysmotility, particularly in the distal third of the esophagus. This could be secondary to postprocedural edema.  - continue PPI gtt x 72h (started 3/1), discharge on oral PPI BID  - Carafate suspension 1g q6 hrs  - advanced diet to Minced and Moist per Speech & Swallow recommendation  - follow up outpatient with GI    #Acute kidney injury superimposed on CKD - Improving  Patient with CKD 4, baseline Cr 1.5-1.7, follows up with Dr. Toro. Cr on admission 2.18. Etiology includes prerenal iso poor PO intake following esophageal food impaction, dry MM on exam. Patient started on NS 125cc/hr in the ED. S/p NS 100cc/hr for 10 hours   Cr downtrending to 2.02 on 3/4/2024  - f/u with Dr. Toro outpatient    #UTI (urinary tract infection).   Patient with dysuria. UA+, Cx pending but pt w prior cx +proteus, CTX-sensitive. Per radiology, CT AP non-con is comparable to CT stone-specific studies, therefore no concern for renal calculi given negative CT AP 3/1  - S/p CTX 1g qd x2 days, discharge on Cefpodoxime 200mg qD    #CAP (community-acquired pneumonia)  Patient has cough and sore throat. Patient required Oxygen NC overnight of 3/3/2024, weaned to room air on 3/4/2024. Bilateral pleural effusion and/or opacity on CXR.  - S/p CTX 1g qd x2 days, discharge on Cefpodoxime 200mg qD for 3 more days.    #Hypertension.   Patient on home amlodipine 5mg.  - continue with amlodipine 5mg    #Anemia.   Patient with known hx of anemia, likely 2/2 CKD. Follows up with Dr. Toro outpatient, last seen 1/2024, recommended an iron panel and consideration of EPO as needed.   - follow up outpatient with Dr. Toro.    #HLD (hyperlipidemia).   ·  Plan: Patient on ?atorvastatin 10mg qd  - follow up formal medication reconciliation, resume medications once confirmed/as-appropriate.    #Pancreatic insufficiency.   Patient with hx of pacreatic insufficiency, per sure scripts patient is on Creon  - follow up formal medication reconciliation, resume medications once confirmed/as-appropriate.    #COPD (chronic obstructive pulmonary disease).   Patient a former smoker, not on any meds  On exam noted to have a non-productive cough, but CTAB  - continue to monitor O2 requirements.    New medications: Cefpodoxime 200mg qD, pantoprazole 40mg BID, Carafate suspension 1g q6h  Medications that were changed: none  Medications that were stopped: none    Items to follow up as outpatient: F/u with Dr. Toro and GI    Physical exam at discharge:  General: NAD  HEENT: NC/AT; MMM  Neck: supple, trachea midline  Cardiovascular: RRR, no murmur  Respiratory: cough (nonproductive), CTAB  Gastrointestinal: abd otherwise soft, NTND, bowel sounds present  Extremities: WWP; 1+pitting of b/l LE to knees   Vascular: 2+ radial pulses b/l   Neurological: AAOx3, moving all extremities, sensation intact

## 2024-03-04 NOTE — SWALLOW BEDSIDE ASSESSMENT ADULT - ADDITIONAL RECOMMENDATIONS
Educated pt and HHA re safe food prep and swallow strategies:  1) Small bites, especially of meat, ground meat preferred.  2) Avoid hard/ dry foods such as raw veggies, cook until softer  3) Alternate solids and liquids   4) Add sauce/ gravy to increase moisture  5) Slow pace with thorough mastication

## 2024-03-04 NOTE — PROGRESS NOTE ADULT - ATTENDING COMMENTS
Patient seen and discussed with fellow plan as noted above
Patient seen, examined, and discussed with Dr. Galvan. Agree with above. 89F with a h/o HTN, HLD, GERD, COPD, CKD IV, pancreatic insufficiency, melanoma, on ASA 81, prior SDH, who first p/w n/v after eating chicken and blueberries for lunch. S/p EGD with removal of food impaction, TTS clips x3 placed at esophageal erosions that were oozing. Can transition to IV PPI BID tonight. Continue carafate suspension 1g QID. Following esophagram, consider advancing to pureed diet. Appreciate SLP eval.     Demi Guardado MD  Gastroenterology
Clinically feeling fine, reports poor sleep, denies other complaints    Labs, imaging reviewed    Noted with drop in Hb, continue on PPI. Esophagram pending. GI follow-up. monitor FS q6h while NPO, will discuss with GI if ok to advance diet  Monitor creatinine, gentle IVF. monitor UOP. bladder scan protocol  BP acceptable off medication
Overnight events noted, patient off 02, denies SOB, no dysuria. Aid at the bedside    Labs, imaging reviewed    Hb stable, follow-up esophagram  Urine growing Proteus, will continue on Cephalosporins  CXR reviewed, pleural effusion improving, patient off 92. Denies SOB, no pleuritic pain, continue on monitoring respiratory status  SCD  Can DC later today pending results of Esophagram

## 2024-03-04 NOTE — DISCHARGE NOTE PROVIDER - NSDCCPTREATMENT_GEN_ALL_CORE_FT
PRINCIPAL PROCEDURE  Procedure: EGD  Findings and Treatment: Impression:  Food bolus of partially chewed chicken and blueberries completely obstructing the esophageal lumen was seen 20 cm from the incisors. Superficial but large erosions that were oozing were seen in the esophagus immediately upon entry before any attempts were made to remove food bolus. Food bolus removed with multiple attempts at suctioning, using a Tripod, a RescueNet, and a rat tooth forceps. Food bolus was eventually completely removed and the scope was able to traverse the GE junction in the stomach.  Esophagitis compatible with nonspecific erosive esophagitis. (Endoclip).  3 endoclips were placed in the midesophagus for the purpose of hemostasis.

## 2024-03-04 NOTE — DISCHARGE NOTE NURSING/CASE MANAGEMENT/SOCIAL WORK - NSDCVIVACCINE_GEN_ALL_CORE_FT
Tdap; 10-Dec-2021 11:26; Ubaldo Meredith (MORENA); [employer]; [id number] (Exp. Date: 09-Sep-2023); IntraMuscular; Deltoid Left.; 0.5 milliLiter(s); VIS (VIS Published: 09-May-2013, VIS Presented: 10-Dec-2021);

## 2024-03-04 NOTE — PROGRESS NOTE ADULT - PROBLEM SELECTOR PLAN 4
Patient on ?amlodipine 5mg and irbesartan 150mg  - follow up formal medication reconciliation, resume medications once confirmed/as-appropriate  - hold irbesartan 150mg qd iso LENCHO, resume as-appropriate

## 2024-03-04 NOTE — PROGRESS NOTE ADULT - PROVIDER SPECIALTY LIST ADULT
Internal Medicine
Rehab Medicine
Gastroenterology
Gastroenterology
Internal Medicine
Internal Medicine

## 2024-03-04 NOTE — DISCHARGE NOTE PROVIDER - NSDCFUADDAPPT_GEN_ALL_CORE_FT
Please follow up with Dr. Toro on 3/7/2024    Please follow up with Demi Gould (GI) if you have persistent symptoms of food impaction. [phone] (1) Please follow up with Dr. Toro on 3/7/2024    Please follow up with Demi Gould (GI) if you have persistent symptoms of food impaction. [phone].    Please bring your Insurance Card, Photo ID and Discharge paperwork to the following appointment:    (2) Please follow up with your Gastroenterology Provider, Dr. Demi Guardado at [address] [place] [address], [address] [place] [address] on 3/13/2024 at 10:30am.    Appointment was scheduled by Ms. MARLO Torres, Referral Coordinator.

## 2024-03-04 NOTE — SWALLOW BEDSIDE ASSESSMENT ADULT - SLP PERTINENT HISTORY OF CURRENT PROBLEM
Patient is a 89 y/o F with pmhx of HTN, HLD, GERD, COPD, CKD 4, pancreatic insufficiency, melanoma, with hx of SDH, who presented to [facility] with complaints of a food impaction, admitted for EGD for disimpaction. Per EGD note, "food bolus of partially chewed chicken and blueberries completely obstructing the esophageal lumen was seen 20 cm from the incisors".

## 2024-03-04 NOTE — DISCHARGE NOTE PROVIDER - PROVIDER TOKENS
PROVIDER:[TOKEN:[[id number]],FOLLOWUP:[2 weeks]] PROVIDER:[TOKEN:[[id number]],FOLLOWUP:[2 weeks]],PROVIDER:[TOKEN:[[id number]],FOLLOWUP:[1 month]] PROVIDER:[TOKEN:[[id number]],FOLLOWUP:[2 weeks]],PROVIDER:[TOKEN:[[id number]],FOLLOWUP:[1 month]] PROVIDER:[TOKEN:[[id number]],FOLLOWUP:[2 weeks]],PROVIDER:[TOKEN:[[id number]],SCHEDULEDAPPT:[03/13/2024],SCHEDULEDAPPTTIME:[10:30 AM]]

## 2024-03-04 NOTE — DISCHARGE NOTE NURSING/CASE MANAGEMENT/SOCIAL WORK - NSDCFUADDAPPT_GEN_ALL_CORE_FT
Please follow up with Dr. Toro on 3/7/2024    Please follow up with Demi Gould (GI) if you have persistent symptoms of food impaction. [phone]

## 2024-03-04 NOTE — DISCHARGE NOTE PROVIDER - NSDCMRMEDTOKEN_GEN_ALL_CORE_FT
amLODIPine 5 mg oral tablet: 1 tab(s) orally once a day  busPIRone 7.5 mg oral tablet: 1 tab(s) orally once a day  calcium (as carbonate) 500 mg oral tablet: 1 tab(s) orally 2 times a day  Creon 24,000 units oral delayed release capsule: 1 cap(s) orally once a day  famotidine 20 mg oral tablet: 1 tab(s) orally once a day  irbesartan 150 mg oral tablet: 1 tab(s) orally once a day  mesalamine 1.2 g oral delayed release tablet: 1 tab(s) orally once a day  Vitamin D3 25 mcg (1000 intl units) oral tablet: 1 tab(s) orally once a day  zolpidem 12.5 mg oral tablet, extended release: 1 tab(s) orally once a day (at bedtime)   amLODIPine 5 mg oral tablet: 1 tab(s) orally once a day  atorvastatin 10 mg oral tablet: 1 tab(s) orally once a day  Creon 24,000 units oral delayed release capsule: 1 cap(s) orally once a day  escitalopram 10 mg oral tablet: 1 tab(s) orally once a day (at bedtime)  Myrbetriq 25 mg oral tablet, extended release: 1 tab(s) orally once a day  simethicone 125 mg oral capsule: 2 cap(s) orally once a day as needed for gas  Vitamin D3 25 mcg (1000 intl units) oral tablet: 1 tab(s) orally once a day  zolpidem 12.5 mg oral tablet, extended release: 1 tab(s) orally once a day (at bedtime)  zolpidem 12.5 mg oral tablet, extended release: 1 tab(s) orally once a day (at bedtime)   amLODIPine 5 mg oral tablet: 1 tab(s) orally once a day  atorvastatin 10 mg oral tablet: 1 tab(s) orally once a day  Carafate 1 g/10 mL oral suspension: 10 milliliter(s) orally every 6 hours  cefpodoxime 200 mg oral tablet: 1 tab(s) orally once a day  Creon 24,000 units oral delayed release capsule: 1 cap(s) orally once a day  escitalopram 10 mg oral tablet: 1 tab(s) orally once a day (at bedtime)  ipratropium-albuterol 0.5 mg-2.5 mg/3 mL inhalation solution: 3 milliliter(s) inhaled every 6 hours  Myrbetriq 25 mg oral tablet, extended release: 1 tab(s) orally once a day  pantoprazole 40 mg oral delayed release tablet: 1 tab(s) orally 2 times a day  simethicone 125 mg oral capsule: 2 cap(s) orally once a day as needed for gas  Vitamin D3 25 mcg (1000 intl units) oral tablet: 1 tab(s) orally once a day  zolpidem 12.5 mg oral tablet, extended release: 1 tab(s) orally once a day (at bedtime)

## 2024-03-04 NOTE — DISCHARGE NOTE PROVIDER - NSDCCPCAREPLAN_GEN_ALL_CORE_FT
PRINCIPAL DISCHARGE DIAGNOSIS  Diagnosis: Food impaction of esophagus  Assessment and Plan of Treatment: You were found to      SECONDARY DISCHARGE DIAGNOSES  Diagnosis: UTI (urinary tract infection)  Assessment and Plan of Treatment: Your episode of unresponsiveness was likely due to a UTI. A urinary tract infection (UTI) is caused by bacteria that get inside your urinary tract. Most bacteria that enter your urinary tract come out when you urinate. If the bacteria stays in your urinary tract, you may get an infection. Your urinary tract includes your kidneys, ureters, bladder, and urethra. Urine is made in your kidneys, and it flows from the ureters to the bladder. Urine leaves the bladder through the urethra. A UTI is more common in your lower urinary tract, which includes your bladder and urethra.  Please take the Cefpodoxime as prescribed.  To prevent another UTI, you can do the following:  Empty your bladder often.   Wipe from front to back after you urinate or have a bowel movement. This will help prevent germs from getting into your urinary tract.  Drink water, but do not drink alcohol, caffeine, or citrus juices. These can irritate your bladder and increase your symptoms.  Urinate after you have sex. This can help flush out bacteria passed during sex.  Do not douche or use feminine deodorants. These can change the chemical balance in your vagina.  Do pelvic muscle exercises often. Pelvic muscle exercises may help you start and stop urinating. Strong pelvic muscles may help you empty your bladder easier. Squeeze these muscles tightly for 5 seconds like you are trying to hold back urine. Then relax for 5 seconds. Gradually work up to squeezing for 10 seconds. Do 3 sets of 15 repetitions a day  Seek care immediately if:  •You are urinating very little or not at all.  •You have a high fever with shaking chills.  •You have side or back pain that gets worse.      Diagnosis: Pneumonia  Assessment and Plan of Treatment: You were suspected to have a community acquired pneumonia, which is an infection in your lungs. Chest X-rays were completed to monitor your pneumonia. In the hospital, your health care providers helped you breathe better, gave you medicine to help your body get rid of the germs that cause pneumonia, and also made sure you got enough liquids and nutrients.  Please make sure to take the cefpodoxime as prescribed.   If you have worsening shortness of breath, coughs, or increased sputum production, please return to the hospital.     PRINCIPAL DISCHARGE DIAGNOSIS  Diagnosis: Food impaction of esophagus  Assessment and Plan of Treatment: During the hospitalization, an EGD was completed to remove the food impation. Please see the findings under the EGD report. Please continue to take the pantoprazole and carafrate as prescribed. Please follow up with gastrointestinal doctors if you experience persistent or worsening symptoms.      SECONDARY DISCHARGE DIAGNOSES  Diagnosis: UTI (urinary tract infection)  Assessment and Plan of Treatment: Your episode of unresponsiveness was likely due to a UTI. A urinary tract infection (UTI) is caused by bacteria that get inside your urinary tract. Most bacteria that enter your urinary tract come out when you urinate. If the bacteria stays in your urinary tract, you may get an infection. Your urinary tract includes your kidneys, ureters, bladder, and urethra. Urine is made in your kidneys, and it flows from the ureters to the bladder. Urine leaves the bladder through the urethra. A UTI is more common in your lower urinary tract, which includes your bladder and urethra.  Please take the Cefpodoxime as prescribed.  To prevent another UTI, you can do the following:  Empty your bladder often.   Wipe from front to back after you urinate or have a bowel movement. This will help prevent germs from getting into your urinary tract.  Drink water, but do not drink alcohol, caffeine, or citrus juices. These can irritate your bladder and increase your symptoms.  Urinate after you have sex. This can help flush out bacteria passed during sex.  Do not douche or use feminine deodorants. These can change the chemical balance in your vagina.  Do pelvic muscle exercises often. Pelvic muscle exercises may help you start and stop urinating. Strong pelvic muscles may help you empty your bladder easier. Squeeze these muscles tightly for 5 seconds like you are trying to hold back urine. Then relax for 5 seconds. Gradually work up to squeezing for 10 seconds. Do 3 sets of 15 repetitions a day  Seek care immediately if:  •You are urinating very little or not at all.  •You have a high fever with shaking chills.  •You have side or back pain that gets worse.      Diagnosis: Pneumonia  Assessment and Plan of Treatment: You were suspected to have a community acquired pneumonia, which is an infection in your lungs. Chest X-rays were completed to monitor your pneumonia. In the hospital, your health care providers helped you breathe better, gave you medicine to help your body get rid of the germs that cause pneumonia, and also made sure you got enough liquids and nutrients.  Please make sure to take the cefpodoxime as prescribed.   If you have worsening shortness of breath, coughs, or increased sputum production, please return to the hospital.

## 2024-03-04 NOTE — PROGRESS NOTE ADULT - PROBLEM SELECTOR PLAN 6
Patient on ?atorvastatin 10mg qd  - follow up formal medication reconciliation, resume medications once confirmed/as-appropriate

## 2024-03-04 NOTE — PROGRESS NOTE ADULT - PROBLEM SELECTOR PLAN 7
Patient with hx of pacreatic insufficiency, per sure scripts patient is on Creon  - follow up formal medication reconciliation, resume medications once confirmed/as-appropriate

## 2024-03-04 NOTE — SWALLOW BEDSIDE ASSESSMENT ADULT - SLP GENERAL OBSERVATIONS
Pt appreciated awake and alert in bed with 2L O2 via NC. Pt AO x4, able to follow commands, and participate in conversation. Pt reports she consumes a regular diet generally does not have difficulty chewing or swallowing. HHA reports occ coughing, however denies choking episodes. Mild hoarse vocal quality.

## 2024-03-04 NOTE — PROGRESS NOTE ADULT - PROBLEM SELECTOR PLAN 3
Patient with dysuria. UA+, Cx pending but pt w prior cx +proteus, CTX-sensitive. Per radiology, CT AP non-con is comparable to CT stone-specific studies, therefore no concern for renal calculi given negative CT AP 3/1  - follow up UCx & sensitivities  - continue CTX 1g qd x3 days for now, adjust as-needed.

## 2024-03-04 NOTE — DISCHARGE NOTE PROVIDER - CARE PROVIDER_API CALL
Fabiano Toro  Nephrology  [address], [address]  [place] [address]  Phone: [phone]  Fax: [phone]  Follow Up Time: 2 weeks   Fabiano Toro  Nephrology  [address], [address]  [place] [address]  Phone: [phone]  Fax: [phone]  Follow Up Time: 2 weeks    Loren Galvan  Phone: ()-  Fax: ()-  Follow Up Time: 1 month   Fabiano Toro  Nephrology  [address], [address]  [place] [address]  Phone: [phone]  Fax: [phone]  Follow Up Time: 2 weeks    Demi Guardado  Gastroenterology  [address], [address]  [place] [address]  Phone: [phone]  Fax: [phone]  Follow Up Time: 1 month   Fabiano Toro  Nephrology  [address], [address]  [place] [address]  Phone: [phone]  Fax: [phone]  Follow Up Time: 2 weeks    Demi Guardado  Gastroenterology  [address], [address]  [place] [address]  Phone: [phone]  Fax: [phone]  Scheduled Appointment: 03/13/2024 10:30 AM

## 2024-03-04 NOTE — PROGRESS NOTE ADULT - SUBJECTIVE AND OBJECTIVE BOX
**INCOMPLETE NOTE    OVERNIGHT EVENTS:    SUBJECTIVE:  Patient seen and examined at bedside.  No fever, chills, dizziness, headache, changes in vision, chest pain, dyspnea, nausea or vomiting, dysuria, changes in bowel movements, LE edema. Rest of 12 point Review of systems negative unless otherwise documented elsewhere in note.     Vital Signs Last 12 Hrs  T(F): 97.8 (03-04-24 @ 05:31), Max: 97.9 (03-03-24 @ 20:48)  HR: 86 (03-04-24 @ 05:31) (86 - 93)  BP: 187/97 (03-04-24 @ 05:31) (138/71 - 191/90)  BP(mean): --  RR: 18 (03-04-24 @ 05:31) (18 - 20)  SpO2: 100% (03-04-24 @ 05:31) (87% - 100%)  I&O's Summary    02 Mar 2024 07:01  -  03 Mar 2024 07:00  --------------------------------------------------------  IN: 790 mL / OUT: 0 mL / NET: 790 mL    03 Mar 2024 07:01  -  04 Mar 2024 06:43  --------------------------------------------------------  IN: 250 mL / OUT: 0 mL / NET: 250 mL        PHYSICAL EXAM:  General: NAD  HEENT: NC/AT; dry MM  Neck: supple, trachea midline  Cardiovascular: RRR   Respiratory: cough (nonproductive), CTAB  Gastrointestinal: +suprapubic TTP, abd otherwise soft, NTND  MSK: R ribcage TTP, lidocaine patch overlying site of pain, no bruising noted   Extremities: WWP; 1+pitting of b/l LE to knees   Vascular: 2+ radial pulses b/l   Neurological: AAOx3, moving all extremities, sensation intact        LABS:                        8.9    4.80  )-----------( 170      ( 03 Mar 2024 05:30 )             27.4     03-03    133<L>  |  102  |  53<H>  ----------------------------<  80  4.1   |  23  |  2.25<H>    Ca    9.3      03 Mar 2024 05:30  Phos  3.4     03-03  Mg     2.0     03-03        Urinalysis Basic - ( 03 Mar 2024 05:30 )    Color: x / Appearance: x / SG: x / pH: x  Gluc: 80 mg/dL / Ketone: x  / Bili: x / Urobili: x   Blood: x / Protein: x / Nitrite: x   Leuk Esterase: x / RBC: x / WBC x   Sq Epi: x / Non Sq Epi: x / Bacteria: x          RADIOLOGY & ADDITIONAL TESTS:    MEDICATIONS  (STANDING):  albuterol/ipratropium for Nebulization 3 milliLiter(s) Nebulizer every 6 hours  artificial  tears Solution 1 Drop(s) Both EYES three times a day  benzocaine 20% Spray 1 Spray(s) Topical three times a day  cefTRIAXone   IVPB 1000 milliGRAM(s) IV Intermittent every 24 hours  melatonin 5 milliGRAM(s) Oral at bedtime  pantoprazole Infusion 8 mG/Hr (10 mL/Hr) IV Continuous <Continuous>  sodium chloride 0.9%. 1000 milliLiter(s) (100 mL/Hr) IV Continuous <Continuous>  sucralfate suspension 1 Gram(s) Oral every 6 hours    MEDICATIONS  (PRN):  acetaminophen     Tablet .. 650 milliGRAM(s) Oral every 6 hours PRN Temp greater or equal to 38C (100.4F), Mild Pain (1 - 3)

## 2024-03-04 NOTE — DISCHARGE NOTE NURSING/CASE MANAGEMENT/SOCIAL WORK - NSDCPEFALRISK_GEN_ALL_CORE
For information on Fall & Injury Prevention, visit: [email or web address] OR  [email or web address] OR  https://www.cdc.gov/steadi/patient.html

## 2024-03-04 NOTE — SWALLOW BEDSIDE ASSESSMENT ADULT - SWALLOW EVAL: DIAGNOSIS
Intact bolus processing. No perla clinical indicators of pharyngeal safety or efficiency deficits. Given recent impaction of chicken/blueberries, recommend modified solids. Education provided to patient and HHA re recommended texture.

## 2024-03-04 NOTE — PROGRESS NOTE ADULT - PROBLEM SELECTOR PLAN 9
F: s/p NS 125cc/hr for 8 hours, now on 100cc/hr for 10 hours  E: replete as needed  N: Clear Liquid  DVT ppx: SCD's given bleeding noted on EGD  GI ppx: PPI gtt and carafate    DISPO: [facility]
F: s/p NS 125cc/hr for 8 hours, now on 100cc/hr for 10 hours  E: replete as needed  N: NPO except meds   DVT ppx: SCD's given bleeding noted on EGD  GI ppx: PPI gtt and carafate    DISPO: [facility]

## 2024-03-04 NOTE — SWALLOW BEDSIDE ASSESSMENT ADULT - PHARYNGEAL PHASE
Laryngeal movement brisk per palpation. No cough, throat clear, or change in vocal quality post swallow.

## 2024-03-04 NOTE — PROGRESS NOTE ADULT - SUBJECTIVE AND OBJECTIVE BOX
GASTROENTEROLOGY PROGRESS NOTE  Patient seen and examined at bedside.  Tolerating clear clear liquid diet  Hypertensive overnight    PERTINENT REVIEW OF SYSTEMS:  CONSTITUTIONAL: No weakness, fevers or chills  HEENT: No visual changes; No vertigo or throat pain   GASTROINTESTINAL: As above.  NEUROLOGICAL: No numbness or weakness  SKIN: No itching, burning, rashes, or lesions     Allergies    No Known Allergies    Intolerances      MEDICATIONS:  MEDICATIONS  (STANDING):  albuterol/ipratropium for Nebulization 3 milliLiter(s) Nebulizer every 6 hours  artificial  tears Solution 1 Drop(s) Both EYES three times a day  benzocaine 20% Spray 1 Spray(s) Topical three times a day  cefTRIAXone   IVPB 1000 milliGRAM(s) IV Intermittent every 24 hours  melatonin 5 milliGRAM(s) Oral at bedtime  pantoprazole Infusion 8 mG/Hr (10 mL/Hr) IV Continuous <Continuous>  potassium chloride   Powder 20 milliEquivalent(s) Oral once  sodium chloride 0.9%. 1000 milliLiter(s) (100 mL/Hr) IV Continuous <Continuous>  sucralfate suspension 1 Gram(s) Oral every 6 hours    MEDICATIONS  (PRN):  acetaminophen     Tablet .. 650 milliGRAM(s) Oral every 6 hours PRN Temp greater or equal to 38C (100.4F), Mild Pain (1 - 3)    Vital Signs Last 24 Hrs  T(C): 36.5 (04 Mar 2024 08:57), Max: 36.8 (03 Mar 2024 13:26)  T(F): 97.7 (04 Mar 2024 08:57), Max: 98.3 (03 Mar 2024 13:26)  HR: 100 (04 Mar 2024 08:57) (86 - 100)  BP: 128/63 (04 Mar 2024 08:57) (128/63 - 191/90)  BP(mean): --  RR: 17 (04 Mar 2024 08:57) (17 - 20)  SpO2: 98% (04 Mar 2024 08:57) (87% - 100%)    Parameters below as of 04 Mar 2024 08:57  Patient On (Oxygen Delivery Method): nasal cannula  O2 Flow (L/min): 2      03-03 @ 07:01  -  03-04 @ 07:00  --------------------------------------------------------  IN: 250 mL / OUT: 0 mL / NET: 250 mL      PHYSICAL EXAM:    General: in no acute distress  HEENT: MMM, conjunctiva and sclera clear  Gastrointestinal: Soft non-tender non-distended; No rebound or guarding  Skin: Warm and dry. No obvious rash    LABS:                        9.3    5.13  )-----------( 183      ( 04 Mar 2024 05:30 )             28.4     03-04    135  |  102  |  40<H>  ----------------------------<  121<H>  3.3<L>   |  20<L>  |  2.02<H>    Ca    9.2      04 Mar 2024 05:30  Phos  3.4     03-04  Mg     1.9     03-04    TPro  6.7  /  Alb  3.3  /  TBili  0.3  /  DBili  x   /  AST  22  /  ALT  17  /  AlkPhos  103  03-04          Urinalysis Basic - ( 04 Mar 2024 05:30 )    Color: x / Appearance: x / SG: x / pH: x  Gluc: 121 mg/dL / Ketone: x  / Bili: x / Urobili: x   Blood: x / Protein: x / Nitrite: x   Leuk Esterase: x / RBC: x / WBC x   Sq Epi: x / Non Sq Epi: x / Bacteria: x                Culture - Urine (collected 02 Mar 2024 13:25)  Source: Clean Catch None  Final Report (04 Mar 2024 09:23):    >100,000 CFU/ml Proteus mirabilis  Organism: Proteus mirabilis (04 Mar 2024 09:23)  Organism: Proteus mirabilis (04 Mar 2024 09:23)    Urinalysis with Rflx Culture (collected 02 Mar 2024 13:25)      RADIOLOGY & ADDITIONAL STUDIES:  Reviewed

## 2024-03-04 NOTE — SWALLOW BEDSIDE ASSESSMENT ADULT - COMMENTS
FLETCHER, Angles, present who provided additional history. Pt and HHA reported occasional coughing with thin liquids or food. Denied hx of PNA or pulmonary compilations.   Pt known to this clinician, who evaluated the patient in June 2023 as a home health SLP. Evaluation was indicated at the time due to the inability to take multiple pills at a time. Eval revealed a functional oropharyngeal swallow. Pt was educated re safe swallow strategies, was recommended to continue a regular diet, and was not picked up for SLP services.

## 2024-03-04 NOTE — PROGRESS NOTE ADULT - PROBLEM SELECTOR PLAN 2
Patient with CKD 4, baseline Cr 1.5-1.7, follows up with Dr. Toro. Cr on admission 2.18. Etiology includes prerenal iso poor PO intake following esophageal food impaction, dry MM on exam. Patient started on NS 125cc/hr in the ED  - continue NS 100cc/hr for 10 hours   - trend Cr  - f/u with Dr. Toro

## 2024-03-05 PROCEDURE — 84466 ASSAY OF TRANSFERRIN: CPT

## 2024-03-05 PROCEDURE — 80048 BASIC METABOLIC PNL TOTAL CA: CPT

## 2024-03-05 PROCEDURE — 70490 CT SOFT TISSUE NECK W/O DYE: CPT | Mod: MC

## 2024-03-05 PROCEDURE — 80061 LIPID PANEL: CPT

## 2024-03-05 PROCEDURE — 83735 ASSAY OF MAGNESIUM: CPT

## 2024-03-05 PROCEDURE — 83605 ASSAY OF LACTIC ACID: CPT

## 2024-03-05 PROCEDURE — 74176 CT ABD & PELVIS W/O CONTRAST: CPT | Mod: MC

## 2024-03-05 PROCEDURE — 93005 ELECTROCARDIOGRAM TRACING: CPT

## 2024-03-05 PROCEDURE — 96374 THER/PROPH/DIAG INJ IV PUSH: CPT

## 2024-03-05 PROCEDURE — 86850 RBC ANTIBODY SCREEN: CPT

## 2024-03-05 PROCEDURE — 84100 ASSAY OF PHOSPHORUS: CPT

## 2024-03-05 PROCEDURE — 92610 EVALUATE SWALLOWING FUNCTION: CPT

## 2024-03-05 PROCEDURE — 86900 BLOOD TYPING SEROLOGIC ABO: CPT

## 2024-03-05 PROCEDURE — 99285 EMERGENCY DEPT VISIT HI MDM: CPT | Mod: 25

## 2024-03-05 PROCEDURE — 80076 HEPATIC FUNCTION PANEL: CPT

## 2024-03-05 PROCEDURE — 81001 URINALYSIS AUTO W/SCOPE: CPT

## 2024-03-05 PROCEDURE — 71045 X-RAY EXAM CHEST 1 VIEW: CPT

## 2024-03-05 PROCEDURE — 83036 HEMOGLOBIN GLYCOSYLATED A1C: CPT

## 2024-03-05 PROCEDURE — 87186 SC STD MICRODIL/AGAR DIL: CPT

## 2024-03-05 PROCEDURE — 83550 IRON BINDING TEST: CPT

## 2024-03-05 PROCEDURE — 86901 BLOOD TYPING SEROLOGIC RH(D): CPT

## 2024-03-05 PROCEDURE — 85025 COMPLETE CBC W/AUTO DIFF WBC: CPT

## 2024-03-05 PROCEDURE — 82728 ASSAY OF FERRITIN: CPT

## 2024-03-05 PROCEDURE — 85027 COMPLETE CBC AUTOMATED: CPT

## 2024-03-05 PROCEDURE — 96375 TX/PRO/DX INJ NEW DRUG ADDON: CPT

## 2024-03-05 PROCEDURE — 74220 X-RAY XM ESOPHAGUS 1CNTRST: CPT

## 2024-03-05 PROCEDURE — 83540 ASSAY OF IRON: CPT

## 2024-03-05 PROCEDURE — 82272 OCCULT BLD FECES 1-3 TESTS: CPT

## 2024-03-05 PROCEDURE — 94640 AIRWAY INHALATION TREATMENT: CPT

## 2024-03-05 PROCEDURE — 80053 COMPREHEN METABOLIC PANEL: CPT

## 2024-03-05 PROCEDURE — 82962 GLUCOSE BLOOD TEST: CPT

## 2024-03-05 PROCEDURE — 36415 COLL VENOUS BLD VENIPUNCTURE: CPT

## 2024-03-05 PROCEDURE — 97161 PT EVAL LOW COMPLEX 20 MIN: CPT

## 2024-03-05 PROCEDURE — 87086 URINE CULTURE/COLONY COUNT: CPT

## 2024-03-13 ENCOUNTER — APPOINTMENT (OUTPATIENT)
Dept: GASTROENTEROLOGY | Facility: CLINIC | Age: 89
End: 2024-03-13

## 2024-03-14 DIAGNOSIS — J44.9 CHRONIC OBSTRUCTIVE PULMONARY DISEASE, UNSPECIFIED: ICD-10-CM

## 2024-03-14 DIAGNOSIS — Z87.891 PERSONAL HISTORY OF NICOTINE DEPENDENCE: ICD-10-CM

## 2024-03-14 DIAGNOSIS — M48.54XA COLLAPSED VERTEBRA, NOT ELSEWHERE CLASSIFIED, THORACIC REGION, INITIAL ENCOUNTER FOR FRACTURE: ICD-10-CM

## 2024-03-14 DIAGNOSIS — N39.0 URINARY TRACT INFECTION, SITE NOT SPECIFIED: ICD-10-CM

## 2024-03-14 DIAGNOSIS — I12.9 HYPERTENSIVE CHRONIC KIDNEY DISEASE WITH STAGE 1 THROUGH STAGE 4 CHRONIC KIDNEY DISEASE, OR UNSPECIFIED CHRONIC KIDNEY DISEASE: ICD-10-CM

## 2024-03-14 DIAGNOSIS — J18.9 PNEUMONIA, UNSPECIFIED ORGANISM: ICD-10-CM

## 2024-03-14 DIAGNOSIS — E78.5 HYPERLIPIDEMIA, UNSPECIFIED: ICD-10-CM

## 2024-03-14 DIAGNOSIS — T18.128A FOOD IN ESOPHAGUS CAUSING OTHER INJURY, INITIAL ENCOUNTER: ICD-10-CM

## 2024-03-14 DIAGNOSIS — Z66 DO NOT RESUSCITATE: ICD-10-CM

## 2024-03-14 DIAGNOSIS — K22.4 DYSKINESIA OF ESOPHAGUS: ICD-10-CM

## 2024-03-14 DIAGNOSIS — K22.89 OTHER SPECIFIED DISEASE OF ESOPHAGUS: ICD-10-CM

## 2024-03-14 DIAGNOSIS — Z98.41 CATARACT EXTRACTION STATUS, RIGHT EYE: ICD-10-CM

## 2024-03-14 DIAGNOSIS — D63.1 ANEMIA IN CHRONIC KIDNEY DISEASE: ICD-10-CM

## 2024-03-14 DIAGNOSIS — K86.89 OTHER SPECIFIED DISEASES OF PANCREAS: ICD-10-CM

## 2024-03-14 DIAGNOSIS — N18.4 CHRONIC KIDNEY DISEASE, STAGE 4 (SEVERE): ICD-10-CM

## 2024-03-14 DIAGNOSIS — K21.9 GASTRO-ESOPHAGEAL REFLUX DISEASE WITHOUT ESOPHAGITIS: ICD-10-CM

## 2024-03-14 DIAGNOSIS — K91.89 OTHER POSTPROCEDURAL COMPLICATIONS AND DISORDERS OF DIGESTIVE SYSTEM: ICD-10-CM

## 2024-03-14 DIAGNOSIS — K22.11 ULCER OF ESOPHAGUS WITH BLEEDING: ICD-10-CM

## 2024-03-14 DIAGNOSIS — R71.0 PRECIPITOUS DROP IN HEMATOCRIT: ICD-10-CM
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
## 2024-03-14 DIAGNOSIS — N17.9 ACUTE KIDNEY FAILURE, UNSPECIFIED: ICD-10-CM
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
## 2024-03-14 DIAGNOSIS — Z85.820 PERSONAL HISTORY OF MALIGNANT MELANOMA OF SKIN: ICD-10-CM

## 2024-03-14 DIAGNOSIS — Z98.42 CATARACT EXTRACTION STATUS, LEFT EYE: ICD-10-CM

## 2024-04-19 ENCOUNTER — APPOINTMENT (OUTPATIENT)
Dept: NEPHROLOGY | Facility: CLINIC | Age: 89
End: 2024-04-19
Payer: MEDICARE

## 2024-04-19 VITALS — HEART RATE: 66 BPM | SYSTOLIC BLOOD PRESSURE: 178 MMHG | DIASTOLIC BLOOD PRESSURE: 88 MMHG | RESPIRATION RATE: 12 BRPM

## 2024-04-19 DIAGNOSIS — R39.9 UNSPECIFIED SYMPTOMS AND SIGNS INVOLVING THE GENITOURINARY SYSTEM: ICD-10-CM

## 2024-04-19 DIAGNOSIS — I12.9 HYPERTENSIVE CHRONIC KIDNEY DISEASE WITH STAGE 1 THROUGH STAGE 4 CHRONIC KIDNEY DISEASE, OR UNSPECIFIED CHRONIC KIDNEY DISEASE: ICD-10-CM

## 2024-04-19 DIAGNOSIS — I10 ESSENTIAL (PRIMARY) HYPERTENSION: ICD-10-CM

## 2024-04-19 DIAGNOSIS — R68.2 DRY MOUTH, UNSPECIFIED: ICD-10-CM

## 2024-04-19 DIAGNOSIS — N18.4 HYPERTENSIVE CHRONIC KIDNEY DISEASE WITH STAGE 1 THROUGH STAGE 4 CHRONIC KIDNEY DISEASE, OR UNSPECIFIED CHRONIC KIDNEY DISEASE: ICD-10-CM

## 2024-04-19 DIAGNOSIS — E83.52 HYPERCALCEMIA: ICD-10-CM

## 2024-04-19 DIAGNOSIS — F41.8 OTHER SPECIFIED ANXIETY DISORDERS: ICD-10-CM

## 2024-04-19 DIAGNOSIS — R13.10 DYSPHAGIA, UNSPECIFIED: ICD-10-CM

## 2024-04-19 PROCEDURE — 99215 OFFICE O/P EST HI 40 MIN: CPT

## 2024-04-19 PROCEDURE — G2211 COMPLEX E/M VISIT ADD ON: CPT

## 2024-04-19 RX ORDER — XYLITOL 550 MG
500 MUCO-ADHESIVE BUCCAL TABLET MUCOUS MEMBRANE
Qty: 2 | Refills: 0 | Status: ACTIVE | COMMUNITY
Start: 2024-04-19 | End: 1900-01-01

## 2024-04-20 NOTE — ASSESSMENT
[FreeTextEntry1] : Patient is an 89 yo F, HTN, former smoker with L Leg melanoma s/p tibial repair, IBS, PVD, who presents for CKD care  CKD - likely 2/2 aging and HTN, smoking history, brief workup negative except for proteinuria, dose medications appropriately for GFR  HTN - above goal in office today, will monitor for now and not over treat given age and frailty  Hypercalcemia - improved off supplements, continue to increase hydration  Anemia-CKD - likely some contribution of CKD, will check iron stores and replete as needed, consider EPO  RTC in 1-2 months to check hydration and BP

## 2024-04-20 NOTE — HISTORY OF PRESENT ILLNESS
[FreeTextEntry1] : Patient is an 89 yo F, HTN, former smoker with L Leg melanoma s/p tibial repair, IBS, PVD, who presents for CKD care  CKD 4 - stable sCr with labs drawn at home, but dry, elevated BUN. Will icnrease water intake  Had a choking episode on march4 (came to ED for food disempaction), since then has been having difficulty swallowing, will go back for repeat endoscopy 4/30  No nephrologic contraindication to GI procedure

## 2024-04-30 ENCOUNTER — OUTPATIENT (OUTPATIENT)
Dept: OUTPATIENT SERVICES | Facility: HOSPITAL | Age: 89
LOS: 1 days | Discharge: HOME | End: 2024-04-30
Payer: MEDICARE

## 2024-04-30 ENCOUNTER — TRANSCRIPTION ENCOUNTER (OUTPATIENT)
Age: 89
End: 2024-04-30

## 2024-04-30 VITALS — WEIGHT: 128.09 LBS | HEIGHT: 60 IN

## 2024-04-30 DIAGNOSIS — Z98.41 CATARACT EXTRACTION STATUS, RIGHT EYE: Chronic | ICD-10-CM

## 2024-04-30 DIAGNOSIS — K22.2 ESOPHAGEAL OBSTRUCTION: ICD-10-CM

## 2024-04-30 PROCEDURE — 43249 ESOPH EGD DILATION <30 MM: CPT

## 2024-04-30 PROCEDURE — C1726: CPT

## 2024-04-30 DEVICE — ESOPHAGEAL BALLOON CATH CRE FIXED WIRE 12-13.5-15MM: Type: IMPLANTABLE DEVICE | Status: FUNCTIONAL

## 2024-04-30 RX ORDER — OMEPRAZOLE 40 MG/1
40 CAPSULE, DELAYED RELEASE ORAL
Qty: 180 | Refills: 0 | Status: ACTIVE | COMMUNITY
Start: 2024-04-30 | End: 1900-01-01

## 2024-06-24 ENCOUNTER — RESULT REVIEW (OUTPATIENT)
Age: 89
End: 2024-06-24

## 2024-06-24 ENCOUNTER — INPATIENT (INPATIENT)
Facility: HOSPITAL | Age: 89
LOS: 3 days | Discharge: HOSPICE | DRG: 177 | End: 2024-06-28
Attending: STUDENT IN AN ORGANIZED HEALTH CARE EDUCATION/TRAINING PROGRAM | Admitting: INTERNAL MEDICINE
Payer: MEDICARE

## 2024-06-24 VITALS
SYSTOLIC BLOOD PRESSURE: 163 MMHG | OXYGEN SATURATION: 97 % | HEIGHT: 60 IN | TEMPERATURE: 98 F | WEIGHT: 119.05 LBS | DIASTOLIC BLOOD PRESSURE: 85 MMHG | RESPIRATION RATE: 18 BRPM | HEART RATE: 100 BPM

## 2024-06-24 DIAGNOSIS — R29.6 REPEATED FALLS: ICD-10-CM

## 2024-06-24 DIAGNOSIS — U07.1 COVID-19: ICD-10-CM

## 2024-06-24 DIAGNOSIS — N18.4 CHRONIC KIDNEY DISEASE, STAGE 4 (SEVERE): ICD-10-CM

## 2024-06-24 DIAGNOSIS — I50.9 HEART FAILURE, UNSPECIFIED: ICD-10-CM

## 2024-06-24 DIAGNOSIS — J44.9 CHRONIC OBSTRUCTIVE PULMONARY DISEASE, UNSPECIFIED: ICD-10-CM

## 2024-06-24 DIAGNOSIS — E78.5 HYPERLIPIDEMIA, UNSPECIFIED: ICD-10-CM

## 2024-06-24 DIAGNOSIS — Z78.9 OTHER SPECIFIED HEALTH STATUS: ICD-10-CM

## 2024-06-24 DIAGNOSIS — I10 ESSENTIAL (PRIMARY) HYPERTENSION: ICD-10-CM

## 2024-06-24 DIAGNOSIS — K22.4 DYSKINESIA OF ESOPHAGUS: ICD-10-CM

## 2024-06-24 DIAGNOSIS — Z98.41 CATARACT EXTRACTION STATUS, RIGHT EYE: Chronic | ICD-10-CM

## 2024-06-24 LAB
ADD ON TEST-SPECIMEN IN LAB: SIGNIFICANT CHANGE UP
ALBUMIN SERPL ELPH-MCNC: 3.7 G/DL — SIGNIFICANT CHANGE UP (ref 3.3–5)
ALBUMIN SERPL ELPH-MCNC: 3.8 G/DL — SIGNIFICANT CHANGE UP (ref 3.3–5)
ALP SERPL-CCNC: 137 U/L — HIGH (ref 40–120)
ALP SERPL-CCNC: 137 U/L — HIGH (ref 40–120)
ALT FLD-CCNC: 25 U/L — SIGNIFICANT CHANGE UP (ref 10–45)
ALT FLD-CCNC: 27 U/L — SIGNIFICANT CHANGE UP (ref 10–45)
ANION GAP SERPL CALC-SCNC: 12 MMOL/L — SIGNIFICANT CHANGE UP (ref 5–17)
ANION GAP SERPL CALC-SCNC: 13 MMOL/L — SIGNIFICANT CHANGE UP (ref 5–17)
ANION GAP SERPL CALC-SCNC: 15 MMOL/L — SIGNIFICANT CHANGE UP (ref 5–17)
APPEARANCE UR: ABNORMAL
APTT BLD: 27.8 SEC — SIGNIFICANT CHANGE UP (ref 24.5–35.6)
AST SERPL-CCNC: 32 U/L — SIGNIFICANT CHANGE UP (ref 10–40)
AST SERPL-CCNC: 37 U/L — SIGNIFICANT CHANGE UP (ref 10–40)
BACTERIA # UR AUTO: ABNORMAL /HPF
BASE EXCESS BLDA CALC-SCNC: -5.9 MMOL/L — LOW (ref -2–3)
BASE EXCESS BLDA CALC-SCNC: -6.6 MMOL/L — LOW (ref -2–3)
BASE EXCESS BLDV CALC-SCNC: -5.1 MMOL/L — LOW (ref -2–3)
BASOPHILS # BLD AUTO: 0 K/UL — SIGNIFICANT CHANGE UP (ref 0–0.2)
BASOPHILS # BLD AUTO: 0.01 K/UL — SIGNIFICANT CHANGE UP (ref 0–0.2)
BASOPHILS NFR BLD AUTO: 0 % — SIGNIFICANT CHANGE UP (ref 0–2)
BASOPHILS NFR BLD AUTO: 0.2 % — SIGNIFICANT CHANGE UP (ref 0–2)
BILIRUB DIRECT SERPL-MCNC: <0.2 MG/DL — SIGNIFICANT CHANGE UP (ref 0–0.3)
BILIRUB INDIRECT FLD-MCNC: SIGNIFICANT CHANGE UP MG/DL (ref 0.2–1)
BILIRUB SERPL-MCNC: 0.2 MG/DL — SIGNIFICANT CHANGE UP (ref 0.2–1.2)
BILIRUB SERPL-MCNC: 0.3 MG/DL — SIGNIFICANT CHANGE UP (ref 0.2–1.2)
BILIRUB UR-MCNC: NEGATIVE — SIGNIFICANT CHANGE UP
BUN SERPL-MCNC: 47 MG/DL — HIGH (ref 7–23)
BUN SERPL-MCNC: 50 MG/DL — HIGH (ref 7–23)
BUN SERPL-MCNC: 52 MG/DL — HIGH (ref 7–23)
CALCIUM SERPL-MCNC: 9.1 MG/DL — SIGNIFICANT CHANGE UP (ref 8.4–10.5)
CAST: 7 /LPF — HIGH (ref 0–4)
CHLORIDE SERPL-SCNC: 90 MMOL/L — LOW (ref 96–108)
CHLORIDE SERPL-SCNC: 93 MMOL/L — LOW (ref 96–108)
CHLORIDE SERPL-SCNC: 94 MMOL/L — LOW (ref 96–108)
CHOLEST SERPL-MCNC: 154 MG/DL — SIGNIFICANT CHANGE UP
CO2 BLDA-SCNC: 19 MMOL/L — SIGNIFICANT CHANGE UP (ref 19–24)
CO2 BLDA-SCNC: 20 MMOL/L — SIGNIFICANT CHANGE UP (ref 19–24)
CO2 BLDV-SCNC: 22.5 MMOL/L — SIGNIFICANT CHANGE UP (ref 22–26)
CO2 SERPL-SCNC: 16 MMOL/L — LOW (ref 22–31)
CO2 SERPL-SCNC: 18 MMOL/L — LOW (ref 22–31)
CO2 SERPL-SCNC: 20 MMOL/L — LOW (ref 22–31)
COLOR SPEC: YELLOW — SIGNIFICANT CHANGE UP
CREAT ?TM UR-MCNC: 19 MG/DL — SIGNIFICANT CHANGE UP
CREAT SERPL-MCNC: 2.45 MG/DL — HIGH (ref 0.5–1.3)
CREAT SERPL-MCNC: 2.48 MG/DL — HIGH (ref 0.5–1.3)
CREAT SERPL-MCNC: 2.54 MG/DL — HIGH (ref 0.5–1.3)
CRP SERPL-MCNC: 78 MG/L — HIGH (ref 0–4)
D DIMER BLD IA.RAPID-MCNC: 456 NG/ML DDU — HIGH
DIFF PNL FLD: NEGATIVE — SIGNIFICANT CHANGE UP
EGFR: 16 ML/MIN/1.73M2 — LOW
EGFR: 17 ML/MIN/1.73M2 — LOW
EGFR: 17 ML/MIN/1.73M2 — LOW
EOSINOPHIL # BLD AUTO: 0 K/UL — SIGNIFICANT CHANGE UP (ref 0–0.5)
EOSINOPHIL # BLD AUTO: 0.01 K/UL — SIGNIFICANT CHANGE UP (ref 0–0.5)
EOSINOPHIL NFR BLD AUTO: 0 % — SIGNIFICANT CHANGE UP (ref 0–6)
EOSINOPHIL NFR BLD AUTO: 0.2 % — SIGNIFICANT CHANGE UP (ref 0–6)
FERRITIN SERPL-MCNC: 98 NG/ML — SIGNIFICANT CHANGE UP (ref 13–330)
FINE GRAN CASTS #/AREA URNS AUTO: PRESENT
GAS PNL BLDV: SIGNIFICANT CHANGE UP
GLUCOSE BLDC GLUCOMTR-MCNC: 161 MG/DL — HIGH (ref 70–99)
GLUCOSE BLDC GLUCOMTR-MCNC: 180 MG/DL — HIGH (ref 70–99)
GLUCOSE SERPL-MCNC: 116 MG/DL — HIGH (ref 70–99)
GLUCOSE SERPL-MCNC: 155 MG/DL — HIGH (ref 70–99)
GLUCOSE SERPL-MCNC: 174 MG/DL — HIGH (ref 70–99)
GLUCOSE UR QL: NEGATIVE MG/DL — SIGNIFICANT CHANGE UP
HCO3 BLDA-SCNC: 18 MMOL/L — LOW (ref 21–28)
HCO3 BLDA-SCNC: 19 MMOL/L — LOW (ref 21–28)
HCO3 BLDV-SCNC: 21 MMOL/L — LOW (ref 22–29)
HCT VFR BLD CALC: 24 % — LOW (ref 34.5–45)
HCT VFR BLD CALC: 24.6 % — LOW (ref 34.5–45)
HDLC SERPL-MCNC: 90 MG/DL — SIGNIFICANT CHANGE UP
HGB BLD-MCNC: 8.1 G/DL — LOW (ref 11.5–15.5)
HGB BLD-MCNC: 8.1 G/DL — LOW (ref 11.5–15.5)
HOROWITZ INDEX BLDA+IHG-RTO: 40 — SIGNIFICANT CHANGE UP
IMM GRANULOCYTES NFR BLD AUTO: 0.2 % — SIGNIFICANT CHANGE UP (ref 0–0.9)
IMM GRANULOCYTES NFR BLD AUTO: 0.4 % — SIGNIFICANT CHANGE UP (ref 0–0.9)
INR BLD: 0.96 — SIGNIFICANT CHANGE UP (ref 0.85–1.18)
KETONES UR-MCNC: NEGATIVE MG/DL — SIGNIFICANT CHANGE UP
LACTATE SERPL-SCNC: 1 MMOL/L — SIGNIFICANT CHANGE UP (ref 0.5–2)
LACTATE SERPL-SCNC: 1.5 MMOL/L — SIGNIFICANT CHANGE UP (ref 0.5–2)
LDH SERPL L TO P-CCNC: 228 U/L — SIGNIFICANT CHANGE UP (ref 50–242)
LEGIONELLA AG UR QL: NEGATIVE — SIGNIFICANT CHANGE UP
LEUKOCYTE ESTERASE UR-ACNC: NEGATIVE — SIGNIFICANT CHANGE UP
LIPID PNL WITH DIRECT LDL SERPL: 54 MG/DL — SIGNIFICANT CHANGE UP
LYMPHOCYTES # BLD AUTO: 0.14 K/UL — LOW (ref 1–3.3)
LYMPHOCYTES # BLD AUTO: 0.42 K/UL — LOW (ref 1–3.3)
LYMPHOCYTES # BLD AUTO: 3 % — LOW (ref 13–44)
LYMPHOCYTES # BLD AUTO: 8.5 % — LOW (ref 13–44)
MAGNESIUM SERPL-MCNC: 1.6 MG/DL — SIGNIFICANT CHANGE UP (ref 1.6–2.6)
MAGNESIUM SERPL-MCNC: 1.6 MG/DL — SIGNIFICANT CHANGE UP (ref 1.6–2.6)
MCHC RBC-ENTMCNC: 28.7 PG — SIGNIFICANT CHANGE UP (ref 27–34)
MCHC RBC-ENTMCNC: 29.1 PG — SIGNIFICANT CHANGE UP (ref 27–34)
MCHC RBC-ENTMCNC: 32.9 GM/DL — SIGNIFICANT CHANGE UP (ref 32–36)
MCHC RBC-ENTMCNC: 33.8 GM/DL — SIGNIFICANT CHANGE UP (ref 32–36)
MCV RBC AUTO: 86.3 FL — SIGNIFICANT CHANGE UP (ref 80–100)
MCV RBC AUTO: 87.2 FL — SIGNIFICANT CHANGE UP (ref 80–100)
MONOCYTES # BLD AUTO: 0.04 K/UL — SIGNIFICANT CHANGE UP (ref 0–0.9)
MONOCYTES # BLD AUTO: 0.31 K/UL — SIGNIFICANT CHANGE UP (ref 0–0.9)
MONOCYTES NFR BLD AUTO: 0.9 % — LOW (ref 2–14)
MONOCYTES NFR BLD AUTO: 6.2 % — SIGNIFICANT CHANGE UP (ref 2–14)
MUCOUS THREADS # UR AUTO: PRESENT
NEUTROPHILS # BLD AUTO: 4.21 K/UL — SIGNIFICANT CHANGE UP (ref 1.8–7.4)
NEUTROPHILS # BLD AUTO: 4.46 K/UL — SIGNIFICANT CHANGE UP (ref 1.8–7.4)
NEUTROPHILS NFR BLD AUTO: 84.7 % — HIGH (ref 43–77)
NEUTROPHILS NFR BLD AUTO: 95.7 % — HIGH (ref 43–77)
NITRITE UR-MCNC: NEGATIVE — SIGNIFICANT CHANGE UP
NON HDL CHOLESTEROL: 64 MG/DL — SIGNIFICANT CHANGE UP
NRBC # BLD: 0 /100 WBCS — SIGNIFICANT CHANGE UP (ref 0–0)
NRBC # BLD: 0 /100 WBCS — SIGNIFICANT CHANGE UP (ref 0–0)
NT-PROBNP SERPL-SCNC: HIGH PG/ML (ref 0–300)
NT-PROBNP SERPL-SCNC: HIGH PG/ML (ref 0–300)
OSMOLALITY SERPL: 282 MOSM/KG — SIGNIFICANT CHANGE UP (ref 280–301)
OSMOLALITY UR: 275 MOSM/KG — LOW (ref 300–900)
OSMOLALITY UR: 284 MOSM/KG — LOW (ref 300–900)
PCO2 BLDA: 33 MMHG — SIGNIFICANT CHANGE UP (ref 32–45)
PCO2 BLDA: 34 MMHG — SIGNIFICANT CHANGE UP (ref 32–45)
PCO2 BLDV: 43 MMHG — HIGH (ref 39–42)
PH BLDA: 7.34 — LOW (ref 7.35–7.45)
PH BLDA: 7.36 — SIGNIFICANT CHANGE UP (ref 7.35–7.45)
PH BLDV: 7.3 — LOW (ref 7.32–7.43)
PH UR: 5.5 — SIGNIFICANT CHANGE UP (ref 5–8)
PHOSPHATE SERPL-MCNC: 4.5 MG/DL — SIGNIFICANT CHANGE UP (ref 2.5–4.5)
PLATELET # BLD AUTO: 186 K/UL — SIGNIFICANT CHANGE UP (ref 150–400)
PLATELET # BLD AUTO: 193 K/UL — SIGNIFICANT CHANGE UP (ref 150–400)
PO2 BLDA: 86 MMHG — SIGNIFICANT CHANGE UP (ref 83–108)
PO2 BLDA: 97 MMHG — SIGNIFICANT CHANGE UP (ref 83–108)
PO2 BLDV: 36 MMHG — SIGNIFICANT CHANGE UP (ref 25–45)
POTASSIUM SERPL-MCNC: 4 MMOL/L — SIGNIFICANT CHANGE UP (ref 3.5–5.3)
POTASSIUM SERPL-MCNC: 4.1 MMOL/L — SIGNIFICANT CHANGE UP (ref 3.5–5.3)
POTASSIUM SERPL-MCNC: 4.3 MMOL/L — SIGNIFICANT CHANGE UP (ref 3.5–5.3)
POTASSIUM SERPL-SCNC: 4 MMOL/L — SIGNIFICANT CHANGE UP (ref 3.5–5.3)
POTASSIUM SERPL-SCNC: 4.1 MMOL/L — SIGNIFICANT CHANGE UP (ref 3.5–5.3)
POTASSIUM SERPL-SCNC: 4.3 MMOL/L — SIGNIFICANT CHANGE UP (ref 3.5–5.3)
POTASSIUM UR-SCNC: 26 MMOL/L — SIGNIFICANT CHANGE UP
PROCALCITONIN SERPL-MCNC: 0.38 NG/ML — HIGH (ref 0.02–0.1)
PROT ?TM UR-MCNC: 116 MG/DL — HIGH (ref 0–12)
PROT SERPL-MCNC: 7.8 G/DL — SIGNIFICANT CHANGE UP (ref 6–8.3)
PROT SERPL-MCNC: 7.8 G/DL — SIGNIFICANT CHANGE UP (ref 6–8.3)
PROT UR-MCNC: 300 MG/DL
PROT/CREAT UR-RTO: 6.1 RATIO — HIGH (ref 0–0.2)
PROTHROM AB SERPL-ACNC: 11 SEC — SIGNIFICANT CHANGE UP (ref 9.5–13)
RAPID RVP RESULT: DETECTED
RBC # BLD: 2.78 M/UL — LOW (ref 3.8–5.2)
RBC # BLD: 2.82 M/UL — LOW (ref 3.8–5.2)
RBC # FLD: 14 % — SIGNIFICANT CHANGE UP (ref 10.3–14.5)
RBC # FLD: 14 % — SIGNIFICANT CHANGE UP (ref 10.3–14.5)
RBC CASTS # UR COMP ASSIST: 2 /HPF — SIGNIFICANT CHANGE UP (ref 0–4)
S PNEUM AG UR QL: NEGATIVE — SIGNIFICANT CHANGE UP
SAO2 % BLDA: 97.2 % — SIGNIFICANT CHANGE UP (ref 94–98)
SAO2 % BLDA: 97.3 % — SIGNIFICANT CHANGE UP (ref 94–98)
SAO2 % BLDV: 57.6 % — LOW (ref 67–88)
SARS-COV-2 RNA SPEC QL NAA+PROBE: DETECTED
SODIUM SERPL-SCNC: 122 MMOL/L — LOW (ref 135–145)
SODIUM SERPL-SCNC: 123 MMOL/L — LOW (ref 135–145)
SODIUM SERPL-SCNC: 126 MMOL/L — LOW (ref 135–145)
SODIUM UR-SCNC: 63 MMOL/L — SIGNIFICANT CHANGE UP
SODIUM UR-SCNC: 71 MMOL/L — SIGNIFICANT CHANGE UP
SP GR SPEC: 1.01 — SIGNIFICANT CHANGE UP (ref 1–1.03)
SQUAMOUS # UR AUTO: 3 /HPF — SIGNIFICANT CHANGE UP (ref 0–5)
T4 FREE SERPL-MCNC: 1.38 NG/DL — SIGNIFICANT CHANGE UP (ref 0.93–1.7)
TRIGL SERPL-MCNC: 47 MG/DL — SIGNIFICANT CHANGE UP
TSH SERPL-MCNC: 2.46 UIU/ML — SIGNIFICANT CHANGE UP (ref 0.27–4.2)
UROBILINOGEN FLD QL: 0.2 MG/DL — SIGNIFICANT CHANGE UP (ref 0.2–1)
UUN UR-MCNC: 215 MG/DL — SIGNIFICANT CHANGE UP
WBC # BLD: 4.66 K/UL — SIGNIFICANT CHANGE UP (ref 3.8–10.5)
WBC # BLD: 4.97 K/UL — SIGNIFICANT CHANGE UP (ref 3.8–10.5)
WBC # FLD AUTO: 4.66 K/UL — SIGNIFICANT CHANGE UP (ref 3.8–10.5)
WBC # FLD AUTO: 4.97 K/UL — SIGNIFICANT CHANGE UP (ref 3.8–10.5)
WBC UR QL: 6 /HPF — HIGH (ref 0–5)

## 2024-06-24 PROCEDURE — 93308 TTE F-UP OR LMTD: CPT | Mod: 26

## 2024-06-24 PROCEDURE — 99233 SBSQ HOSP IP/OBS HIGH 50: CPT | Mod: GC

## 2024-06-24 PROCEDURE — 99497 ADVNCD CARE PLAN 30 MIN: CPT | Mod: 25

## 2024-06-24 PROCEDURE — 99223 1ST HOSP IP/OBS HIGH 75: CPT

## 2024-06-24 PROCEDURE — 71045 X-RAY EXAM CHEST 1 VIEW: CPT | Mod: 26

## 2024-06-24 PROCEDURE — 93010 ELECTROCARDIOGRAM REPORT: CPT

## 2024-06-24 PROCEDURE — 99222 1ST HOSP IP/OBS MODERATE 55: CPT

## 2024-06-24 PROCEDURE — 99285 EMERGENCY DEPT VISIT HI MDM: CPT | Mod: FS

## 2024-06-24 RX ORDER — REMDESIVIR 5 MG/ML
INJECTION INTRAVENOUS
Refills: 0 | Status: DISCONTINUED | OUTPATIENT
Start: 2024-06-24 | End: 2024-06-28

## 2024-06-24 RX ORDER — REMDESIVIR 5 MG/ML
INJECTION INTRAVENOUS
Refills: 0 | Status: DISCONTINUED | OUTPATIENT
Start: 2024-06-24 | End: 2024-06-24

## 2024-06-24 RX ORDER — ESCITALOPRAM OXALATE 20 MG/1
10 TABLET, FILM COATED ORAL DAILY
Refills: 0 | Status: DISCONTINUED | OUTPATIENT
Start: 2024-06-24 | End: 2024-06-28

## 2024-06-24 RX ORDER — PANTOPRAZOLE SODIUM 40 MG/10ML
40 INJECTION, POWDER, FOR SOLUTION INTRAVENOUS
Refills: 0 | Status: DISCONTINUED | OUTPATIENT
Start: 2024-06-24 | End: 2024-06-24

## 2024-06-24 RX ORDER — ATORVASTATIN CALCIUM 20 MG/1
10 TABLET, FILM COATED ORAL AT BEDTIME
Refills: 0 | Status: DISCONTINUED | OUTPATIENT
Start: 2024-06-24 | End: 2024-06-28

## 2024-06-24 RX ORDER — PIPERACILLIN SODIUM AND TAZOBACTAM SODIUM 3; .375 G/15ML; G/15ML
3.38 INJECTION, POWDER, LYOPHILIZED, FOR SOLUTION INTRAVENOUS ONCE
Refills: 0 | Status: COMPLETED | OUTPATIENT
Start: 2024-06-24 | End: 2024-06-24

## 2024-06-24 RX ORDER — DEXTROSE MONOHYDRATE AND SODIUM CHLORIDE 5; .3 G/100ML; G/100ML
1000 INJECTION, SOLUTION INTRAVENOUS
Refills: 0 | Status: DISCONTINUED | OUTPATIENT
Start: 2024-06-24 | End: 2024-06-28

## 2024-06-24 RX ORDER — REMDESIVIR 5 MG/ML
100 INJECTION INTRAVENOUS EVERY 24 HOURS
Refills: 0 | Status: DISCONTINUED | OUTPATIENT
Start: 2024-06-25 | End: 2024-06-28

## 2024-06-24 RX ORDER — HYDRALAZINE HYDROCHLORIDE 50 MG/1
25 TABLET ORAL THREE TIMES A DAY
Refills: 0 | Status: DISCONTINUED | OUTPATIENT
Start: 2024-06-24 | End: 2024-06-24

## 2024-06-24 RX ORDER — CEFTRIAXONE SODIUM 500 MG
2000 VIAL (EA) INJECTION EVERY 24 HOURS
Refills: 0 | Status: COMPLETED | OUTPATIENT
Start: 2024-06-24 | End: 2024-06-27

## 2024-06-24 RX ORDER — AMLODIPINE BESYLATE 2.5 MG/1
5 TABLET ORAL EVERY 24 HOURS
Refills: 0 | Status: DISCONTINUED | OUTPATIENT
Start: 2024-06-24 | End: 2024-06-24

## 2024-06-24 RX ORDER — FUROSEMIDE 10 MG/ML
40 INJECTION, SOLUTION INTRAMUSCULAR; INTRAVENOUS ONCE
Refills: 0 | Status: COMPLETED | OUTPATIENT
Start: 2024-06-24 | End: 2024-06-24

## 2024-06-24 RX ORDER — IPRATROPIUM BROMIDE AND ALBUTEROL SULFATE .5; 3 MG/3ML; MG/3ML
3 SOLUTION RESPIRATORY (INHALATION) EVERY 6 HOURS
Refills: 0 | Status: DISCONTINUED | OUTPATIENT
Start: 2024-06-24 | End: 2024-06-28

## 2024-06-24 RX ORDER — REMDESIVIR 5 MG/ML
200 INJECTION INTRAVENOUS EVERY 24 HOURS
Refills: 0 | Status: DISCONTINUED | OUTPATIENT
Start: 2024-06-24 | End: 2024-06-24

## 2024-06-24 RX ORDER — LIPASE/PROTEASE/AMYLASE 4.5-25-20K
1 CAPSULE,DELAYED RELEASE (ENTERIC COATED) ORAL
Refills: 0 | Status: DISCONTINUED | OUTPATIENT
Start: 2024-06-24 | End: 2024-06-28

## 2024-06-24 RX ORDER — AZITHROMYCIN 250 MG/1
500 TABLET, FILM COATED ORAL ONCE
Refills: 0 | Status: DISCONTINUED | OUTPATIENT
Start: 2024-06-24 | End: 2024-06-24

## 2024-06-24 RX ORDER — DEXTROSE 30 % IN WATER 30 %
15 VIAL (ML) INTRAVENOUS ONCE
Refills: 0 | Status: DISCONTINUED | OUTPATIENT
Start: 2024-06-24 | End: 2024-06-28

## 2024-06-24 RX ORDER — DEXAMETHASONE 1 MG/1
6 TABLET ORAL EVERY 24 HOURS
Refills: 0 | Status: DISCONTINUED | OUTPATIENT
Start: 2024-06-25 | End: 2024-06-25

## 2024-06-24 RX ORDER — METHYLPREDNISOLONE ACETATE 20 MG/ML
125 VIAL (ML) INJECTION ONCE
Refills: 0 | Status: COMPLETED | OUTPATIENT
Start: 2024-06-24 | End: 2024-06-24

## 2024-06-24 RX ORDER — DEXAMETHASONE 1 MG/1
6 TABLET ORAL EVERY 24 HOURS
Refills: 0 | Status: DISCONTINUED | OUTPATIENT
Start: 2024-06-25 | End: 2024-06-28

## 2024-06-24 RX ORDER — LIPASE/PROTEASE/AMYLASE 4.5-25-20K
1 CAPSULE,DELAYED RELEASE (ENTERIC COATED) ORAL
Refills: 0 | Status: DISCONTINUED | OUTPATIENT
Start: 2024-06-24 | End: 2024-06-24

## 2024-06-24 RX ORDER — DEXAMETHASONE 1 MG/1
6 TABLET ORAL DAILY
Refills: 0 | Status: DISCONTINUED | OUTPATIENT
Start: 2024-06-24 | End: 2024-06-24

## 2024-06-24 RX ORDER — DEXTROSE 30 % IN WATER 30 %
12.5 VIAL (ML) INTRAVENOUS ONCE
Refills: 0 | Status: DISCONTINUED | OUTPATIENT
Start: 2024-06-24 | End: 2024-06-28

## 2024-06-24 RX ORDER — IPRATROPIUM BROMIDE AND ALBUTEROL SULFATE .5; 3 MG/3ML; MG/3ML
3 SOLUTION RESPIRATORY (INHALATION) EVERY 6 HOURS
Refills: 0 | Status: DISCONTINUED | OUTPATIENT
Start: 2024-06-24 | End: 2024-06-24

## 2024-06-24 RX ORDER — AMLODIPINE BESYLATE 2.5 MG/1
5 TABLET ORAL EVERY 24 HOURS
Refills: 0 | Status: DISCONTINUED | OUTPATIENT
Start: 2024-06-25 | End: 2024-06-28

## 2024-06-24 RX ORDER — INSULIN LISPRO 100 [IU]/ML
INJECTION, SOLUTION SUBCUTANEOUS AT BEDTIME
Refills: 0 | Status: DISCONTINUED | OUTPATIENT
Start: 2024-06-24 | End: 2024-06-28

## 2024-06-24 RX ORDER — PANTOPRAZOLE SODIUM 40 MG/10ML
40 INJECTION, POWDER, FOR SOLUTION INTRAVENOUS
Refills: 0 | Status: DISCONTINUED | OUTPATIENT
Start: 2024-06-24 | End: 2024-06-28

## 2024-06-24 RX ORDER — PIPERACILLIN SODIUM AND TAZOBACTAM SODIUM 3; .375 G/15ML; G/15ML
3.38 INJECTION, POWDER, LYOPHILIZED, FOR SOLUTION INTRAVENOUS ONCE
Refills: 0 | Status: DISCONTINUED | OUTPATIENT
Start: 2024-06-24 | End: 2024-06-24

## 2024-06-24 RX ORDER — MAGNESIUM SULFATE 100 %
2 POWDER (GRAM) MISCELLANEOUS ONCE
Refills: 0 | Status: COMPLETED | OUTPATIENT
Start: 2024-06-24 | End: 2024-06-24

## 2024-06-24 RX ORDER — REMDESIVIR 5 MG/ML
200 INJECTION INTRAVENOUS EVERY 24 HOURS
Refills: 0 | Status: COMPLETED | OUTPATIENT
Start: 2024-06-24 | End: 2024-06-24

## 2024-06-24 RX ORDER — AZITHROMYCIN 250 MG/1
500 TABLET, FILM COATED ORAL ONCE
Refills: 0 | Status: COMPLETED | OUTPATIENT
Start: 2024-06-24 | End: 2024-06-24

## 2024-06-24 RX ORDER — CEFTRIAXONE SODIUM 500 MG
1000 VIAL (EA) INJECTION ONCE
Refills: 0 | Status: COMPLETED | OUTPATIENT
Start: 2024-06-24 | End: 2024-06-24

## 2024-06-24 RX ORDER — AMLODIPINE BESYLATE 2.5 MG/1
5 TABLET ORAL DAILY
Refills: 0 | Status: DISCONTINUED | OUTPATIENT
Start: 2024-06-24 | End: 2024-06-24

## 2024-06-24 RX ORDER — DEXTROSE MONOHYDRATE 100 MG/ML
125 INJECTION, SOLUTION INTRAVENOUS ONCE
Refills: 0 | Status: DISCONTINUED | OUTPATIENT
Start: 2024-06-24 | End: 2024-06-28

## 2024-06-24 RX ORDER — GLUCAGON HYDROCHLORIDE 1 MG/ML
1 INJECTION, POWDER, FOR SOLUTION INTRAMUSCULAR; INTRAVENOUS; SUBCUTANEOUS ONCE
Refills: 0 | Status: DISCONTINUED | OUTPATIENT
Start: 2024-06-24 | End: 2024-06-28

## 2024-06-24 RX ORDER — DEXTROSE 30 % IN WATER 30 %
25 VIAL (ML) INTRAVENOUS ONCE
Refills: 0 | Status: DISCONTINUED | OUTPATIENT
Start: 2024-06-24 | End: 2024-06-28

## 2024-06-24 RX ORDER — HEPARIN SODIUM 50 [USP'U]/ML
5000 INJECTION, SOLUTION INTRAVENOUS EVERY 8 HOURS
Refills: 0 | Status: DISCONTINUED | OUTPATIENT
Start: 2024-06-24 | End: 2024-06-28

## 2024-06-24 RX ORDER — IPRATROPIUM BROMIDE AND ALBUTEROL SULFATE .5; 3 MG/3ML; MG/3ML
3 SOLUTION RESPIRATORY (INHALATION)
Refills: 0 | Status: COMPLETED | OUTPATIENT
Start: 2024-06-24 | End: 2024-06-24

## 2024-06-24 RX ORDER — INSULIN LISPRO 100 [IU]/ML
INJECTION, SOLUTION SUBCUTANEOUS
Refills: 0 | Status: DISCONTINUED | OUTPATIENT
Start: 2024-06-24 | End: 2024-06-28

## 2024-06-24 RX ORDER — AZITHROMYCIN 250 MG/1
TABLET, FILM COATED ORAL
Refills: 0 | Status: DISCONTINUED | OUTPATIENT
Start: 2024-06-24 | End: 2024-06-24

## 2024-06-24 RX ADMIN — Medication 25 GRAM(S): at 18:09

## 2024-06-24 RX ADMIN — IPRATROPIUM BROMIDE AND ALBUTEROL SULFATE 3 MILLILITER(S): .5; 3 SOLUTION RESPIRATORY (INHALATION) at 04:01

## 2024-06-24 RX ADMIN — IPRATROPIUM BROMIDE AND ALBUTEROL SULFATE 3 MILLILITER(S): .5; 3 SOLUTION RESPIRATORY (INHALATION) at 04:46

## 2024-06-24 RX ADMIN — HEPARIN SODIUM 5000 UNIT(S): 50 INJECTION, SOLUTION INTRAVENOUS at 21:53

## 2024-06-24 RX ADMIN — Medication 1 CAPSULE(S): at 10:07

## 2024-06-24 RX ADMIN — REMDESIVIR 200 MILLIGRAM(S): 5 INJECTION INTRAVENOUS at 20:00

## 2024-06-24 RX ADMIN — IPRATROPIUM BROMIDE AND ALBUTEROL SULFATE 3 MILLILITER(S): .5; 3 SOLUTION RESPIRATORY (INHALATION) at 21:51

## 2024-06-24 RX ADMIN — Medication 100 MILLIGRAM(S): at 15:07

## 2024-06-24 RX ADMIN — PIPERACILLIN SODIUM AND TAZOBACTAM SODIUM 200 GRAM(S): 3; .375 INJECTION, POWDER, LYOPHILIZED, FOR SOLUTION INTRAVENOUS at 07:48

## 2024-06-24 RX ADMIN — IPRATROPIUM BROMIDE AND ALBUTEROL SULFATE 3 MILLILITER(S): .5; 3 SOLUTION RESPIRATORY (INHALATION) at 04:15

## 2024-06-24 RX ADMIN — Medication 1 MILLIGRAM(S): at 22:18

## 2024-06-24 RX ADMIN — IPRATROPIUM BROMIDE AND ALBUTEROL SULFATE 3 MILLILITER(S): .5; 3 SOLUTION RESPIRATORY (INHALATION) at 10:07

## 2024-06-24 RX ADMIN — IPRATROPIUM BROMIDE AND ALBUTEROL SULFATE 3 MILLILITER(S): .5; 3 SOLUTION RESPIRATORY (INHALATION) at 18:02

## 2024-06-24 RX ADMIN — Medication 1000 MILLIGRAM(S): at 04:49

## 2024-06-24 RX ADMIN — ESCITALOPRAM OXALATE 10 MILLIGRAM(S): 20 TABLET, FILM COATED ORAL at 12:14

## 2024-06-24 RX ADMIN — IPRATROPIUM BROMIDE AND ALBUTEROL SULFATE 3 MILLILITER(S): .5; 3 SOLUTION RESPIRATORY (INHALATION) at 14:55

## 2024-06-24 RX ADMIN — PANTOPRAZOLE SODIUM 40 MILLIGRAM(S): 40 INJECTION, POWDER, FOR SOLUTION INTRAVENOUS at 18:01

## 2024-06-24 RX ADMIN — HEPARIN SODIUM 5000 UNIT(S): 50 INJECTION, SOLUTION INTRAVENOUS at 15:07

## 2024-06-24 RX ADMIN — ATORVASTATIN CALCIUM 10 MILLIGRAM(S): 20 TABLET, FILM COATED ORAL at 21:54

## 2024-06-24 RX ADMIN — AZITHROMYCIN 255 MILLIGRAM(S): 250 TABLET, FILM COATED ORAL at 04:43

## 2024-06-24 RX ADMIN — Medication 100 MILLIGRAM(S): at 04:42

## 2024-06-24 RX ADMIN — FUROSEMIDE 40 MILLIGRAM(S): 10 INJECTION, SOLUTION INTRAMUSCULAR; INTRAVENOUS at 05:01

## 2024-06-24 RX ADMIN — Medication 125 MILLIGRAM(S): at 04:01

## 2024-06-24 RX ADMIN — FUROSEMIDE 40 MILLIGRAM(S): 10 INJECTION, SOLUTION INTRAMUSCULAR; INTRAVENOUS at 10:07

## 2024-06-24 RX ADMIN — AMLODIPINE BESYLATE 5 MILLIGRAM(S): 2.5 TABLET ORAL at 10:56

## 2024-06-25 ENCOUNTER — RESULT REVIEW (OUTPATIENT)
Age: 89
End: 2024-06-25

## 2024-06-25 LAB
A1C WITH ESTIMATED AVERAGE GLUCOSE RESULT: 5.1 % — SIGNIFICANT CHANGE UP (ref 4–5.6)
ANION GAP SERPL CALC-SCNC: 15 MMOL/L — SIGNIFICANT CHANGE UP (ref 5–17)
BASOPHILS # BLD AUTO: 0 K/UL — SIGNIFICANT CHANGE UP (ref 0–0.2)
BASOPHILS NFR BLD AUTO: 0 % — SIGNIFICANT CHANGE UP (ref 0–2)
BLD GP AB SCN SERPL QL: NEGATIVE — SIGNIFICANT CHANGE UP
BUN SERPL-MCNC: 56 MG/DL — HIGH (ref 7–23)
CALCIUM SERPL-MCNC: 9 MG/DL — SIGNIFICANT CHANGE UP (ref 8.4–10.5)
CHLORIDE SERPL-SCNC: 91 MMOL/L — LOW (ref 96–108)
CO2 SERPL-SCNC: 19 MMOL/L — LOW (ref 22–31)
CREAT SERPL-MCNC: 2.69 MG/DL — HIGH (ref 0.5–1.3)
EGFR: 15 ML/MIN/1.73M2 — LOW
EOSINOPHIL # BLD AUTO: 0 K/UL — SIGNIFICANT CHANGE UP (ref 0–0.5)
EOSINOPHIL NFR BLD AUTO: 0 % — SIGNIFICANT CHANGE UP (ref 0–6)
ESTIMATED AVERAGE GLUCOSE: 100 MG/DL — SIGNIFICANT CHANGE UP (ref 68–114)
GLUCOSE BLDC GLUCOMTR-MCNC: 129 MG/DL — HIGH (ref 70–99)
GLUCOSE BLDC GLUCOMTR-MCNC: 136 MG/DL — HIGH (ref 70–99)
GLUCOSE BLDC GLUCOMTR-MCNC: 158 MG/DL — HIGH (ref 70–99)
GLUCOSE BLDC GLUCOMTR-MCNC: 93 MG/DL — SIGNIFICANT CHANGE UP (ref 70–99)
GLUCOSE SERPL-MCNC: 116 MG/DL — HIGH (ref 70–99)
HCT VFR BLD CALC: 22.9 % — LOW (ref 34.5–45)
HGB BLD-MCNC: 7.8 G/DL — LOW (ref 11.5–15.5)
IMM GRANULOCYTES NFR BLD AUTO: 0.3 % — SIGNIFICANT CHANGE UP (ref 0–0.9)
LYMPHOCYTES # BLD AUTO: 0.26 K/UL — LOW (ref 1–3.3)
LYMPHOCYTES # BLD AUTO: 3.8 % — LOW (ref 13–44)
MAGNESIUM SERPL-MCNC: 2.1 MG/DL — SIGNIFICANT CHANGE UP (ref 1.6–2.6)
MCHC RBC-ENTMCNC: 29.1 PG — SIGNIFICANT CHANGE UP (ref 27–34)
MCHC RBC-ENTMCNC: 34.1 GM/DL — SIGNIFICANT CHANGE UP (ref 32–36)
MCV RBC AUTO: 85.4 FL — SIGNIFICANT CHANGE UP (ref 80–100)
MONOCYTES # BLD AUTO: 0.19 K/UL — SIGNIFICANT CHANGE UP (ref 0–0.9)
MONOCYTES NFR BLD AUTO: 2.8 % — SIGNIFICANT CHANGE UP (ref 2–14)
NEUTROPHILS # BLD AUTO: 6.43 K/UL — SIGNIFICANT CHANGE UP (ref 1.8–7.4)
NEUTROPHILS NFR BLD AUTO: 93.1 % — HIGH (ref 43–77)
NRBC # BLD: 0 /100 WBCS — SIGNIFICANT CHANGE UP (ref 0–0)
PHOSPHATE SERPL-MCNC: 5.3 MG/DL — HIGH (ref 2.5–4.5)
PLATELET # BLD AUTO: 188 K/UL — SIGNIFICANT CHANGE UP (ref 150–400)
POTASSIUM SERPL-MCNC: 4 MMOL/L — SIGNIFICANT CHANGE UP (ref 3.5–5.3)
POTASSIUM SERPL-SCNC: 4 MMOL/L — SIGNIFICANT CHANGE UP (ref 3.5–5.3)
RBC # BLD: 2.68 M/UL — LOW (ref 3.8–5.2)
RBC # FLD: 14 % — SIGNIFICANT CHANGE UP (ref 10.3–14.5)
RH IG SCN BLD-IMP: POSITIVE — SIGNIFICANT CHANGE UP
SODIUM SERPL-SCNC: 125 MMOL/L — LOW (ref 135–145)
WBC # BLD: 6.9 K/UL — SIGNIFICANT CHANGE UP (ref 3.8–10.5)
WBC # FLD AUTO: 6.9 K/UL — SIGNIFICANT CHANGE UP (ref 3.8–10.5)

## 2024-06-25 PROCEDURE — 99221 1ST HOSP IP/OBS SF/LOW 40: CPT

## 2024-06-25 PROCEDURE — 99232 SBSQ HOSP IP/OBS MODERATE 35: CPT

## 2024-06-25 PROCEDURE — 93306 TTE W/DOPPLER COMPLETE: CPT | Mod: 26

## 2024-06-25 PROCEDURE — 99233 SBSQ HOSP IP/OBS HIGH 50: CPT | Mod: GC

## 2024-06-25 RX ORDER — FUROSEMIDE 10 MG/ML
40 INJECTION, SOLUTION INTRAMUSCULAR; INTRAVENOUS ONCE
Refills: 0 | Status: COMPLETED | OUTPATIENT
Start: 2024-06-25 | End: 2024-06-25

## 2024-06-25 RX ADMIN — INSULIN LISPRO 2: 100 INJECTION, SOLUTION SUBCUTANEOUS at 12:10

## 2024-06-25 RX ADMIN — ATORVASTATIN CALCIUM 10 MILLIGRAM(S): 20 TABLET, FILM COATED ORAL at 22:09

## 2024-06-25 RX ADMIN — PANTOPRAZOLE SODIUM 40 MILLIGRAM(S): 40 INJECTION, POWDER, FOR SOLUTION INTRAVENOUS at 17:50

## 2024-06-25 RX ADMIN — FUROSEMIDE 40 MILLIGRAM(S): 10 INJECTION, SOLUTION INTRAMUSCULAR; INTRAVENOUS at 13:17

## 2024-06-25 RX ADMIN — HEPARIN SODIUM 5000 UNIT(S): 50 INJECTION, SOLUTION INTRAVENOUS at 14:11

## 2024-06-25 RX ADMIN — ESCITALOPRAM OXALATE 10 MILLIGRAM(S): 20 TABLET, FILM COATED ORAL at 12:18

## 2024-06-25 RX ADMIN — AMLODIPINE BESYLATE 5 MILLIGRAM(S): 2.5 TABLET ORAL at 06:09

## 2024-06-25 RX ADMIN — Medication 3 MILLIGRAM(S): at 23:06

## 2024-06-25 RX ADMIN — Medication 1 MILLIGRAM(S): at 22:09

## 2024-06-25 RX ADMIN — DEXAMETHASONE 6 MILLIGRAM(S): 1 TABLET ORAL at 06:09

## 2024-06-25 RX ADMIN — Medication 100 MILLIGRAM(S): at 14:11

## 2024-06-25 RX ADMIN — HEPARIN SODIUM 5000 UNIT(S): 50 INJECTION, SOLUTION INTRAVENOUS at 06:08

## 2024-06-25 RX ADMIN — PANTOPRAZOLE SODIUM 40 MILLIGRAM(S): 40 INJECTION, POWDER, FOR SOLUTION INTRAVENOUS at 06:08

## 2024-06-25 RX ADMIN — REMDESIVIR 200 MILLIGRAM(S): 5 INJECTION INTRAVENOUS at 17:44

## 2024-06-25 RX ADMIN — IPRATROPIUM BROMIDE AND ALBUTEROL SULFATE 3 MILLILITER(S): .5; 3 SOLUTION RESPIRATORY (INHALATION) at 10:17

## 2024-06-25 RX ADMIN — Medication 1 CAPSULE(S): at 07:39

## 2024-06-25 RX ADMIN — IPRATROPIUM BROMIDE AND ALBUTEROL SULFATE 3 MILLILITER(S): .5; 3 SOLUTION RESPIRATORY (INHALATION) at 06:08

## 2024-06-25 RX ADMIN — HEPARIN SODIUM 5000 UNIT(S): 50 INJECTION, SOLUTION INTRAVENOUS at 22:09

## 2024-06-25 RX ADMIN — IPRATROPIUM BROMIDE AND ALBUTEROL SULFATE 3 MILLILITER(S): .5; 3 SOLUTION RESPIRATORY (INHALATION) at 16:41

## 2024-06-25 RX ADMIN — IPRATROPIUM BROMIDE AND ALBUTEROL SULFATE 3 MILLILITER(S): .5; 3 SOLUTION RESPIRATORY (INHALATION) at 22:08

## 2024-06-26 DIAGNOSIS — U07.1 COVID-19: ICD-10-CM

## 2024-06-26 DIAGNOSIS — N17.0 ACUTE KIDNEY FAILURE WITH TUBULAR NECROSIS: ICD-10-CM

## 2024-06-26 DIAGNOSIS — R13.10 DYSPHAGIA, UNSPECIFIED: ICD-10-CM

## 2024-06-26 DIAGNOSIS — K80.20 CALCULUS OF GALLBLADDER WITHOUT CHOLECYSTITIS WITHOUT OBSTRUCTION: ICD-10-CM

## 2024-06-26 DIAGNOSIS — E87.1 HYPO-OSMOLALITY AND HYPONATREMIA: ICD-10-CM

## 2024-06-26 DIAGNOSIS — I10 ESSENTIAL (PRIMARY) HYPERTENSION: ICD-10-CM

## 2024-06-26 DIAGNOSIS — I50.30 UNSPECIFIED DIASTOLIC (CONGESTIVE) HEART FAILURE: ICD-10-CM

## 2024-06-26 DIAGNOSIS — R63.8 OTHER SYMPTOMS AND SIGNS CONCERNING FOOD AND FLUID INTAKE: ICD-10-CM

## 2024-06-26 DIAGNOSIS — J96.01 ACUTE RESPIRATORY FAILURE WITH HYPOXIA: ICD-10-CM

## 2024-06-26 LAB
ANION GAP SERPL CALC-SCNC: 15 MMOL/L — SIGNIFICANT CHANGE UP (ref 5–17)
BASOPHILS # BLD AUTO: 0 K/UL — SIGNIFICANT CHANGE UP (ref 0–0.2)
BASOPHILS NFR BLD AUTO: 0 % — SIGNIFICANT CHANGE UP (ref 0–2)
BLD GP AB SCN SERPL QL: NEGATIVE — SIGNIFICANT CHANGE UP
BUN SERPL-MCNC: 64 MG/DL — HIGH (ref 7–23)
CALCIUM SERPL-MCNC: 8.9 MG/DL — SIGNIFICANT CHANGE UP (ref 8.4–10.5)
CHLORIDE SERPL-SCNC: 91 MMOL/L — LOW (ref 96–108)
CO2 SERPL-SCNC: 19 MMOL/L — LOW (ref 22–31)
CREAT SERPL-MCNC: 2.72 MG/DL — HIGH (ref 0.5–1.3)
EGFR: 15 ML/MIN/1.73M2 — LOW
EOSINOPHIL # BLD AUTO: 0 K/UL — SIGNIFICANT CHANGE UP (ref 0–0.5)
EOSINOPHIL NFR BLD AUTO: 0 % — SIGNIFICANT CHANGE UP (ref 0–6)
GLUCOSE BLDC GLUCOMTR-MCNC: 100 MG/DL — HIGH (ref 70–99)
GLUCOSE BLDC GLUCOMTR-MCNC: 119 MG/DL — HIGH (ref 70–99)
GLUCOSE BLDC GLUCOMTR-MCNC: 144 MG/DL — HIGH (ref 70–99)
GLUCOSE BLDC GLUCOMTR-MCNC: 145 MG/DL — HIGH (ref 70–99)
GLUCOSE SERPL-MCNC: 97 MG/DL — SIGNIFICANT CHANGE UP (ref 70–99)
HCT VFR BLD CALC: 25.4 % — LOW (ref 34.5–45)
HGB BLD-MCNC: 8 G/DL — LOW (ref 11.5–15.5)
IL6 FLD-MCNC: 20.8 PG/ML — HIGH (ref 0–13)
IMM GRANULOCYTES NFR BLD AUTO: 0.4 % — SIGNIFICANT CHANGE UP (ref 0–0.9)
LYMPHOCYTES # BLD AUTO: 0.32 K/UL — LOW (ref 1–3.3)
LYMPHOCYTES # BLD AUTO: 6.7 % — LOW (ref 13–44)
MAGNESIUM SERPL-MCNC: 2.1 MG/DL — SIGNIFICANT CHANGE UP (ref 1.6–2.6)
MCHC RBC-ENTMCNC: 28.5 PG — SIGNIFICANT CHANGE UP (ref 27–34)
MCHC RBC-ENTMCNC: 31.5 GM/DL — LOW (ref 32–36)
MCV RBC AUTO: 90.4 FL — SIGNIFICANT CHANGE UP (ref 80–100)
MONOCYTES # BLD AUTO: 0.14 K/UL — SIGNIFICANT CHANGE UP (ref 0–0.9)
MONOCYTES NFR BLD AUTO: 2.9 % — SIGNIFICANT CHANGE UP (ref 2–14)
NEUTROPHILS # BLD AUTO: 4.33 K/UL — SIGNIFICANT CHANGE UP (ref 1.8–7.4)
NEUTROPHILS NFR BLD AUTO: 90 % — HIGH (ref 43–77)
NRBC # BLD: 0 /100 WBCS — SIGNIFICANT CHANGE UP (ref 0–0)
PHOSPHATE SERPL-MCNC: 5.2 MG/DL — HIGH (ref 2.5–4.5)
PLATELET # BLD AUTO: 202 K/UL — SIGNIFICANT CHANGE UP (ref 150–400)
POTASSIUM SERPL-MCNC: 4.1 MMOL/L — SIGNIFICANT CHANGE UP (ref 3.5–5.3)
POTASSIUM SERPL-SCNC: 4.1 MMOL/L — SIGNIFICANT CHANGE UP (ref 3.5–5.3)
RBC # BLD: 2.81 M/UL — LOW (ref 3.8–5.2)
RBC # FLD: 13.9 % — SIGNIFICANT CHANGE UP (ref 10.3–14.5)
RH IG SCN BLD-IMP: POSITIVE — SIGNIFICANT CHANGE UP
SODIUM SERPL-SCNC: 125 MMOL/L — LOW (ref 135–145)
WBC # BLD: 4.81 K/UL — SIGNIFICANT CHANGE UP (ref 3.8–10.5)
WBC # FLD AUTO: 4.81 K/UL — SIGNIFICANT CHANGE UP (ref 3.8–10.5)

## 2024-06-26 PROCEDURE — 99497 ADVNCD CARE PLAN 30 MIN: CPT | Mod: 25

## 2024-06-26 PROCEDURE — 99223 1ST HOSP IP/OBS HIGH 75: CPT | Mod: GC

## 2024-06-26 PROCEDURE — 99232 SBSQ HOSP IP/OBS MODERATE 35: CPT | Mod: GC

## 2024-06-26 PROCEDURE — 99223 1ST HOSP IP/OBS HIGH 75: CPT

## 2024-06-26 PROCEDURE — 99232 SBSQ HOSP IP/OBS MODERATE 35: CPT

## 2024-06-26 RX ORDER — ACETAMINOPHEN 325 MG
650 TABLET ORAL EVERY 6 HOURS
Refills: 0 | Status: DISCONTINUED | OUTPATIENT
Start: 2024-06-26 | End: 2024-06-28

## 2024-06-26 RX ADMIN — Medication 100 MILLIGRAM(S): at 14:00

## 2024-06-26 RX ADMIN — ESCITALOPRAM OXALATE 10 MILLIGRAM(S): 20 TABLET, FILM COATED ORAL at 11:35

## 2024-06-26 RX ADMIN — HEPARIN SODIUM 5000 UNIT(S): 50 INJECTION, SOLUTION INTRAVENOUS at 22:17

## 2024-06-26 RX ADMIN — IPRATROPIUM BROMIDE AND ALBUTEROL SULFATE 3 MILLILITER(S): .5; 3 SOLUTION RESPIRATORY (INHALATION) at 16:24

## 2024-06-26 RX ADMIN — IPRATROPIUM BROMIDE AND ALBUTEROL SULFATE 3 MILLILITER(S): .5; 3 SOLUTION RESPIRATORY (INHALATION) at 05:52

## 2024-06-26 RX ADMIN — IPRATROPIUM BROMIDE AND ALBUTEROL SULFATE 3 MILLILITER(S): .5; 3 SOLUTION RESPIRATORY (INHALATION) at 09:38

## 2024-06-26 RX ADMIN — DEXAMETHASONE 6 MILLIGRAM(S): 1 TABLET ORAL at 05:53

## 2024-06-26 RX ADMIN — PANTOPRAZOLE SODIUM 40 MILLIGRAM(S): 40 INJECTION, POWDER, FOR SOLUTION INTRAVENOUS at 17:28

## 2024-06-26 RX ADMIN — HEPARIN SODIUM 5000 UNIT(S): 50 INJECTION, SOLUTION INTRAVENOUS at 13:58

## 2024-06-26 RX ADMIN — IPRATROPIUM BROMIDE AND ALBUTEROL SULFATE 3 MILLILITER(S): .5; 3 SOLUTION RESPIRATORY (INHALATION) at 22:17

## 2024-06-26 RX ADMIN — Medication 650 MILLIGRAM(S): at 01:10

## 2024-06-26 RX ADMIN — Medication 3 MILLIGRAM(S): at 22:17

## 2024-06-26 RX ADMIN — PANTOPRAZOLE SODIUM 40 MILLIGRAM(S): 40 INJECTION, POWDER, FOR SOLUTION INTRAVENOUS at 05:53

## 2024-06-26 RX ADMIN — AMLODIPINE BESYLATE 5 MILLIGRAM(S): 2.5 TABLET ORAL at 05:53

## 2024-06-26 RX ADMIN — HEPARIN SODIUM 5000 UNIT(S): 50 INJECTION, SOLUTION INTRAVENOUS at 05:53

## 2024-06-26 RX ADMIN — ATORVASTATIN CALCIUM 10 MILLIGRAM(S): 20 TABLET, FILM COATED ORAL at 22:18

## 2024-06-26 RX ADMIN — Medication 650 MILLIGRAM(S): at 00:25

## 2024-06-26 RX ADMIN — REMDESIVIR 200 MILLIGRAM(S): 5 INJECTION INTRAVENOUS at 18:24

## 2024-06-26 RX ADMIN — Medication 1 CAPSULE(S): at 09:38

## 2024-06-27 ENCOUNTER — TRANSCRIPTION ENCOUNTER (OUTPATIENT)
Age: 89
End: 2024-06-27

## 2024-06-27 DIAGNOSIS — U07.1 COVID-19: ICD-10-CM

## 2024-06-27 DIAGNOSIS — J15.69 PNEUMONIA DUE TO OTHER GRAM-NEGATIVE BACTERIA: ICD-10-CM

## 2024-06-27 DIAGNOSIS — E87.29 OTHER ACIDOSIS: ICD-10-CM

## 2024-06-27 DIAGNOSIS — Z71.89 OTHER SPECIFIED COUNSELING: ICD-10-CM

## 2024-06-27 DIAGNOSIS — R53.81 OTHER MALAISE: ICD-10-CM

## 2024-06-27 DIAGNOSIS — I50.33 ACUTE ON CHRONIC DIASTOLIC (CONGESTIVE) HEART FAILURE: ICD-10-CM

## 2024-06-27 DIAGNOSIS — K22.4 DYSKINESIA OF ESOPHAGUS: ICD-10-CM

## 2024-06-27 DIAGNOSIS — Z51.5 ENCOUNTER FOR PALLIATIVE CARE: ICD-10-CM

## 2024-06-27 LAB
ANION GAP SERPL CALC-SCNC: 18 MMOL/L — HIGH (ref 5–17)
ANISOCYTOSIS BLD QL: SLIGHT — SIGNIFICANT CHANGE UP
APPEARANCE UR: ABNORMAL
BACTERIA # UR AUTO: ABNORMAL /HPF
BASOPHILS # BLD AUTO: 0 K/UL — SIGNIFICANT CHANGE UP (ref 0–0.2)
BASOPHILS NFR BLD AUTO: 0 % — SIGNIFICANT CHANGE UP (ref 0–2)
BILIRUB UR-MCNC: NEGATIVE — SIGNIFICANT CHANGE UP
BUN SERPL-MCNC: 73 MG/DL — HIGH (ref 7–23)
BURR CELLS BLD QL SMEAR: PRESENT — SIGNIFICANT CHANGE UP
CALCIUM SERPL-MCNC: 9 MG/DL — SIGNIFICANT CHANGE UP (ref 8.4–10.5)
CAST: 4 /LPF — SIGNIFICANT CHANGE UP (ref 0–4)
CHLORIDE SERPL-SCNC: 92 MMOL/L — LOW (ref 96–108)
CO2 SERPL-SCNC: 17 MMOL/L — LOW (ref 22–31)
COLOR SPEC: YELLOW — SIGNIFICANT CHANGE UP
CREAT ?TM UR-MCNC: 59 MG/DL — SIGNIFICANT CHANGE UP
CREAT SERPL-MCNC: 2.8 MG/DL — HIGH (ref 0.5–1.3)
DACRYOCYTES BLD QL SMEAR: SLIGHT — SIGNIFICANT CHANGE UP
DIFF PNL FLD: ABNORMAL
EGFR: 15 ML/MIN/1.73M2 — LOW
EOSINOPHIL # BLD AUTO: 0 K/UL — SIGNIFICANT CHANGE UP (ref 0–0.5)
EOSINOPHIL NFR BLD AUTO: 0 % — SIGNIFICANT CHANGE UP (ref 0–6)
GIANT PLATELETS BLD QL SMEAR: PRESENT — SIGNIFICANT CHANGE UP
GLUCOSE BLDC GLUCOMTR-MCNC: 116 MG/DL — HIGH (ref 70–99)
GLUCOSE BLDC GLUCOMTR-MCNC: 126 MG/DL — HIGH (ref 70–99)
GLUCOSE BLDC GLUCOMTR-MCNC: 145 MG/DL — HIGH (ref 70–99)
GLUCOSE SERPL-MCNC: 107 MG/DL — HIGH (ref 70–99)
GLUCOSE UR QL: NEGATIVE MG/DL — SIGNIFICANT CHANGE UP
HCT VFR BLD CALC: 26.1 % — LOW (ref 34.5–45)
HGB BLD-MCNC: 8.5 G/DL — LOW (ref 11.5–15.5)
HYPOCHROMIA BLD QL: SLIGHT — SIGNIFICANT CHANGE UP
KETONES UR-MCNC: NEGATIVE MG/DL — SIGNIFICANT CHANGE UP
LEUKOCYTE ESTERASE UR-ACNC: ABNORMAL
LYMPHOCYTES # BLD AUTO: 0.25 K/UL — LOW (ref 1–3.3)
LYMPHOCYTES # BLD AUTO: 4.4 % — LOW (ref 13–44)
MACROCYTES BLD QL: SLIGHT — SIGNIFICANT CHANGE UP
MAGNESIUM SERPL-MCNC: 2 MG/DL — SIGNIFICANT CHANGE UP (ref 1.6–2.6)
MANUAL SMEAR VERIFICATION: SIGNIFICANT CHANGE UP
MCHC RBC-ENTMCNC: 28.1 PG — SIGNIFICANT CHANGE UP (ref 27–34)
MCHC RBC-ENTMCNC: 32.6 GM/DL — SIGNIFICANT CHANGE UP (ref 32–36)
MCV RBC AUTO: 86.4 FL — SIGNIFICANT CHANGE UP (ref 80–100)
MICROCYTES BLD QL: SLIGHT — SIGNIFICANT CHANGE UP
MONOCYTES # BLD AUTO: 0.21 K/UL — SIGNIFICANT CHANGE UP (ref 0–0.9)
MONOCYTES NFR BLD AUTO: 3.6 % — SIGNIFICANT CHANGE UP (ref 2–14)
NEUTROPHILS # BLD AUTO: 5.29 K/UL — SIGNIFICANT CHANGE UP (ref 1.8–7.4)
NEUTROPHILS NFR BLD AUTO: 92 % — HIGH (ref 43–77)
NITRITE UR-MCNC: NEGATIVE — SIGNIFICANT CHANGE UP
OSMOLALITY UR: 398 MOSM/KG — SIGNIFICANT CHANGE UP (ref 300–900)
OVALOCYTES BLD QL SMEAR: SLIGHT — SIGNIFICANT CHANGE UP
PH UR: 6 — SIGNIFICANT CHANGE UP (ref 5–8)
PHOSPHATE SERPL-MCNC: 4.8 MG/DL — HIGH (ref 2.5–4.5)
PLAT MORPH BLD: ABNORMAL
PLATELET # BLD AUTO: 273 K/UL — SIGNIFICANT CHANGE UP (ref 150–400)
POIKILOCYTOSIS BLD QL AUTO: SIGNIFICANT CHANGE UP
POLYCHROMASIA BLD QL SMEAR: SLIGHT — SIGNIFICANT CHANGE UP
POTASSIUM SERPL-MCNC: 3.9 MMOL/L — SIGNIFICANT CHANGE UP (ref 3.5–5.3)
POTASSIUM SERPL-SCNC: 3.9 MMOL/L — SIGNIFICANT CHANGE UP (ref 3.5–5.3)
PROT UR-MCNC: 300 MG/DL
RBC # BLD: 3.02 M/UL — LOW (ref 3.8–5.2)
RBC # FLD: 14.2 % — SIGNIFICANT CHANGE UP (ref 10.3–14.5)
RBC BLD AUTO: ABNORMAL
RBC CASTS # UR COMP ASSIST: 1 /HPF — SIGNIFICANT CHANGE UP (ref 0–4)
SCHISTOCYTES BLD QL AUTO: SLIGHT — SIGNIFICANT CHANGE UP
SODIUM SERPL-SCNC: 127 MMOL/L — LOW (ref 135–145)
SODIUM UR-SCNC: 41 MMOL/L — SIGNIFICANT CHANGE UP
SP GR SPEC: 1.02 — SIGNIFICANT CHANGE UP (ref 1–1.03)
SQUAMOUS # UR AUTO: 0 /HPF — SIGNIFICANT CHANGE UP (ref 0–5)
UROBILINOGEN FLD QL: 0.2 MG/DL — SIGNIFICANT CHANGE UP (ref 0.2–1)
UUN UR-MCNC: 655 MG/DL — SIGNIFICANT CHANGE UP
WBC # BLD: 5.75 K/UL — SIGNIFICANT CHANGE UP (ref 3.8–10.5)
WBC # FLD AUTO: 5.75 K/UL — SIGNIFICANT CHANGE UP (ref 3.8–10.5)
WBC UR QL: 0 /HPF — SIGNIFICANT CHANGE UP (ref 0–5)
YEAST-LIKE CELLS: PRESENT

## 2024-06-27 PROCEDURE — 99233 SBSQ HOSP IP/OBS HIGH 50: CPT

## 2024-06-27 PROCEDURE — 99233 SBSQ HOSP IP/OBS HIGH 50: CPT | Mod: GC

## 2024-06-27 RX ADMIN — IPRATROPIUM BROMIDE AND ALBUTEROL SULFATE 3 MILLILITER(S): .5; 3 SOLUTION RESPIRATORY (INHALATION) at 06:23

## 2024-06-27 RX ADMIN — PANTOPRAZOLE SODIUM 40 MILLIGRAM(S): 40 INJECTION, POWDER, FOR SOLUTION INTRAVENOUS at 06:23

## 2024-06-27 RX ADMIN — IPRATROPIUM BROMIDE AND ALBUTEROL SULFATE 3 MILLILITER(S): .5; 3 SOLUTION RESPIRATORY (INHALATION) at 22:52

## 2024-06-27 RX ADMIN — DEXAMETHASONE 6 MILLIGRAM(S): 1 TABLET ORAL at 06:23

## 2024-06-27 RX ADMIN — HEPARIN SODIUM 5000 UNIT(S): 50 INJECTION, SOLUTION INTRAVENOUS at 06:23

## 2024-06-27 RX ADMIN — Medication 100 MILLIGRAM(S): at 14:05

## 2024-06-27 RX ADMIN — ESCITALOPRAM OXALATE 10 MILLIGRAM(S): 20 TABLET, FILM COATED ORAL at 11:54

## 2024-06-27 RX ADMIN — IPRATROPIUM BROMIDE AND ALBUTEROL SULFATE 3 MILLILITER(S): .5; 3 SOLUTION RESPIRATORY (INHALATION) at 15:50

## 2024-06-27 RX ADMIN — AMLODIPINE BESYLATE 5 MILLIGRAM(S): 2.5 TABLET ORAL at 06:23

## 2024-06-27 RX ADMIN — Medication 3 MILLIGRAM(S): at 22:52

## 2024-06-27 RX ADMIN — IPRATROPIUM BROMIDE AND ALBUTEROL SULFATE 3 MILLILITER(S): .5; 3 SOLUTION RESPIRATORY (INHALATION) at 09:15

## 2024-06-27 RX ADMIN — PANTOPRAZOLE SODIUM 40 MILLIGRAM(S): 40 INJECTION, POWDER, FOR SOLUTION INTRAVENOUS at 17:57

## 2024-06-27 RX ADMIN — Medication 1 CAPSULE(S): at 09:15

## 2024-06-27 RX ADMIN — REMDESIVIR 200 MILLIGRAM(S): 5 INJECTION INTRAVENOUS at 17:57

## 2024-06-27 RX ADMIN — ATORVASTATIN CALCIUM 10 MILLIGRAM(S): 20 TABLET, FILM COATED ORAL at 22:52

## 2024-06-27 RX ADMIN — HEPARIN SODIUM 5000 UNIT(S): 50 INJECTION, SOLUTION INTRAVENOUS at 14:05

## 2024-06-28 VITALS
HEART RATE: 92 BPM | SYSTOLIC BLOOD PRESSURE: 147 MMHG | RESPIRATION RATE: 19 BRPM | TEMPERATURE: 98 F | OXYGEN SATURATION: 96 % | DIASTOLIC BLOOD PRESSURE: 65 MMHG

## 2024-06-28 LAB — GLUCOSE BLDC GLUCOMTR-MCNC: 113 MG/DL — HIGH (ref 70–99)

## 2024-06-28 PROCEDURE — 93306 TTE W/DOPPLER COMPLETE: CPT

## 2024-06-28 PROCEDURE — C8924: CPT

## 2024-06-28 PROCEDURE — 87040 BLOOD CULTURE FOR BACTERIA: CPT

## 2024-06-28 PROCEDURE — 83605 ASSAY OF LACTIC ACID: CPT

## 2024-06-28 PROCEDURE — 0225U NFCT DS DNA&RNA 21 SARSCOV2: CPT

## 2024-06-28 PROCEDURE — 84439 ASSAY OF FREE THYROXINE: CPT

## 2024-06-28 PROCEDURE — 96375 TX/PRO/DX INJ NEW DRUG ADDON: CPT

## 2024-06-28 PROCEDURE — 82803 BLOOD GASES ANY COMBINATION: CPT

## 2024-06-28 PROCEDURE — 84133 ASSAY OF URINE POTASSIUM: CPT

## 2024-06-28 PROCEDURE — 86901 BLOOD TYPING SEROLOGIC RH(D): CPT

## 2024-06-28 PROCEDURE — 82570 ASSAY OF URINE CREATININE: CPT

## 2024-06-28 PROCEDURE — 84156 ASSAY OF PROTEIN URINE: CPT

## 2024-06-28 PROCEDURE — 82248 BILIRUBIN DIRECT: CPT

## 2024-06-28 PROCEDURE — 86140 C-REACTIVE PROTEIN: CPT

## 2024-06-28 PROCEDURE — 71045 X-RAY EXAM CHEST 1 VIEW: CPT

## 2024-06-28 PROCEDURE — 85730 THROMBOPLASTIN TIME PARTIAL: CPT

## 2024-06-28 PROCEDURE — 86900 BLOOD TYPING SEROLOGIC ABO: CPT

## 2024-06-28 PROCEDURE — 84443 ASSAY THYROID STIM HORMONE: CPT

## 2024-06-28 PROCEDURE — 85379 FIBRIN DEGRADATION QUANT: CPT

## 2024-06-28 PROCEDURE — 36415 COLL VENOUS BLD VENIPUNCTURE: CPT

## 2024-06-28 PROCEDURE — 84100 ASSAY OF PHOSPHORUS: CPT

## 2024-06-28 PROCEDURE — 83529 ASAY OF INTERLEUKIN-6 (IL-6): CPT

## 2024-06-28 PROCEDURE — 80053 COMPREHEN METABOLIC PANEL: CPT

## 2024-06-28 PROCEDURE — 83930 ASSAY OF BLOOD OSMOLALITY: CPT

## 2024-06-28 PROCEDURE — 86850 RBC ANTIBODY SCREEN: CPT

## 2024-06-28 PROCEDURE — 84300 ASSAY OF URINE SODIUM: CPT

## 2024-06-28 PROCEDURE — 87899 AGENT NOS ASSAY W/OPTIC: CPT

## 2024-06-28 PROCEDURE — 99239 HOSP IP/OBS DSCHRG MGMT >30: CPT

## 2024-06-28 PROCEDURE — 82728 ASSAY OF FERRITIN: CPT

## 2024-06-28 PROCEDURE — 83935 ASSAY OF URINE OSMOLALITY: CPT

## 2024-06-28 PROCEDURE — 84145 PROCALCITONIN (PCT): CPT

## 2024-06-28 PROCEDURE — 83615 LACTATE (LD) (LDH) ENZYME: CPT

## 2024-06-28 PROCEDURE — 80048 BASIC METABOLIC PNL TOTAL CA: CPT

## 2024-06-28 PROCEDURE — 81001 URINALYSIS AUTO W/SCOPE: CPT

## 2024-06-28 PROCEDURE — 96374 THER/PROPH/DIAG INJ IV PUSH: CPT

## 2024-06-28 PROCEDURE — 82962 GLUCOSE BLOOD TEST: CPT

## 2024-06-28 PROCEDURE — 93005 ELECTROCARDIOGRAM TRACING: CPT

## 2024-06-28 PROCEDURE — 87449 NOS EACH ORGANISM AG IA: CPT

## 2024-06-28 PROCEDURE — 99285 EMERGENCY DEPT VISIT HI MDM: CPT | Mod: 25

## 2024-06-28 PROCEDURE — 94640 AIRWAY INHALATION TREATMENT: CPT

## 2024-06-28 PROCEDURE — 83036 HEMOGLOBIN GLYCOSYLATED A1C: CPT

## 2024-06-28 PROCEDURE — 85025 COMPLETE CBC W/AUTO DIFF WBC: CPT

## 2024-06-28 PROCEDURE — 84540 ASSAY OF URINE/UREA-N: CPT

## 2024-06-28 PROCEDURE — 97161 PT EVAL LOW COMPLEX 20 MIN: CPT

## 2024-06-28 PROCEDURE — 85610 PROTHROMBIN TIME: CPT

## 2024-06-28 PROCEDURE — 80061 LIPID PANEL: CPT

## 2024-06-28 PROCEDURE — 83880 ASSAY OF NATRIURETIC PEPTIDE: CPT

## 2024-06-28 PROCEDURE — 83735 ASSAY OF MAGNESIUM: CPT

## 2024-06-28 RX ORDER — DEXAMETHASONE 1 MG/1
1 TABLET ORAL
Qty: 0 | Refills: 0 | DISCHARGE
Start: 2024-06-28

## 2024-06-28 RX ORDER — ACETAMINOPHEN 325 MG
2 TABLET ORAL
Qty: 0 | Refills: 0 | DISCHARGE
Start: 2024-06-28

## 2024-06-28 RX ORDER — ZOLPIDEM TARTRATE 10 MG/1
1 TABLET ORAL
Qty: 0 | Refills: 0 | DISCHARGE

## 2024-06-28 RX ORDER — DEXAMETHASONE 1 MG/1
1 TABLET ORAL
Qty: 5 | Refills: 0
Start: 2024-06-28 | End: 2024-07-02

## 2024-06-28 RX ORDER — FUROSEMIDE 10 MG/ML
1 INJECTION, SOLUTION INTRAMUSCULAR; INTRAVENOUS
Qty: 0 | Refills: 0 | DISCHARGE

## 2024-06-28 RX ADMIN — Medication 1 CAPSULE(S): at 06:40

## 2024-06-28 RX ADMIN — DEXAMETHASONE 6 MILLIGRAM(S): 1 TABLET ORAL at 06:40

## 2024-06-28 RX ADMIN — AMLODIPINE BESYLATE 5 MILLIGRAM(S): 2.5 TABLET ORAL at 06:40

## 2024-06-28 RX ADMIN — IPRATROPIUM BROMIDE AND ALBUTEROL SULFATE 3 MILLILITER(S): .5; 3 SOLUTION RESPIRATORY (INHALATION) at 06:43

## 2024-06-28 RX ADMIN — PANTOPRAZOLE SODIUM 40 MILLIGRAM(S): 40 INJECTION, POWDER, FOR SOLUTION INTRAVENOUS at 06:40

## 2024-06-29 LAB
CULTURE RESULTS: SIGNIFICANT CHANGE UP
CULTURE RESULTS: SIGNIFICANT CHANGE UP
SPECIMEN SOURCE: SIGNIFICANT CHANGE UP
SPECIMEN SOURCE: SIGNIFICANT CHANGE UP

## 2024-07-02 PROBLEM — Z78.9 OTHER SPECIFIED HEALTH STATUS: Chronic | Status: ACTIVE | Noted: 2024-06-24

## 2024-07-02 PROBLEM — N18.4 CHRONIC KIDNEY DISEASE, STAGE 4 (SEVERE): Chronic | Status: ACTIVE | Noted: 2024-06-24

## 2024-07-02 PROBLEM — N32.81 OVERACTIVE BLADDER: Chronic | Status: ACTIVE | Noted: 2024-06-24

## 2024-07-02 PROBLEM — K22.4 DYSKINESIA OF ESOPHAGUS: Chronic | Status: ACTIVE | Noted: 2024-06-24

## 2024-07-02 PROBLEM — I50.33 ACUTE ON CHRONIC DIASTOLIC (CONGESTIVE) HEART FAILURE: Chronic | Status: ACTIVE | Noted: 2024-06-24

## 2024-07-02 PROBLEM — J44.9 CHRONIC OBSTRUCTIVE PULMONARY DISEASE, UNSPECIFIED: Chronic | Status: ACTIVE | Noted: 2024-06-24

## 2024-07-02 PROBLEM — Z71.89 OTHER SPECIFIED COUNSELING: Chronic | Status: ACTIVE | Noted: 2024-06-26

## 2024-07-02 PROBLEM — K86.89 OTHER SPECIFIED DISEASES OF PANCREAS: Chronic | Status: ACTIVE | Noted: 2024-06-24

## 2024-07-02 PROBLEM — N18.9 CHRONIC KIDNEY DISEASE, UNSPECIFIED: Chronic | Status: ACTIVE | Noted: 2024-06-24

## 2024-07-02 PROBLEM — Z01.89 ENCOUNTER FOR OTHER SPECIFIED SPECIAL EXAMINATIONS: Chronic | Status: ACTIVE | Noted: 2024-06-26

## 2024-07-05 ENCOUNTER — APPOINTMENT (OUTPATIENT)
Dept: NEPHROLOGY | Facility: CLINIC | Age: 89
End: 2024-07-05

## 2024-07-05 DIAGNOSIS — T50.2X5A ADVERSE EFFECT OF CARBONIC-ANHYDRASE INHIBITORS, BENZOTHIADIAZIDES AND OTHER DIURETICS, INITIAL ENCOUNTER: ICD-10-CM

## 2024-07-05 DIAGNOSIS — K22.89 OTHER SPECIFIED DISEASE OF ESOPHAGUS: ICD-10-CM

## 2024-07-05 DIAGNOSIS — R29.6 REPEATED FALLS: ICD-10-CM

## 2024-07-05 DIAGNOSIS — N18.4 CHRONIC KIDNEY DISEASE, STAGE 4 (SEVERE): ICD-10-CM

## 2024-07-05 DIAGNOSIS — R33.9 RETENTION OF URINE, UNSPECIFIED: ICD-10-CM

## 2024-07-05 DIAGNOSIS — K86.89 OTHER SPECIFIED DISEASES OF PANCREAS: ICD-10-CM

## 2024-07-05 DIAGNOSIS — J12.82 PNEUMONIA DUE TO CORONAVIRUS DISEASE 2019: ICD-10-CM

## 2024-07-05 DIAGNOSIS — J96.01 ACUTE RESPIRATORY FAILURE WITH HYPOXIA: ICD-10-CM

## 2024-07-05 DIAGNOSIS — E87.1 HYPO-OSMOLALITY AND HYPONATREMIA: ICD-10-CM

## 2024-07-05 DIAGNOSIS — I13.0 HYPERTENSIVE HEART AND CHRONIC KIDNEY DISEASE WITH HEART FAILURE AND STAGE 1 THROUGH STAGE 4 CHRONIC KIDNEY DISEASE, OR UNSPECIFIED CHRONIC KIDNEY DISEASE: ICD-10-CM

## 2024-07-05 DIAGNOSIS — Z41.8 ENCOUNTER FOR OTHER PROCEDURES FOR PURPOSES OTHER THAN REMEDYING HEALTH STATE: ICD-10-CM

## 2024-07-05 DIAGNOSIS — R13.10 DYSPHAGIA, UNSPECIFIED: ICD-10-CM

## 2024-07-05 DIAGNOSIS — N17.0 ACUTE KIDNEY FAILURE WITH TUBULAR NECROSIS: ICD-10-CM

## 2024-07-05 DIAGNOSIS — J69.0 PNEUMONITIS DUE TO INHALATION OF FOOD AND VOMIT: ICD-10-CM

## 2024-07-05 DIAGNOSIS — K21.9 GASTRO-ESOPHAGEAL REFLUX DISEASE WITHOUT ESOPHAGITIS: ICD-10-CM

## 2024-07-05 DIAGNOSIS — Z66 DO NOT RESUSCITATE: ICD-10-CM

## 2024-07-05 DIAGNOSIS — Z87.891 PERSONAL HISTORY OF NICOTINE DEPENDENCE: ICD-10-CM

## 2024-07-05 DIAGNOSIS — U07.1 COVID-19: ICD-10-CM

## 2024-07-05 DIAGNOSIS — J15.69 PNEUMONIA DUE TO OTHER GRAM-NEGATIVE BACTERIA: ICD-10-CM

## 2024-07-05 DIAGNOSIS — I50.33 ACUTE ON CHRONIC DIASTOLIC (CONGESTIVE) HEART FAILURE: ICD-10-CM

## 2024-07-05 DIAGNOSIS — D64.9 ANEMIA, UNSPECIFIED: ICD-10-CM

## 2024-07-05 DIAGNOSIS — E87.20 ACIDOSIS, UNSPECIFIED: ICD-10-CM

## 2024-07-05 DIAGNOSIS — J44.0 CHRONIC OBSTRUCTIVE PULMONARY DISEASE WITH (ACUTE) LOWER RESPIRATORY INFECTION: ICD-10-CM

## 2024-07-05 DIAGNOSIS — E78.5 HYPERLIPIDEMIA, UNSPECIFIED: ICD-10-CM

## 2024-07-05 DIAGNOSIS — Z85.820 PERSONAL HISTORY OF MALIGNANT MELANOMA OF SKIN: ICD-10-CM

## 2024-09-06 ENCOUNTER — APPOINTMENT (OUTPATIENT)
Dept: OTOLARYNGOLOGY | Facility: CLINIC | Age: 89
End: 2024-09-06
Payer: MEDICARE

## 2024-09-06 DIAGNOSIS — H61.23 IMPACTED CERUMEN, BILATERAL: ICD-10-CM

## 2024-09-06 DIAGNOSIS — H90.3 SENSORINEURAL HEARING LOSS, BILATERAL: ICD-10-CM

## 2024-09-06 PROCEDURE — 69210 REMOVE IMPACTED EAR WAX UNI: CPT

## 2024-09-06 PROCEDURE — 99203 OFFICE O/P NEW LOW 30 MIN: CPT | Mod: 25

## 2024-09-06 NOTE — ASSESSMENT
[FreeTextEntry1] : - 9/6/24 On exam there was evidence of cerumen impaction. This was cleared today without issue. I am recommending Debrox ear drops x 3 days. Encouraged annual audiogram/tympanogram given known hearing loss. Follow up with me as needed for ear cleanings.  - Debrox x 3 days, handout given - Encouraged annual audiogram/tympanogram given known hearing loss - Follow up with me as needed for ear cleanings

## 2024-09-06 NOTE — PHYSICAL EXAM
[Midline] : trachea located in midline position [Normal] : temporomandibular joint is normal [de-identified] : bilateral cerumen impaction - cleared. eacs normal

## 2024-09-06 NOTE — PROCEDURE
[FreeTextEntry3] : - Cerumen Removal/Ear Cleaning for Otitis Externa Pre-operative Diagnosis: bilateral Cerumen Impaction Post-operative Diagnosis: Same Procedure:  Binocular microscopy with cerumen removal- [id number] Procedure Details:   The patient was placed in the supine position.  The operating microscope was positioned.  I then placed the ear speculum in the EAC.  Cerumen was then removed using a mixture of otologic curettes, and suction.  The TM was noted to be intact. I then performed the procedure of the opposite ear in similar fashion.  The patient tolerated procedure well.  Findings:  Bilateral Ear Canal - normal Bilateral Tympanic Membrane - normal  Recommendations: Debrox Complications: None

## 2024-09-06 NOTE — HISTORY OF PRESENT ILLNESS
[de-identified] : - 9/6/24:  89-year-old woman with known hearing loss and hearing aids sees audiologist presenting for initial consult for cerumen impaction. Denies aural fullness, otalgia, otorrhea, vertigo. Does not use Qtips. No other ENT issues.

## (undated) DEVICE — FOLEY TRAY 16FR LF URINE METER SURESTEP

## (undated) DEVICE — ENDOCATCH GENERAL 10MM (PURPLE)

## (undated) DEVICE — INFLATION DEVICE BIG 60

## (undated) DEVICE — DRAIN PENROSE 5/8" X 18" SILICONE

## (undated) DEVICE — DVC INFLATION CRE STERIFLATE

## (undated) DEVICE — KIT ENDO PROCEDURE CUST W/VLV

## (undated) DEVICE — GLV 8 PROTEXIS (WHITE)

## (undated) DEVICE — WARMING BLANKET LOWER ADULT

## (undated) DEVICE — SYR LUER SLIP TIP 30CC

## (undated) DEVICE — DRAPE TOWEL BLUE 17" X 24"

## (undated) DEVICE — TUBING CAP SET AIR AND WATER FOR OLYMPUS SCOPE 0.4M

## (undated) DEVICE — TUBING SUCTION 20FT

## (undated) DEVICE — DRSG MASTISOL

## (undated) DEVICE — TUBING STRYKER PNEUMOCLEAR SMOKE HEAT HUMID

## (undated) DEVICE — ELCTR BOVIE PENCIL HANDPIECE ROCKER SWITCH 15FT

## (undated) DEVICE — DRSG STERISTRIPS 0.5 X 4"

## (undated) DEVICE — GOWN SURG XXLG 4 LVL RAGLAN BREATHABLE 14/CA

## (undated) DEVICE — VENODYNE/SCD SLEEVE CALF MEDIUM

## (undated) DEVICE — SUT SILK 2-0 30" SH